# Patient Record
Sex: MALE | Race: WHITE | NOT HISPANIC OR LATINO | Employment: OTHER | URBAN - NONMETROPOLITAN AREA
[De-identification: names, ages, dates, MRNs, and addresses within clinical notes are randomized per-mention and may not be internally consistent; named-entity substitution may affect disease eponyms.]

---

## 2017-04-18 ENCOUNTER — TRANSCRIBE ORDERS (OUTPATIENT)
Dept: ADMINISTRATIVE | Facility: HOSPITAL | Age: 57
End: 2017-04-18

## 2017-04-18 DIAGNOSIS — R01.1 MURMUR, HEART: Primary | ICD-10-CM

## 2017-04-18 DIAGNOSIS — M79.604 LEG PAIN, RIGHT: ICD-10-CM

## 2017-04-18 DIAGNOSIS — M79.605 LEG PAIN, LEFT: ICD-10-CM

## 2017-04-18 DIAGNOSIS — R60.9 EDEMA, UNSPECIFIED TYPE: ICD-10-CM

## 2017-04-19 ENCOUNTER — APPOINTMENT (OUTPATIENT)
Dept: ULTRASOUND IMAGING | Facility: HOSPITAL | Age: 57
End: 2017-04-19

## 2017-04-19 ENCOUNTER — APPOINTMENT (OUTPATIENT)
Dept: CARDIOLOGY | Facility: HOSPITAL | Age: 57
End: 2017-04-19

## 2017-04-24 ENCOUNTER — HOSPITAL ENCOUNTER (OUTPATIENT)
Dept: GENERAL RADIOLOGY | Facility: HOSPITAL | Age: 57
Discharge: HOME | End: 2017-04-24

## 2017-04-24 ENCOUNTER — HOSPITAL ENCOUNTER (OUTPATIENT)
Dept: ULTRASOUND IMAGING | Facility: HOSPITAL | Age: 57
Discharge: HOME | End: 2017-04-24

## 2017-04-24 ENCOUNTER — TRANSCRIBE ORDERS (OUTPATIENT)
Dept: ADMINISTRATIVE | Facility: HOSPITAL | Age: 57
End: 2017-04-24

## 2017-04-24 ENCOUNTER — HOSPITAL ENCOUNTER (OUTPATIENT)
Dept: CARDIOLOGY | Facility: HOSPITAL | Age: 57
Discharge: HOME | End: 2017-04-24
Admitting: NURSE PRACTITIONER

## 2017-04-24 VITALS — HEIGHT: 65 IN | BODY MASS INDEX: 30.82 KG/M2 | WEIGHT: 185 LBS

## 2017-04-24 DIAGNOSIS — R60.9 EDEMA, UNSPECIFIED TYPE: ICD-10-CM

## 2017-04-24 DIAGNOSIS — R01.1 CARDIAC MURMUR: ICD-10-CM

## 2017-04-24 DIAGNOSIS — M79.605 LEG PAIN, LEFT: ICD-10-CM

## 2017-04-24 DIAGNOSIS — R60.9 EDEMA, UNSPECIFIED TYPE: Primary | ICD-10-CM

## 2017-04-24 DIAGNOSIS — R01.1 MURMUR, HEART: ICD-10-CM

## 2017-04-24 DIAGNOSIS — M79.604 LEG PAIN, RIGHT: ICD-10-CM

## 2017-04-24 LAB
BH CV ECHO MEAS - AO MAX PG (FULL): 6.3 MMHG
BH CV ECHO MEAS - AO MAX PG: 11.3 MMHG
BH CV ECHO MEAS - AO MEAN PG (FULL): 5 MMHG
BH CV ECHO MEAS - AO MEAN PG: 8 MMHG
BH CV ECHO MEAS - AO ROOT AREA (BSA CORRECTED): 1.7
BH CV ECHO MEAS - AO ROOT AREA: 8 CM^2
BH CV ECHO MEAS - AO ROOT DIAM: 3.2 CM
BH CV ECHO MEAS - AO V2 MAX: 168 CM/SEC
BH CV ECHO MEAS - AO V2 MEAN: 130 CM/SEC
BH CV ECHO MEAS - AO V2 VTI: 43.3 CM
BH CV ECHO MEAS - AVA(I,A): 2.6 CM^2
BH CV ECHO MEAS - AVA(I,D): 2.6 CM^2
BH CV ECHO MEAS - AVA(V,A): 2.8 CM^2
BH CV ECHO MEAS - AVA(V,D): 2.8 CM^2
BH CV ECHO MEAS - BSA(HAYCOCK): 2 M^2
BH CV ECHO MEAS - BSA: 1.9 M^2
BH CV ECHO MEAS - BZI_BMI: 30.8 KILOGRAMS/M^2
BH CV ECHO MEAS - BZI_METRIC_HEIGHT: 165.1 CM
BH CV ECHO MEAS - BZI_METRIC_WEIGHT: 83.9 KG
BH CV ECHO MEAS - CONTRAST EF 4CH: 54.1 ML/M^2
BH CV ECHO MEAS - EDV(CUBED): 59.3 ML
BH CV ECHO MEAS - EDV(MOD-SP4): 67.9 ML
BH CV ECHO MEAS - EDV(TEICH): 65.9 ML
BH CV ECHO MEAS - EF(CUBED): 59.7 %
BH CV ECHO MEAS - EF(TEICH): 52 %
BH CV ECHO MEAS - ESV(CUBED): 23.9 ML
BH CV ECHO MEAS - ESV(MOD-SP4): 31.2 ML
BH CV ECHO MEAS - ESV(TEICH): 31.7 ML
BH CV ECHO MEAS - FS: 26.2 %
BH CV ECHO MEAS - IVS/LVPW: 1.1
BH CV ECHO MEAS - IVSD: 1.3 CM
BH CV ECHO MEAS - LA DIMENSION: 4.3 CM
BH CV ECHO MEAS - LA/AO: 1.3
BH CV ECHO MEAS - LV DIASTOLIC VOL/BSA (35-75): 35.5 ML/M^2
BH CV ECHO MEAS - LV MASS(C)D: 174.5 GRAMS
BH CV ECHO MEAS - LV MASS(C)DI: 91.2 GRAMS/M^2
BH CV ECHO MEAS - LV MAX PG: 5 MMHG
BH CV ECHO MEAS - LV MEAN PG: 3 MMHG
BH CV ECHO MEAS - LV SYSTOLIC VOL/BSA (12-30): 16.3 ML/M^2
BH CV ECHO MEAS - LV V1 MAX: 112 CM/SEC
BH CV ECHO MEAS - LV V1 MEAN: 79.4 CM/SEC
BH CV ECHO MEAS - LV V1 VTI: 27 CM
BH CV ECHO MEAS - LVIDD: 3.9 CM
BH CV ECHO MEAS - LVIDS: 2.9 CM
BH CV ECHO MEAS - LVLD AP4: 7.8 CM
BH CV ECHO MEAS - LVLS AP4: 5.9 CM
BH CV ECHO MEAS - LVOT AREA (M): 4.2 CM^2
BH CV ECHO MEAS - LVOT AREA: 4.2 CM^2
BH CV ECHO MEAS - LVOT DIAM: 2.3 CM
BH CV ECHO MEAS - LVPWD: 1.2 CM
BH CV ECHO MEAS - MV A MAX VEL: 89.2 CM/SEC
BH CV ECHO MEAS - MV DEC TIME: 0.3 SEC
BH CV ECHO MEAS - MV E MAX VEL: 92.2 CM/SEC
BH CV ECHO MEAS - MV E/A: 1
BH CV ECHO MEAS - RAP SYSTOLE: 10 MMHG
BH CV ECHO MEAS - RVSP: 36 MMHG
BH CV ECHO MEAS - SI(AO): 182 ML/M^2
BH CV ECHO MEAS - SI(CUBED): 18.5 ML/M^2
BH CV ECHO MEAS - SI(LVOT): 58.6 ML/M^2
BH CV ECHO MEAS - SI(MOD-SP4): 19.2 ML/M^2
BH CV ECHO MEAS - SI(TEICH): 17.9 ML/M^2
BH CV ECHO MEAS - SV(AO): 348.2 ML
BH CV ECHO MEAS - SV(CUBED): 35.4 ML
BH CV ECHO MEAS - SV(LVOT): 112.2 ML
BH CV ECHO MEAS - SV(MOD-SP4): 36.7 ML
BH CV ECHO MEAS - SV(TEICH): 34.2 ML
BH CV ECHO MEAS - TR MAX VEL: 255 CM/SEC
E/E' RATIO: 13.9
LEFT ATRIUM VOLUME INDEX: 19.7 ML/M2
LEFT ATRIUM VOLUME: 37.7 CM3

## 2017-04-24 PROCEDURE — 93306 TTE W/DOPPLER COMPLETE: CPT | Performed by: INTERNAL MEDICINE

## 2017-04-24 PROCEDURE — 93306 TTE W/DOPPLER COMPLETE: CPT

## 2017-04-24 PROCEDURE — 71020 HC CHEST PA AND LATERAL: CPT

## 2017-04-24 PROCEDURE — 93970 EXTREMITY STUDY: CPT | Performed by: SURGERY

## 2017-04-24 PROCEDURE — 93970 EXTREMITY STUDY: CPT

## 2017-06-12 ENCOUNTER — TRANSCRIBE ORDERS (OUTPATIENT)
Dept: ADMINISTRATIVE | Facility: HOSPITAL | Age: 57
End: 2017-06-12

## 2017-06-12 ENCOUNTER — HOSPITAL ENCOUNTER (OUTPATIENT)
Dept: GENERAL RADIOLOGY | Facility: HOSPITAL | Age: 57
Discharge: HOME | End: 2017-06-12
Attending: INTERNAL MEDICINE | Admitting: INTERNAL MEDICINE

## 2017-06-12 DIAGNOSIS — M25.511 RIGHT SHOULDER PAIN, UNSPECIFIED CHRONICITY: Primary | ICD-10-CM

## 2017-06-12 PROCEDURE — 73030 X-RAY EXAM OF SHOULDER: CPT

## 2017-06-20 ENCOUNTER — TRANSCRIBE ORDERS (OUTPATIENT)
Dept: ADMINISTRATIVE | Facility: HOSPITAL | Age: 57
End: 2017-06-20
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
## 2017-06-20 DIAGNOSIS — M25.511 RIGHT SHOULDER PAIN, UNSPECIFIED CHRONICITY: ICD-10-CM

## 2017-06-23 ENCOUNTER — HOSPITAL ENCOUNTER (OUTPATIENT)
Dept: MRI IMAGING | Facility: HOSPITAL | Age: 57
Discharge: HOME | End: 2017-06-23
Admitting: NURSE PRACTITIONER

## 2017-06-23 DIAGNOSIS — M25.511 RIGHT SHOULDER PAIN, UNSPECIFIED CHRONICITY: ICD-10-CM
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
## 2017-06-23 PROCEDURE — 73221 MRI JOINT UPR EXTREM W/O DYE: CPT

## 2017-07-24 ENCOUNTER — TRANSCRIBE ORDERS (OUTPATIENT)
Dept: ADMINISTRATIVE | Facility: HOSPITAL | Age: 57
End: 2017-07-24

## 2017-07-24 DIAGNOSIS — R53.83 OTHER FATIGUE: ICD-10-CM

## 2017-07-24 DIAGNOSIS — R11.0 NAUSEA: Primary | ICD-10-CM

## 2017-07-26 ENCOUNTER — TRANSCRIBE ORDERS (OUTPATIENT)
Dept: PHYSICAL THERAPY | Facility: HOSPITAL | Age: 57
End: 2017-07-26

## 2017-07-26 DIAGNOSIS — M25.511 RIGHT SHOULDER PAIN, UNSPECIFIED CHRONICITY: Primary | ICD-10-CM

## 2017-08-01 ENCOUNTER — HOSPITAL ENCOUNTER (OUTPATIENT)
Dept: PHYSICAL THERAPY | Facility: HOSPITAL | Age: 57
Setting detail: THERAPIES SERIES
Discharge: HOME | End: 2017-08-01

## 2017-08-01 DIAGNOSIS — M25.511 RIGHT SHOULDER PAIN, UNSPECIFIED CHRONICITY: ICD-10-CM

## 2017-08-01 DIAGNOSIS — M75.121 COMPLETE TEAR OF RIGHT ROTATOR CUFF: Primary | ICD-10-CM

## 2017-08-01 PROCEDURE — 97110 THERAPEUTIC EXERCISES: CPT

## 2017-08-01 PROCEDURE — G8985 CARRY GOAL STATUS: HCPCS

## 2017-08-01 PROCEDURE — G8984 CARRY CURRENT STATUS: HCPCS

## 2017-08-01 PROCEDURE — 97161 PT EVAL LOW COMPLEX 20 MIN: CPT

## 2017-08-02 NOTE — THERAPY EVALUATION
"    Outpatient Physical Therapy Ortho Initial Evaluation  [facility]     Patient Name: Vitaly Pantoja  : 1960  MRN: [id number]  Today's Date: 2017      Visit Date: 2017    There is no problem list on file for this patient.       Past Medical History:   Diagnosis Date   • Back pain     current back pain per pt   • Chronic rheumatic arthritis    • Diabetes mellitus    • Hip dysplasia, congenital         Past Surgical History:   Procedure Laterality Date   • APPENDECTOMY     • JOINT REPLACEMENT Bilateral     TKA   • TONSILLECTOMY         Visit Dx:     ICD-10-CM ICD-9-CM   1. Complete tear of right rotator cuff M75.121 727.61   2. Right shoulder pain, unspecified chronicity M25.511 719.41             Patient History       17 1322          History    Chief Complaint Pain  -TC      Type of Pain Shoulder pain  -TC      Date Current Problem(s) Began 17  -TC      Brief Description of Current Complaint Pt reported that he fell onto his right shoulder  the . He was walking down the stairs, his right foot slipped and he went down onto his right shoulder which was by his side. Last Tuesday he fell again on his shoulder while inside onto carpet but had no increase in pain. He denies any numbness or tingling but if he moves \"wrong\" there will be shooting pain down the right arm into his thumb.     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 medication  -TC      What clinical tests have you had for this problem? X-ray;MRI  -TC      Results of Clinical Tests Xray R shoulder negative acute fracture; MRI; Full-width full-thickness retracted tears of the supraspinatus and infraspinatous tendons with muscle edema signal, AC joint arthropathy with lateral downsloping acromial process  -TC      Pain     Pain Location Shoulder  -TC      Pain " at Present 3  -TC      Pain at Best 0  -TC      Pain at Worst 10  -TC      Pain Frequency Intermittent  -TC      Pain Description Aching;Discomfort;Sharp;Shooting  -TC      What Performance Factors Make the Current Problem(s) WORSE? anything overhead using his arm, lifting anything heavy especially things like laundry or playing his guitars    -TC      What Performance Factors Make the Current Problem(s) BETTER? resting, medication for pain  -TC      Is your sleep disturbed? --   sometimes if rolls onto right side   -TC      Difficulties with ADL's? cleaning, cooking, laundry, lifting. donning/doffing shirt, lifting, carrying, yard work, difficulty caring for his grandmother  -TC      Fall Risk Assessment    Any falls in the past year: Yes  -TC      Number of falls reported in the last 12 months 2;   -TC      Factors that contributed to the fall: Slippery surface;Lost balance  -TC      Services    Prior Rehab/Home Health Experiences Yes  -TC      When was the prior experience with Rehab/Home Health long time ago as a child for hip dysplagia  -TC      Daily Activities    Primary Language English  -TC      How does patient learn best? Listening;Reading;Demonstration;Pictures/Video  -TC      Teaching needs identified Home Exercise Program;Management of Condition;Falls Prevention  -TC      Patient is concerned about/has problems with Difficulty with self care (i.e. bathing, dressing, toileting:;Performing home management (household chores, shopping, care of dependents);Repetitive movements of the hand, arm, shoulder  -TC      Does patient have problems with the following? Depression;Anxiety;Panic Attack  -TC      Barriers to learning --   pt has some slight comprehensive difficulties   -TC      Action taken for identified issues Heavily educated using multiple learning models such as vision, reading, demo, pictures and description  -TC      Pt Participated in POC and Goals Yes  -TC        User Key  (r) = Recorded By,  (t) = Taken By, (c) = Cosigned By    Initials Name Provider Type    TC Rivka Lewis, PT DPT Physical Therapist                PT Ortho       08/01/17 1300    Posture/Observations    Alignment Options Forward head;Cervical lordosis;Thoracic kyphosis;Rounded shoulders  -TC    Forward Head Moderate  -TC    Cervical Lordosis Moderate  -TC    Thoracic Kyphosis Moderate  -TC    Rounded Shoulders Moderate  -TC    Posture/Observations Comments Examining his shoulder, therapist noted hollowing/diveting at the SS insertion and IS insertion  -TC    Quarter Clearing    Quarter Clearing Upper Quarter Clearing  -TC    DTR- Upper Quarter Clearing    Biceps (C5/6) Bilateral:;2- Normal response  -TC    Brachioradialis (C6) Bilateral:;2- Normal response  -TC    Triceps (C7) Bilateral:;2- Normal response  -TC    Sensory Screen for Light Touch- Upper Quarter Clearing    C4 (posterior shoulder) Bilateral:;Intact  -TC    C5 (lateral upper arm) Bilateral:;Intact  -TC    C6 (tip of thumb) Right:;Diminished;Left:;Intact  -TC    C7 (tip of 3rd finger) Bilateral:;Intact  -TC    C8 (tip of 5th finger) Bilateral:;Intact  -TC    T1 (medial lower arm) Bilateral:;Intact  -TC    Myotomal Screen- Upper Quarter Clearing    Shoulder flexion (C5) Right:;3- (Fair -);3 (Fair);Left:;4+ (Good +)   partially limited by pain on the right   -TC    Elbow flexion/wrist extension (C6) Bilateral:;4+ (Good +)  -TC    Elbow extension/wrist flexion (C7) Bilateral:;4- (Good -)  -TC    Finger flexion/ (C8) Right:;4 (Good);Left:;4+ (Good +)  -TC    Cervical/Shoulder ROM Screen    Cervical flexion Normal  -TC    Cervical extension Normal  -TC    Cervical lateral flexion Normal  -TC    Cervical rotation Normal  -TC    Cervical quadrant (Spurling's) Normal  -TC    Special Tests/Palpation    Special Tests/Palpation Cervical/Thoracic;Shoulder  -TC    Cervical Palpation    Cervical Palpation- Location? AC joint;Scalenes;Levator scapula;Upper traps;Middle  traps;Lower traps  -TC    AC Joint Right:;Tender  -TC    Scalenes Bilateral:;Guarded/taut  -TC    Levator Scapula Right:;Tender;Guarded/taut  -TC    Upper Traps Right:;Tender;Guarded/taut  -TC    Middle Traps Bilateral:;Atrophied  -TC    Lower Traps Bilateral:;Atrophied  -TC    Cervical Accessory Motions    Cervical Accessory Motions Tested? Yes  -TC    Thoracic Accessory Motions    Thoracic Accessory Motions Tested? Yes  -TC    Pa glide- Upper thoracic Hypomobile  -TC    Pa glide- Middle thoracic Hypomobile  -TC    Pa glide- Lower thoracic Hypomobile  -TC    Cervical/Thoracic Special Tests    Spurlings (Foraminal Compression) Negative  -TC    Cervical Compression (Forarminal Compression vs. Facet Pain) Negative  -TC    Cervical Distraction (Foraminal Compression vs. Facet Pain) Negative  -TC    Bakody/Shoulder Abduction Sign (Cervical Radiculopathy) Negative  -TC    Shoulder Girdle Palpation    Subacromial Space Right:;Tender   diveting noted  -TC    Supraspinatus Insertion Right:;Tender   diveting noted  -TC    AC Joint Right:;Tender;Warm  -TC    Deltoid Right:;Guarded/taut;Atrophied  -TC    Infraspinatus Right:;Tender   diveting noted over insertion, retracted IS mm palpated   -TC    Shoulder Girdle Palpation? Yes  -TC    Shoulder Girdle Accessory Motions    Shoulder Girdle Accessory Motions Tested? Yes  -TC    Posterior glide of humerus Right:;Hypomobile  -TC    Inferior glide of humerus Right:;Hypomobile   only slightly hypomobile  -TC    A-P glide of AC joint Right:;Hypomobile   painful   -TC    Cephalocaudal glide of AC joint Right:;Hypomobile   painful   -TC    Shoulder Impingement/Rotator Cuff Special Tests    Drop Arm Test (Full Thickness RC Lesion) Right:;Positive  -TC    Lift-Off Test (Subscapularis Lesion) Right:;Positive  -TC    Shoulder Laxity/Instability Special Tests    Load and Shift Test Right:;Positive   anteriorly   -TC    Sulcus Sign, 0 Degrees Right:;Positive  -TC    Horizontal Adduction Test  (AC Joint Pain) Right:;Positive  -TC    Scapular Special Tests    Scapular Assistance Test (Scapular Dyskinesia) Right:;Positive  -TC    Scapular Retraction Test (Scapular Dyskinesia) Right:;Positive  -TC    ROM (Range of Motion)    General ROM upper extremity range of motion deficits identified  -TC    Right Shoulder    Flexion AROM Deficit 88 degrees w/o significant compensations; otherwise able to achieve 160 deg   w/o significant compensations  -TC    Flexion PROM Deficit 165 deg  -TC    ABduction AROM Deficit 45 degrees w/o significant compensations otherwise able to get to 160  -TC    ABduction PROM Deficit 160 deg   -TC    External Rotation AROM Deficit by side; 23 deg   -TC    External Rotation PROM Deficit by side 33 deg   -TC    Left Scapula    ABduction AROM hypermobility present  -TC    ABduction PROM hypermobility present  -TC    ADduction AROM restricted  -TC    ADduction PROM restricted  -TC    Right Scapula    ABduction AROM hypermobility present  -TC    ABduction PROM hypermobility present  -TC    ADduction AROM restricted  -TC    ADduction PROM restricted  -TC    General UE Assessment    ROM LUE ROM was WNL;shoulder, right: UE ROM deficit;scapula, right: UE ROM deficit;scapula, left: UE ROM deficit  -    MMT (Manual Muscle Testing)    General MMT Assessment upper extremity strength deficits identified  -TC    General MMT Assessment Detail  on R 35lbs,  on L 40lbs   -    Upper Extremity    Upper Ext Manual Muscle Testing right shoulder strength deficit;left scapular strength deficit;right scapular strength deficit  -    Flexibility    Flexibility Tested? Upper Extremity  -TC    Upper Extremity Flexibility    Upper Trapezius Right:;Mildly limited  -TC    Levator Scapula Right:;Mildly limited  -TC    Pect Minor Bilateral:;Moderately limited  -TC    Pect Major Moderately limited;Bilateral:  -TC    Pathomechanics    Upper Extremity Pathomechanics Excessive scapular elevation;Winging of  scapula with elevation;Winging of scapula with return to neutral;Limited thoracic extension  -TC    Pathomechanics Comments When attempting R shoulder abduction he demonstrates scapular elevation, superior GHJ glide and excessive GHJ IR below 90 and uses momentum strategy to raise shoulder the remaining range overhead. When he attempts shoulder flexion he demonstrates initiation of motion by increased scapular protraction and anterior placement ofthe GHJ along with excessive scapular elevation until about 90 degrees. To perform motion overhead he then demonstrates same momentum strategy and leans backwards to raise overhead.   -TC      User Key  (r) = Recorded By, (t) = Taken By, (c) = Cosigned By    Initials Name Provider Type    TC Rivka Lewis, PT DPT Physical Therapist                            Therapy Education       08/01/17 1322          Therapy Education    Given HEP;Symptoms/condition management;Pain management;Posture/body mechanics  -TC      Program New   R shoulder passive ER/IR, flexion and scapular Ws  -TC      How Provided Verbal;Demonstration;Written  -TC      Provided to Patient  -TC      Level of Understanding Teach back education performed;Verbalized;Demonstrated  -TC        User Key  (r) = Recorded By, (t) = Taken By, (c) = Cosigned By    Initials Name Provider Type    TC Rivka Lewis, PT DPT Physical Therapist                PT OP Goals       08/01/17 1322       PT Short Term Goals    STG Date to Achieve 08/29/17  -TC     STG 1 Pt will report 50% decrease in R shoulder pain with ROM activity.   -TC     STG 1 Progress New  -TC     STG 2 Pt will demonstrate 25% improvement in upright posture especially regarding thoracic extension.   -TC     STG 2 Progress New  -     Long Term Goals    LTG Date to Achieve 09/26/17  -TC     LTG 1 Pt will demonstrate improved thoracic extension by 50%.   -TC     LTG 1 Progress New  -TC     LTG 2 Pt will demonstrate full PROM of the right shoulder  painfree before discharge.   -TC     LTG 2 Progress New  -TC     LTG 3 Pt will demonstrate AROM equal to that of the uninvolved side with pain no greater than 2/10.   -TC     LTG 3 Progress New  -TC     LTG 4 Pt will demonstrate improved  strength to at least 55lb on the right before d/c.   -TC     LTG 4 Progress New  -TC     LTG 5 Pt will show improved gross shoulder strength to 4/5 before d/c.   -TC     LTG 5 Progress New  -TC     LTG 6 Pt will be able to lift/carry >40lbs with pain <3/10 in order to demonstrate gains in functional ability.   -TC     LTG 6 Progress New  -TC     LTG 7 Pt will demonstrate improved scapular strength to 4-/5 in order to improve functional ability of the shoulder complex.   -TC     LTG 7 Progress New  -TC     Time Calculation    PT Goal Re-Cert Due Date 09/01/17  -TC       User Key  (r) = Recorded By, (t) = Taken By, (c) = Cosigned By    Initials Name Provider Type    TC Rivka Lewis, PT DPT Physical Therapist                PT Assessment/Plan       08/01/17 1322       PT Assessment    Functional Limitations Limitations in functional capacity and performance;Performance in self-care ADL;Limitations in community activities;Limitation in home management  -TC     Impairments Endurance;Impaired muscle power;Joint mobility;Muscle strength;Pain;Posture  -TC     Assessment Comments Patient presents to us with 2 month history of right shoulder pain s/p a fall from stairs onto his shoulder. He had an Xray and MRI at the time and although an acute fracture was ruled out, they discovered full thickness tears of the infraspinatous and supraspinatous muscles of the rotator cuff. This patient wishes to participate in physical therapy to regain strength and function of the right shoulder so that he may avoid surgery at this time. Upon assessment he presents with slight right GHJ hypomobility, scapular dyskinesia, gross shoulder and scapular weakness and proprioception and also signs of an  AC sprain. These impairments limit his PROM and AROM also leading to poor pathomechanics and compensatory strategies during movement exacerbating his hypomobility and pain. Despite his full thickness tears we will work to normalize adequate force coupling relationships of the shoulder and scapula beginning with ROM and proprioception activities and progressing towards functional strengthening so that we may restore his function avoiding surgical intervention. Thank you for this referral, we look forward to working with him.   -TC     Please refer to paper survey for additional self-reported information Yes  -TC     Rehab Potential Good  -TC     Patient/caregiver participated in establishment of treatment plan and goals Yes  -TC     Patient would benefit from skilled therapy intervention Yes  -TC     PT Plan    PT Frequency 2x/week  -TC     Predicted Duration of Therapy Intervention (days/wks) 8 weeks   -TC     Planned CPT's? PT EVAL LOW COMPLEXITY: 14583;PT THER PROC EA 15 MIN: 79064;PT MANUAL THERAPY EA 15 MIN: 11124;PT NEUROMUSC RE-EDUCATION EA 15 MIN: 03845;PT ELECTRICAL STIM UNATTEND: ;PT ELECTRICAL STIM ATTD EA 15 MIN: 40885  -TC     Physical Therapy Interventions (Optional Details) home exercise program;joint mobilization;manual therapy techniques;modalities;gross motor skills;gait training;patient/family education;postural re-education;ROM (Range of Motion);strengthening;taping  -TC     PT Plan Comments Restoring his shoulder, scapular, and thoracic mobility and ROM will be our focus initially. We will then progress him towards more static and dynamic proprioception training to begin to restore proper force coupling with our ending focus more on functional strengthening in order to progress him towards his overall functional goal of caring for his 97 year old grand mother.  -TC       User Key  (r) = Recorded By, (t) = Taken By, (c) = Cosigned By    Initials Name Provider Type    TC Rivka Lewis, PT  DPT Physical Therapist                  Exercises       08/01/17 1322          Exercise 1    Exercise Name 1 passive R shoulder ER  -TC      Sets 1 2  -TC      Reps 1 15  -TC      Exercise 2    Exercise Name 2 passive R shoulder flexion  -TC      Sets 2 2  -TC      Reps 2 15  -TC      Exercise 3    Exercise Name 3 scapular Ws in sitting  -TC      Sets 3 2  -TC      Reps 3 10  -TC        User Key  (r) = Recorded By, (t) = Taken By, (c) = Cosigned By    Initials Name Provider Type    TC Rivka Lewis, ANANYA CARCAMOT Physical Therapist                              Outcome Measures       08/01/17 1322          Quick DASH    Open a tight or new jar. 2  -TC      Do heavy household chores (e.g., wash walls, wash floors) 4  -TC      Carry a shopping bag or briefcase 4  -TC      Wash your back 5  -TC      Use a knife to cut food 1  -TC      Recreational activities in which you take some force or impact through your arm, should or hand (e.g. golf, hammering, tennis, etc.) 3  -TC      During the past week, to what extent has your arm, shoulder, or hand problem interfered with your normal social activites with family, friends, neighbors or groups? 4  -TC      During the past week, were you limited in your work or other regular daily activities as a result of your arm, shoulder or hand problem? 3  -TC      Arm, Shoulder, or hand pain 4  -TC      Tingling (pins and needles) in your arm, shoulder, or hand 2  -TC      During the past week, how much difficulty have you had sleeping because of the pain in your arm, shoulder or hand? 2  -TC      Number of Questions Answered 11  -TC      Quick DASH Score 52.27  -TC      Functional Assessment    Outcome Measure Options Quick DASH  -TC        User Key  (r) = Recorded By, (t) = Taken By, (c) = Cosigned By    Initials Name Provider Type    TC Rivka Lewis PT DPT Physical Therapist            Time Calculation:   Start Time: 1322  Stop Time: 1422  Time Calculation (min): 60 min      Therapy Charges for Today     Code Description Service Date Service Provider Modifiers Qty    49501940739 HC PT EVAL LOW COMPLEXITY 3 8/1/2017 Rivka Lewis, PT DPT GP 1    15334609190 HC PT THER PROC EA 15 MIN 8/1/2017 Rivka Lewis, PT DPT GP 1    57383541748 HC PT CARRY MOV HAND OBJ CURRENT 8/1/2017 Rivak Lewis, PT DPT GP, CK 1    06124080105 HC PT CARRY MOV HAND OBJ PROJECTED 8/1/2017 Rivka Lewis, PT DPT GP, CI 1          PT G-Codes  Outcome Measure Options: Quick DASH  Score: 52.27  Functional Limitation: Carrying, moving and handling objects  Carrying, Moving and Handling Objects Current Status (): At least 40 percent but less than 60 percent impaired, limited or restricted  Carrying, Moving and Handling Objects Goal Status (): At least 1 percent but less than 20 percent impaired, limited or restricted         Rivka Lewis PT DPT  8/2/2017

## 2017-08-03 ENCOUNTER — HOSPITAL ENCOUNTER (OUTPATIENT)
Dept: PHYSICAL THERAPY | Facility: HOSPITAL | Age: 57
Setting detail: THERAPIES SERIES
Discharge: HOME | End: 2017-08-03

## 2017-08-03 DIAGNOSIS — M75.121 COMPLETE TEAR OF RIGHT ROTATOR CUFF: ICD-10-CM

## 2017-08-03 DIAGNOSIS — M25.511 RIGHT SHOULDER PAIN, UNSPECIFIED CHRONICITY: Primary | ICD-10-CM

## 2017-08-03 PROCEDURE — 97140 MANUAL THERAPY 1/> REGIONS: CPT | Performed by: PHYSICAL THERAPIST

## 2017-08-03 PROCEDURE — 97110 THERAPEUTIC EXERCISES: CPT | Performed by: PHYSICAL THERAPIST

## 2017-08-03 NOTE — THERAPY TREATMENT NOTE
Outpatient Physical Therapy Ortho Treatment Note  [facility]     Patient Name: Vitaly Pantoja  : 1960  MRN: [id number]  Today's Date: 8/3/2017      Visit Date: 2017    Visit Dx:    ICD-10-CM ICD-9-CM   1. Right shoulder pain, unspecified chronicity M25.511 719.41   2. Complete tear of right rotator cuff M75.121 727.61       There is no problem list on file for this patient.       Past Medical History:   Diagnosis Date   • Back pain     current back pain per pt   • Chronic rheumatic arthritis    • Diabetes mellitus    • Hip dysplasia, congenital         Past Surgical History:   Procedure Laterality Date   • APPENDECTOMY     • JOINT REPLACEMENT Bilateral     TKA   • TONSILLECTOMY               PT Ortho       17 1300    Posture/Observations    Alignment Options Forward head;Cervical lordosis;Thoracic kyphosis;Rounded shoulders  -TC    Forward Head Moderate  -TC    Cervical Lordosis Moderate  -TC    Thoracic Kyphosis Moderate  -TC    Rounded Shoulders Moderate  -TC    Posture/Observations Comments Examining his shoulder, therapist noted hollowing/diveting at the SS insertion and IS insertion  -TC    Quarter Clearing    Quarter Clearing Upper Quarter Clearing  -TC    DTR- Upper Quarter Clearing    Biceps (C5/6) Bilateral:;2- Normal response  -TC    Brachioradialis (C6) Bilateral:;2- Normal response  -TC    Triceps (C7) Bilateral:;2- Normal response  -TC    Sensory Screen for Light Touch- Upper Quarter Clearing    C4 (posterior shoulder) Bilateral:;Intact  -TC    C5 (lateral upper arm) Bilateral:;Intact  -TC    C6 (tip of thumb) Right:;Diminished;Left:;Intact  -TC    C7 (tip of 3rd finger) Bilateral:;Intact  -TC    C8 (tip of 5th finger) Bilateral:;Intact  -TC    T1 (medial lower arm) Bilateral:;Intact  -TC    Myotomal Screen- Upper Quarter Clearing    Shoulder flexion (C5) Right:;3- (Fair -);3 (Fair);Left:;4+ (Good +)   partially limited by pain on the right   -TC    Elbow flexion/wrist  extension (C6) Bilateral:;4+ (Good +)  -TC    Elbow extension/wrist flexion (C7) Bilateral:;4- (Good -)  -TC    Finger flexion/ (C8) Right:;4 (Good);Left:;4+ (Good +)  -TC    Cervical/Shoulder ROM Screen    Cervical flexion Normal  -TC    Cervical extension Normal  -TC    Cervical lateral flexion Normal  -TC    Cervical rotation Normal  -TC    Cervical quadrant (Spurling's) Normal  -TC    Special Tests/Palpation    Special Tests/Palpation Cervical/Thoracic;Shoulder  -TC    Cervical Palpation    Cervical Palpation- Location? AC joint;Scalenes;Levator scapula;Upper traps;Middle traps;Lower traps  -TC    AC Joint Right:;Tender  -TC    Scalenes Bilateral:;Guarded/taut  -TC    Levator Scapula Right:;Tender;Guarded/taut  -TC    Upper Traps Right:;Tender;Guarded/taut  -TC    Middle Traps Bilateral:;Atrophied  -TC    Lower Traps Bilateral:;Atrophied  -TC    Cervical Accessory Motions    Cervical Accessory Motions Tested? Yes  -TC    Thoracic Accessory Motions    Thoracic Accessory Motions Tested? Yes  -TC    Pa glide- Upper thoracic Hypomobile  -TC    Pa glide- Middle thoracic Hypomobile  -TC    Pa glide- Lower thoracic Hypomobile  -TC    Cervical/Thoracic Special Tests    Spurlings (Foraminal Compression) Negative  -TC    Cervical Compression (Forarminal Compression vs. Facet Pain) Negative  -TC    Cervical Distraction (Foraminal Compression vs. Facet Pain) Negative  -TC    Bakody/Shoulder Abduction Sign (Cervical Radiculopathy) Negative  -TC    Shoulder Girdle Palpation    Subacromial Space Right:;Tender   diveting noted  -TC    Supraspinatus Insertion Right:;Tender   diveting noted  -TC    AC Joint Right:;Tender;Warm  -TC    Deltoid Right:;Guarded/taut;Atrophied  -TC    Infraspinatus Right:;Tender   diveting noted over insertion, retracted IS mm palpated   -TC    Shoulder Girdle Palpation? Yes  -TC    Shoulder Girdle Accessory Motions    Shoulder Girdle Accessory Motions Tested? Yes  -TC    Posterior glide of humerus  Right:;Hypomobile  -TC    Inferior glide of humerus Right:;Hypomobile   only slightly hypomobile  -TC    A-P glide of AC joint Right:;Hypomobile   painful   -TC    Cephalocaudal glide of AC joint Right:;Hypomobile   painful   -TC    Shoulder Impingement/Rotator Cuff Special Tests    Drop Arm Test (Full Thickness RC Lesion) Right:;Positive  -TC    Lift-Off Test (Subscapularis Lesion) Right:;Positive  -TC    Shoulder Laxity/Instability Special Tests    Load and Shift Test Right:;Positive   anteriorly   -TC    Sulcus Sign, 0 Degrees Right:;Positive  -TC    Horizontal Adduction Test (AC Joint Pain) Right:;Positive  -TC    Scapular Special Tests    Scapular Assistance Test (Scapular Dyskinesia) Right:;Positive  -TC    Scapular Retraction Test (Scapular Dyskinesia) Right:;Positive  -TC    ROM (Range of Motion)    General ROM upper extremity range of motion deficits identified  -TC    Right Shoulder    Flexion AROM Deficit 88 degrees w/o significant compensations; otherwise able to achieve 160 deg   w/o significant compensations  -TC    Flexion PROM Deficit 165 deg  -TC    ABduction AROM Deficit 45 degrees w/o significant compensations otherwise able to get to 160  -TC    ABduction PROM Deficit 160 deg   -TC    External Rotation AROM Deficit by side; 23 deg   -TC    External Rotation PROM Deficit by side 33 deg   -TC    Left Scapula    ABduction AROM hypermobility present  -TC    ABduction PROM hypermobility present  -TC    ADduction AROM restricted  -TC    ADduction PROM restricted  -TC    Right Scapula    ABduction AROM hypermobility present  -TC    ABduction PROM hypermobility present  -TC    ADduction AROM restricted  -TC    ADduction PROM restricted  -TC    General UE Assessment    ROM LUE ROM was WNL;shoulder, right: UE ROM deficit;scapula, right: UE ROM deficit;scapula, left: UE ROM deficit  -TC    MMT (Manual Muscle Testing)    General MMT Assessment upper extremity strength deficits identified  -TC    General MMT  Assessment Detail  on R 35lbs,  on L 40lbs   -TC    Upper Extremity    Upper Ext Manual Muscle Testing right shoulder strength deficit;left scapular strength deficit;right scapular strength deficit  -TC    Flexibility    Flexibility Tested? Upper Extremity  -TC    Upper Extremity Flexibility    Upper Trapezius Right:;Mildly limited  -TC    Levator Scapula Right:;Mildly limited  -TC    Pect Minor Bilateral:;Moderately limited  -TC    Pect Major Moderately limited;Bilateral:  -TC    Pathomechanics    Upper Extremity Pathomechanics Excessive scapular elevation;Winging of scapula with elevation;Winging of scapula with return to neutral;Limited thoracic extension  -TC    Pathomechanics Comments When attempting R shoulder abduction he demonstrates scapular elevation, superior GHJ glide and excessive GHJ IR below 90 and uses momentum strategy to raise shoulder the remaining range overhead. When he attempts shoulder flexion he demonstrates initiation of motion by increased scapular protraction and anterior placement ofthe GHJ along with excessive scapular elevation until about 90 degrees. To perform motion overhead he then demonstrates same momentum strategy and leans backwards to raise overhead.   -TC      User Key  (r) = Recorded By, (t) = Taken By, (c) = Cosigned By    Initials Name Provider Type    TC Rivka Lewis, PT DPT Physical Therapist                            PT Assessment/Plan       08/03/17 0930       PT Assessment    Assessment Comments No goals met, this was Vitaly's first visit since his initial evaluation. He is still limtied with shoulder ROM limtied all ADLs. Today I worked on his GH and thoracic spines to mobilize and improve mobility. He did not have an increase in pain.   -KR     PT Plan    PT Plan Comments Continue to work GH and thoracic mobilty. Progress with scapular strengthening, focusing on posture and improving scapular/GH mobility.   -KR       User Key  (r) = Recorded By, (t) =  Taken By, (c) = Cosigned By    Initials Name Provider Type    KARIME Carranza, PT DPT Physical Therapist                    Exercises       08/03/17 0930          Subjective Comments    Subjective Comments He reports doing ok after first visit. Reports compliance with his HEP.   -KR      Subjective Pain    Able to rate subjective pain? yes  -KR      Pre-Treatment Pain Level 8  -KR      Post-Treatment Pain Level 8  -KR      Subjective Pain Comment R shoulder and into hand.   -KR      Exercise 1    Exercise Name 1 wand shoulder flexion AAROM; pain free  -KR      Cueing 1 Verbal  -KR      Sets 1 2  -KR      Reps 1 10  -KR      Exercise 2    Exercise Name 2 wand shoulder ER AAROM; pain free  -KR      Cueing 2 Verbal;Tactile  -KR      Sets 2 2  -KR      Reps 2 15  -KR      Exercise 3    Exercise Name 3 attempted therapist assist prone scapular retractions, but pt unable   -KR      Cueing 3 Verbal;Tactile;Auditory  -KR      Exercise 4    Exercise Name 4 scapular Ws in sitting  -KR      Cueing 4 Verbal;Tactile   to prevent R shoulder elevation  -KR      Sets 4 2  -KR      Reps 4 15  -KR      Exercise 5    Exercise Name 5 Hooklying thoracic extension stretch over towel roll   -KR      Time (Minutes) 5 5  -KR      Exercise 6    Exercise Name 6 discussed continuation of HEP given on first visit; and POC/progression  -KR        User Key  (r) = Recorded By, (t) = Taken By, (c) = Cosigned By    Initials Name Provider Type    KARIME Carranza, PT DPT Physical Therapist                        Manual Rx (last 36 hours)      Manual Treatments       08/03/17 0930          Manual Rx 1    Manual Rx 1 Location hooklying R shoulder PROM flexion, abduction, ER pain free  -KR      Manual Rx 1 Duration 10  -KR      Manual Rx 2    Manual Rx 2 Location R GH mobilizations  -KR      Manual Rx 2 Type posterior/inferior  -KR      Manual Rx 2 Grade 1/2  -KR      Manual Rx 2 Duration 6  -KR      Manual Rx 3    Manual Rx 3 Location prone  thoracic   -KR      Manual Rx 3 Grade 2/3   no cavitations  -KR      Manual Rx 3 Duration 8  -KR        User Key  (r) = Recorded By, (t) = Taken By, (c) = Cosigned By    Initials Name Provider Type    KARIME Carranza, PT DPT Physical Therapist                PT OP Goals       08/03/17 0930       PT Short Term Goals    STG Date to Achieve 08/29/17  -KR     STG 1 Pt will report 50% decrease in R shoulder pain with ROM activity.   -KR     STG 1 Progress Ongoing  -KR     STG 1 Progress Comments 8/10 today  -KR     STG 2 Pt will demonstrate 25% improvement in upright posture especially regarding thoracic extension.   -KR     STG 2 Progress Ongoing  -KR     Long Term Goals    LTG Date to Achieve 09/26/17  -KR     LTG 1 Pt will demonstrate improved thoracic extension by 50%.   -KR     LTG 1 Progress Ongoing  -KR     LTG 2 Pt will demonstrate full PROM of the right shoulder painfree before discharge.   -KR     LTG 2 Progress Ongoing  -KR     LTG 3 Pt will demonstrate AROM equal to that of the uninvolved side with pain no greater than 2/10.   -KR     LTG 3 Progress Ongoing  -KR     LTG 4 Pt will demonstrate improved  strength to at least 55lb on the right before d/c.   -KR     LTG 4 Progress Ongoing  -KR     LTG 5 Pt will show improved gross shoulder strength to 4/5 before d/c.   -KR     LTG 5 Progress Ongoing  -KR     LTG 6 Pt will be able to lift/carry >40lbs with pain <3/10 in order to demonstrate gains in functional ability.   -KR     LTG 6 Progress Ongoing  -KR     LTG 7 Pt will demonstrate improved scapular strength to 4-/5 in order to improve functional ability of the shoulder complex.   -KR     LTG 7 Progress Ongoing  -KR     Time Calculation    PT Goal Re-Cert Due Date 09/01/17  -KR       User Key  (r) = Recorded By, (t) = Taken By, (c) = Cosigned By    Initials Name Provider Type    KARIME Carranza PT DPT Physical Therapist                Therapy Education       08/03/17 0930          Therapy  Education    Given HEP;Symptoms/condition management;Posture/body mechanics  -KR      Program Reinforced  -KR      How Provided Verbal;Demonstration  -KR      Provided to Patient  -KR      Level of Understanding Verbalized;Demonstrated  -KR        User Key  (r) = Recorded By, (t) = Taken By, (c) = Cosigned By    Initials Name Provider Type    KARIME Carranza, PT DPT Physical Therapist                Outcome Measures       08/01/17 1322          Quick DASH    Open a tight or new jar. 2  -TC      Do heavy household chores (e.g., wash walls, wash floors) 4  -TC      Carry a shopping bag or briefcase 4  -TC      Wash your back 5  -TC      Use a knife to cut food 1  -TC      Recreational activities in which you take some force or impact through your arm, should or hand (e.g. golf, hammering, tennis, etc.) 3  -TC      During the past week, to what extent has your arm, shoulder, or hand problem interfered with your normal social activites with family, friends, neighbors or groups? 4  -TC      During the past week, were you limited in your work or other regular daily activities as a result of your arm, shoulder or hand problem? 3  -TC      Arm, Shoulder, or hand pain 4  -TC      Tingling (pins and needles) in your arm, shoulder, or hand 2  -TC      During the past week, how much difficulty have you had sleeping because of the pain in your arm, shoulder or hand? 2  -TC      Number of Questions Answered 11  -TC      Quick DASH Score 52.27  -TC      Functional Assessment    Outcome Measure Options Quick DASH  -TC        User Key  (r) = Recorded By, (t) = Taken By, (c) = Cosigned By    Initials Name Provider Type    ADRIEN Lewis, PT DPT Physical Therapist            Time Calculation:   Start Time: 0930  Stop Time: 1022  Time Calculation (min): 52 min  Total Timed Code Minutes- PT: 50 minute(s)    Therapy Charges for Today     Code Description Service Date Service Provider Modifiers Qty    22372538416  PT MANUAL  THERAPY EA 15 MIN 8/3/2017 Macy Carranza, PT DPT GP 2    [id number]  PT THER PROC EA 15 MIN 8/3/2017 Macy Carranza, PT DPT GP 1                    Macy Carranza, PT DPT  8/3/2017

## 2017-08-08 ENCOUNTER — HOSPITAL ENCOUNTER (OUTPATIENT)
Dept: PHYSICAL THERAPY | Facility: HOSPITAL | Age: 57
Setting detail: THERAPIES SERIES
Discharge: HOME | End: 2017-08-08

## 2017-08-08 DIAGNOSIS — M25.511 RIGHT SHOULDER PAIN, UNSPECIFIED CHRONICITY: Primary | ICD-10-CM

## 2017-08-08 DIAGNOSIS — M75.121 COMPLETE TEAR OF RIGHT ROTATOR CUFF: ICD-10-CM

## 2017-08-08 PROCEDURE — 97140 MANUAL THERAPY 1/> REGIONS: CPT

## 2017-08-08 PROCEDURE — 97032 APPL MODALITY 1+ESTIM EA 15: CPT

## 2017-08-08 NOTE — THERAPY TREATMENT NOTE
Outpatient Physical Therapy Ortho Treatment Note  [facility]     Patient Name: Vitaly Pantoja  : 1960  MRN: [id number]  Today's Date: 2017      Visit Date: 2017    Visit Dx:    ICD-10-CM ICD-9-CM   1. Right shoulder pain, unspecified chronicity M25.511 719.41   2. Complete tear of right rotator cuff M75.121 727.61       There is no problem list on file for this patient.       Past Medical History:   Diagnosis Date   • Back pain     current back pain per pt   • Chronic rheumatic arthritis    • Diabetes mellitus    • Hip dysplasia, congenital         Past Surgical History:   Procedure Laterality Date   • APPENDECTOMY     • JOINT REPLACEMENT Bilateral     TKA   • TONSILLECTOMY                               PT Assessment/Plan       17 1447       PT Assessment    Assessment Comments He exhibts a pronounced forward head and shoulder posture with significant thoracic kyphosis that is very stiff. He had significant soft tissue restrictions in his posterior shoulder girdle which did reduce with intervention today. I advised him he may likely require surgical intervention but even if that does occur we will approach the POC as a prehab procedure in order to improve the outcome.  -EC     PT Plan    PT Plan Comments Continue to work on flexibility, mobilizations, and progress as his symptoms allow.  -EC       User Key  (r) = Recorded By, (t) = Taken By, (c) = Cosigned By    Initials Name Provider Type    PARKER Andre PTA Physical Therapy Assistant                Modalities       17 1300          ELECTRICAL STIMULATION    Attended/Unattended Attended  -EC      Stimulation Type --   Chronic Inflammation Mode Laser stim  -EC      Location/Electrode Placement/Other R posterior shoulder globally  -EC      Rx Minutes 10 mins  -EC        User Key  (r) = Recorded By, (t) = Taken By, (c) = Cosigned By    Initials Name Provider Type    PARKER Andre PTA Physical Therapy Assistant                 Exercises       08/08/17 1400 08/08/17 1300       Subjective Comments    Subjective Comments  He reports R shoulder pain. Pt did mention a 40 lb. weight loss over the last 4 to 6 months  -EC     Subjective Pain    Able to rate subjective pain?  yes  -EC     Pre-Treatment Pain Level  6  -EC     Post-Treatment Pain Level  6  -EC     Exercise 1    Exercise Name 1 passive R shoulder ER/IR stretches  -EC        User Key  (r) = Recorded By, (t) = Taken By, (c) = Cosigned By    Initials Name Provider Type    EC Steve Andre PTA Physical Therapy Assistant                        Manual Rx (last 36 hours)      Manual Treatments       08/08/17 1300          Manual Rx 1    Manual Rx 1 Location thoracic  -EC      Manual Rx 1 Type prone extension mobilizations  -EC      Manual Rx 1 Grade 2 and 3   one cavitations  -EC      Manual Rx 1 Duration 11  -EC      Manual Rx 2    Manual Rx 2 Location R subscapularis, teres minor and major, posterior deltoid  -EC      Manual Rx 2 Type STM in prone  -EC      Manual Rx 2 Duration 13  -EC      Manual Rx 3    Manual Rx 3 Location R GHJ posterior mobilizations  -EC      Manual Rx 3 Grade 2 and 3  -EC      Manual Rx 3 Duration 5  -EC        User Key  (r) = Recorded By, (t) = Taken By, (c) = Cosigned By    Initials Name Provider Type    EC Steve Andre PTA Physical Therapy Assistant                PT OP Goals       08/08/17 1351       PT Short Term Goals    STG Date to Achieve 08/29/17  -EC     STG 1 Pt will report 50% decrease in R shoulder pain with ROM activity.   -EC     STG 1 Progress Ongoing  -EC     STG 2 Pt will demonstrate 25% improvement in upright posture especially regarding thoracic extension.   -EC     STG 2 Progress Ongoing  -EC     Long Term Goals    LTG Date to Achieve 09/26/17  -EC     LTG 1 Pt will demonstrate improved thoracic extension by 50%.   -EC     LTG 1 Progress Ongoing  -EC     LTG 2 Pt will demonstrate full PROM of the right shoulder painfree  before discharge.   -EC     LTG 2 Progress Ongoing  -EC     LTG 2 Progress Comments IR improved post session  -EC     LTG 3 Pt will demonstrate AROM equal to that of the uninvolved side with pain no greater than 2/10.   -EC     LTG 3 Progress Ongoing  -EC     LTG 4 Pt will demonstrate improved  strength to at least 55lb on the right before d/c.   -EC     LTG 4 Progress Ongoing  -EC     LTG 5 Pt will show improved gross shoulder strength to 4/5 before d/c.   -EC     LTG 5 Progress Ongoing  -EC     LTG 6 Pt will be able to lift/carry >40lbs with pain <3/10 in order to demonstrate gains in functional ability.   -EC     LTG 6 Progress Ongoing  -EC     LTG 7 Pt will demonstrate improved scapular strength to 4-/5 in order to improve functional ability of the shoulder complex.   -EC     LTG 7 Progress Ongoing  -EC     Time Calculation    PT Goal Re-Cert Due Date 09/01/17  -EC       User Key  (r) = Recorded By, (t) = Taken By, (c) = Cosigned By    Initials Name Provider Type    PARKER Andre PTA Physical Therapy Assistant                Therapy Education       08/08/17 1447          Therapy Education    Given Symptoms/condition management  -EC      How Provided Verbal  -EC      Provided to Patient  -EC      Level of Understanding Verbalized  -EC        User Key  (r) = Recorded By, (t) = Taken By, (c) = Cosigned By    Initials Name Provider Type    EC Steve Andre PTA Physical Therapy Assistant                Time Calculation:   Start Time: 1351  Stop Time: 1440  Time Calculation (min): 49 min  Total Timed Code Minutes- PT: 49 minute(s)    Therapy Charges for Today     Code Description Service Date Service Provider Modifiers Qty    06018255030 HC PT ELEC STIM EA-PER 15 MIN 8/8/2017 Steve Andre PTA GP 1    62929478567 HC PT MANUAL THERAPY EA 15 MIN 8/8/2017 Steve Andre PTA GP 2                    Steve Andre PTA  8/8/2017

## 2017-08-10 ENCOUNTER — HOSPITAL ENCOUNTER (OUTPATIENT)
Dept: PHYSICAL THERAPY | Facility: HOSPITAL | Age: 57
Setting detail: THERAPIES SERIES
Discharge: HOME | End: 2017-08-10

## 2017-08-10 DIAGNOSIS — M75.121 COMPLETE TEAR OF RIGHT ROTATOR CUFF: ICD-10-CM

## 2017-08-10 DIAGNOSIS — M25.511 RIGHT SHOULDER PAIN, UNSPECIFIED CHRONICITY: Primary | ICD-10-CM

## 2017-08-10 PROCEDURE — 97110 THERAPEUTIC EXERCISES: CPT

## 2017-08-10 PROCEDURE — 97140 MANUAL THERAPY 1/> REGIONS: CPT

## 2017-08-10 NOTE — THERAPY TREATMENT NOTE
Outpatient Physical Therapy Ortho Treatment Note  [facility]     Patient Name: Vitaly Pantoja  : 1960  MRN: [id number]  Today's Date: 8/10/2017      Visit Date: 08/10/2017    Visit Dx:    ICD-10-CM ICD-9-CM   1. Right shoulder pain, unspecified chronicity M25.511 719.41   2. Complete tear of right rotator cuff M75.121 727.61       There is no problem list on file for this patient.       Past Medical History:   Diagnosis Date   • Back pain     current back pain per pt   • Chronic rheumatic arthritis    • Diabetes mellitus    • Hip dysplasia, congenital         Past Surgical History:   Procedure Laterality Date   • APPENDECTOMY     • JOINT REPLACEMENT Bilateral     TKA   • TONSILLECTOMY                               PT Assessment/Plan       08/10/17 9499       PT Assessment    Assessment Comments His thoracic spine continues to be hyperkyphotic thus limiting his scapular and shoulder range and positioning for proper proprioception and AROM activitiy. His PROM was full and painfree today on the right today and thus I progressed him with proprioception and gravity eliminated shoulder AROM exercises. He was unable to perform proprioception exercises against gravity w/o reporting increased shoulder pain (sharp) and thus we terminated these possible progressions.   -TC     PT Plan    PT Plan Comments We will continue to address postural awareness and stability, thoracic stiffness and scapular stability with progression of R shoulder and scapular proprioception and AROM in different functional positions as tolerated.   -TC       User Key  (r) = Recorded By, (t) = Taken By, (c) = Cosigned By    Initials Name Provider Type    TC Rivka Lewis, PT DPT Physical Therapist                    Exercises       08/10/17 1053          Subjective Comments    Subjective Comments Pt reported that his R arm has been aching all day today and is starting to let up. He has been playing [employer] a lot and thinks that might  be it.   -TC      Subjective Pain    Able to rate subjective pain? yes  -TC      Pre-Treatment Pain Level 7  -TC      Exercise 1    Exercise Name 1 R shoulder place and holds hooklying    perturbations   -TC      Sets 1 3  -TC      Time (Seconds) 1 30s ea  -TC      Exercise 2    Exercise Name 2 R shoulder reverse codmans CW/CCW  -TC      Reps 2 4  -TC      Time (Seconds) 2 30s ea  -TC      Exercise 3    Exercise Name 3 thoracic towel stretch   -TC      Cueing 3 Verbal;Tactile;Demo  -TC      Exercise 4    Exercise Name 4 standing CW/CCW wall ball    unable to tolerate this or AAROM flexion using wall  -TC      Cueing 4 Verbal;Tactile;Demo  -TC      Sets 4 4  -TC      Time (Seconds) 4 30s ea  -TC      Exercise 5    Exercise Name 5 scapular squeezes   -TC      Cueing 5 Verbal  -TC      Sets 5 2  -TC      Reps 5 10  -TC      Exercise 6    Exercise Name 6 hooklying, scapula stabilized, shoulder flexion 100-175 deg painfree  -TC      Cueing 6 Tactile  -TC      Sets 6 2  -TC      Reps 6 15  -TC      Exercise 7    Exercise Name 7 supine arm by side supported by towel roll, ER AROM   -TC      Cueing 7 Verbal  -TC      Sets 7 2  -TC      Reps 7 15  -TC        User Key  (r) = Recorded By, (t) = Taken By, (c) = Cosigned By    Initials Name Provider Type    TC Rivka Lewis, PT DPT Physical Therapist                        Manual Rx (last 36 hours)      Manual Treatments       08/10/17 1503          Manual Rx 1    Manual Rx 1 Location thoracic  -TC      Manual Rx 1 Type prone extension mobilizations  -TC      Manual Rx 1 Grade 2 and 3   one cavitations  -TC      Manual Rx 1 Duration 8  -TC      Manual Rx 2    Manual Rx 2 Location R subscapularis, teres minor and major, posterior deltoid  -TC      Manual Rx 2 Type STM in prone  -TC      Manual Rx 2 Duration 5  -TC      Manual Rx 3    Manual Rx 3 Location supine pect minor   -TC      Manual Rx 3 Type STM   -TC      Manual Rx 3 Grade moderate   -TC      Manual Rx 3 Duration 3   -TC      Manual Rx 4    Manual Rx 4 Location PROM in all planes to R shoulder except extension   -TC      Manual Rx 4 Type 1x10   -TC      Manual Rx 4 Duration 5  -TC        User Key  (r) = Recorded By, (t) = Taken By, (c) = Cosigned By    Initials Name Provider Type    TC Rivka Linmercedes Lewis, PT DPT Physical Therapist                PT OP Goals       08/10/17 1503       PT Short Term Goals    STG Date to Achieve 08/29/17  -TC     STG 1 Pt will report 50% decrease in R shoulder pain with ROM activity.   -TC     STG 1 Progress Ongoing  -TC     STG 2 Pt will demonstrate 25% improvement in upright posture especially regarding thoracic extension.   -TC     STG 2 Progress Ongoing  -TC     Long Term Goals    LTG Date to Achieve 09/26/17  -TC     LTG 1 Pt will demonstrate improved thoracic extension by 50%.   -TC     LTG 1 Progress Ongoing  -TC     LTG 1 Progress Comments we continue to address this, but he still demonstrates thoracic hyperkyphosis   -TC     LTG 2 Pt will demonstrate full PROM of the right shoulder painfree before discharge.   -TC     LTG 2 Progress Ongoing  -TC     LTG 2 Progress Comments patients PROM was full and painfree today upon assessment  -TC     LTG 3 Pt will demonstrate AROM equal to that of the uninvolved side with pain no greater than 2/10.   -TC     LTG 3 Progress Ongoing  -TC     LTG 4 Pt will demonstrate improved  strength to at least 55lb on the right before d/c.   -TC     LTG 4 Progress Ongoing  -TC     LTG 5 Pt will show improved gross shoulder strength to 4/5 before d/c.   -TC     LTG 5 Progress Ongoing  -TC     LTG 6 Pt will be able to lift/carry >40lbs with pain <3/10 in order to demonstrate gains in functional ability.   -TC     LTG 6 Progress Ongoing  -TC     LTG 7 Pt will demonstrate improved scapular strength to 4-/5 in order to improve functional ability of the shoulder complex.   -TC     LTG 7 Progress Ongoing  -TC     Time Calculation    PT Goal Re-Cert Due Date 09/01/17   -TC       User Key  (r) = Recorded By, (t) = Taken By, (c) = Cosigned By    Initials Name Provider Type    TC Rivka Lewis PT DPT Physical Therapist                Therapy Education       08/10/17 1549          Therapy Education    Given HEP;Symptoms/condition management;Posture/body mechanics  -TC      Program Reinforced  -TC      How Provided Verbal;Demonstration  -TC      Provided to Patient  -TC      Level of Understanding Teach back education performed;Verbalized;Demonstrated  -TC        User Key  (r) = Recorded By, (t) = Taken By, (c) = Cosigned By    Initials Name Provider Type    TC Rivka Lewis PT DPT Physical Therapist                Time Calculation:   Start Time: 1503  Stop Time: 1549  Time Calculation (min): 46 min  Total Timed Code Minutes- PT: 46 minute(s)    Therapy Charges for Today     Code Description Service Date Service Provider Modifiers Qty    13768937264 HC PT MANUAL THERAPY EA 15 MIN 8/10/2017 Rivka Lewis PT DPT GP 1    02768558495 HC PT THER PROC EA 15 MIN 8/10/2017 Rivka Lewis PT DPT GP 2                    Rivka Lewis PT DPT  8/10/2017

## 2017-08-15 ENCOUNTER — HOSPITAL ENCOUNTER (OUTPATIENT)
Dept: PHYSICAL THERAPY | Facility: HOSPITAL | Age: 57
Setting detail: THERAPIES SERIES
Discharge: HOME | End: 2017-08-15

## 2017-08-15 DIAGNOSIS — M25.511 RIGHT SHOULDER PAIN, UNSPECIFIED CHRONICITY: Primary | ICD-10-CM

## 2017-08-15 DIAGNOSIS — M75.121 COMPLETE TEAR OF RIGHT ROTATOR CUFF: ICD-10-CM

## 2017-08-15 PROCEDURE — 97110 THERAPEUTIC EXERCISES: CPT

## 2017-08-15 PROCEDURE — 97140 MANUAL THERAPY 1/> REGIONS: CPT

## 2017-08-15 NOTE — THERAPY TREATMENT NOTE
Outpatient Physical Therapy Ortho Treatment Note  [facility]     Patient Name: Vitaly Pantoja  : 1960  MRN: [id number]  Today's Date: 8/15/2017      Visit Date: 08/15/2017    Visit Dx:    ICD-10-CM ICD-9-CM   1. Right shoulder pain, unspecified chronicity M25.511 719.41   2. Complete tear of right rotator cuff M75.121 727.61       There is no problem list on file for this patient.       Past Medical History:   Diagnosis Date   • Back pain     current back pain per pt   • Chronic rheumatic arthritis    • Diabetes mellitus    • Hip dysplasia, congenital         Past Surgical History:   Procedure Laterality Date   • APPENDECTOMY     • JOINT REPLACEMENT Bilateral     TKA   • TONSILLECTOMY                               PT Assessment/Plan       08/15/17 1644       PT Assessment    Assessment Comments We continue to address his hypomobile thoracic spine but are now that his shoulder PROM is restored we are promoting improving pathomechanics and working towards AAROM and AROM as tolerated in different functional positions avoiding certain types of pain.   -TC     PT Plan    PT Plan Comments We will progress his HEP if he is not flared next session. Address thoracic mobility and continue to promote scapular and shoulder stability with progression of AAROm to AROM and proprioception activities.   -TC       User Key  (r) = Recorded By, (t) = Taken By, (c) = Cosigned By    Initials Name Provider Type    TC Rivka Lewis, PT DPT Physical Therapist                    Exercises       08/15/17 1330          Subjective Comments    Subjective Comments Pt stated that he feels pretty good right now just a little sore, ,but he thinks its the way he slept on it. It did get up at a 9 over the weekend because he was moving boxed of records.    -TC      Subjective Pain    Able to rate subjective pain? yes  -TC      Pre-Treatment Pain Level 4  -TC      Exercise 1    Exercise Name 1 hooklying at 45 deg; R shoulder place  and holds hooklying    perturbations   -TC      Sets 1 3  -TC      Time (Seconds) 1 30s ea  -TC      Exercise 2    Exercise Name 2 hooklying 45 deg; R shoulder reverse codmans CW/CCW   1lb weight   -TC      Reps 2 4  -TC      Time (Seconds) 2 1 min ea  -TC      Exercise 3    Exercise Name 3 thoracic towel stretch   -TC      Cueing 3 Verbal;Tactile;Demo  -TC      Exercise 4    Exercise Name 4 --  -TC      Cueing 4 --  -TC      Sets 4 --  -TC      Time (Seconds) 4 --  -TC      Exercise 5    Exercise Name 5 scapular squeezes hooklying at 45 deg; towell roll supporting elbow   -TC      Cueing 5 Verbal  -TC      Sets 5 2  -TC      Reps 5 10  -TC      Exercise 6    Exercise Name 6 --  -TC      Cueing 6 --  -TC      Sets 6 --  -TC      Reps 6 --  -TC      Exercise 7    Exercise Name 7 L sidelying arm by side, towel roll supporting; shoulder ER  -TC      Cueing 7 Verbal  -TC      Sets 7 2  -TC      Reps 7 15  -TC      Exercise 8    Exercise Name 8 standing wall slides  -TC      Cueing 8 Demo  -TC      Sets 8 2  -TC      Reps 8 15  -TC      Exercise 9    Exercise Name 9 sidelying AAROM shoulder flexion w/ scapular assist from therapist  -TC        User Key  (r) = Recorded By, (t) = Taken By, (c) = Cosigned By    Initials Name Provider Type    TC Rivka Lewis, PT DPT Physical Therapist                               PT OP Goals       08/15/17 1330       PT Short Term Goals    STG Date to Achieve 08/29/17  -TC     STG 1 Pt will report 50% decrease in R shoulder pain with ROM activity.   -TC     STG 1 Progress Ongoing  -TC     STG 2 Pt will demonstrate 25% improvement in upright posture especially regarding thoracic extension.   -TC     STG 2 Progress Ongoing  -TC     STG 2 Progress Comments improving but still limited 50-75%   -TC     Long Term Goals    LTG Date to Achieve 09/26/17  -TC     LTG 1 Pt will demonstrate improved thoracic extension by 50%.   -TC     LTG 1 Progress Ongoing  -TC     LTG 2 Pt will demonstrate  full PROM of the right shoulder painfree before discharge.   -TC     LTG 2 Progress Met  -TC     LTG 2 Progress Comments PROM was full and painfree once again today  -TC     LTG 3 Pt will demonstrate AROM equal to that of the uninvolved side with pain no greater than 2/10.   -TC     LTG 3 Progress Ongoing  -TC     LTG 4 Pt will demonstrate improved  strength to at least 55lb on the right before d/c.   -TC     LTG 4 Progress Ongoing  -TC     LTG 5 Pt will show improved gross shoulder strength to 4/5 before d/c.   -TC     LTG 5 Progress Ongoing  -TC     LTG 6 Pt will be able to lift/carry >40lbs with pain <3/10 in order to demonstrate gains in functional ability.   -TC     LTG 6 Progress Ongoing  -TC     LTG 7 Pt will demonstrate improved scapular strength to 4-/5 in order to improve functional ability of the shoulder complex.   -TC     LTG 7 Progress Ongoing  -TC     Time Calculation    PT Goal Re-Cert Due Date 09/01/17  -TC       User Key  (r) = Recorded By, (t) = Taken By, (c) = Cosigned By    Initials Name Provider Type    TC Rivka Lewis, PT DPT Physical Therapist                Therapy Education       08/15/17 1644          Therapy Education    Given HEP  -TC      Program Reinforced  -TC      How Provided Verbal  -TC      Provided to Patient  -TC      Level of Understanding Teach back education performed;Verbalized;Demonstrated  -TC        User Key  (r) = Recorded By, (t) = Taken By, (c) = Cosigned By    Initials Name Provider Type    TC Rivka Lewis PT DPT Physical Therapist                Time Calculation:   Start Time: 1330  Stop Time: 1417  Time Calculation (min): 47 min  Total Timed Code Minutes- PT: 47 minute(s)    Therapy Charges for Today     Code Description Service Date Service Provider Modifiers Qty    58393772547 HC PT MANUAL THERAPY EA 15 MIN 8/15/2017 Rivka Lewis, PT DPT GP 1    31400693392 HC PT THER PROC EA 15 MIN 8/15/2017 Rivka Lewis, PT DPT GP 2                     Rivka Lewis, PT DPT  8/15/2017

## 2017-08-17 ENCOUNTER — HOSPITAL ENCOUNTER (OUTPATIENT)
Dept: PHYSICAL THERAPY | Facility: HOSPITAL | Age: 57
Setting detail: THERAPIES SERIES
Discharge: HOME | End: 2017-08-17

## 2017-08-17 DIAGNOSIS — M25.511 RIGHT SHOULDER PAIN, UNSPECIFIED CHRONICITY: Primary | ICD-10-CM

## 2017-08-17 DIAGNOSIS — M75.121 COMPLETE TEAR OF RIGHT ROTATOR CUFF: ICD-10-CM

## 2017-08-17 PROCEDURE — 97110 THERAPEUTIC EXERCISES: CPT

## 2017-08-17 NOTE — THERAPY TREATMENT NOTE
Outpatient Physical Therapy Ortho Treatment Note  [facility]     Patient Name: Vitaly Pantoja  : 1960  MRN: [id number]  Today's Date: 2017      Visit Date: 2017    Visit Dx:    ICD-10-CM ICD-9-CM   1. Right shoulder pain, unspecified chronicity M25.511 719.41   2. Complete tear of right rotator cuff M75.121 727.61       There is no problem list on file for this patient.       Past Medical History:   Diagnosis Date   • Back pain     current back pain per pt   • Chronic rheumatic arthritis    • Diabetes mellitus    • Hip dysplasia, congenital         Past Surgical History:   Procedure Laterality Date   • APPENDECTOMY     • JOINT REPLACEMENT Bilateral     TKA   • TONSILLECTOMY                               PT Assessment/Plan       17 1221       PT Assessment    Assessment Comments We continue to address his thoracic mobility and progress his R shoulder and scapular AAROM, AROM and stability in order to improve pathomechanics despite his lack of two rotator cuff muscles.   -TC     PT Plan    PT Plan Comments Assess added HEP next session. Continue to address thoracic mobility and shoulder/scapular stability and AROM to restore pathomechancis.   -TC       User Key  (r) = Recorded By, (t) = Taken By, (c) = Cosigned By    Initials Name Provider Type    TC Rivka Lewis, PT DPT Physical Therapist                    Exercises       17 8443          Subjective Comments    Subjective Comments Pt stated that he had not really had pain since our last session except for some soreness this morning when he first woke up that is gone now.   -TC      Subjective Pain    Able to rate subjective pain? yes  -TC      Pre-Treatment Pain Level 0  -TC      Post-Treatment Pain Level 0  -TC      Exercise 1    Exercise Name 1 hooklying at 45 deg; R shoulder place and holds hooklying    perturbations   -TC      Sets 1 3  -TC      Time (Seconds) 1 30s ea  -TC      Exercise 2    Exercise Name 2  hooklying at 45 deg; shoulder flexion  painfree with tbar   2lb   -TC      Cueing 2 Verbal;Tactile;Demo  -TC      Sets 2 3  -TC      Reps 2 15  -TC      Exercise 3    Exercise Name 3 L sidelying ER --added to HEP   -TC      Cueing 3 Verbal;Tactile;Demo  -TC      Sets 3 3  -TC      Reps 3 15  -TC      Exercise 4    Exercise Name 4 SA punches with therapist assist   -TC      Cueing 4 Verbal;Tactile;Demo  -TC      Sets 4 3  -TC      Reps 4 8  -TC      Exercise 5    Exercise Name 5 thoracic and pect stretch at wall   -TC      Cueing 5 Verbal  -TC      Sets 5 3  -TC      Time (Minutes) 5 1  -TC      Exercise 6    Exercise Name 6 place and holds in standing using sm wall ball    elbow bent   -TC      Cueing 6 Tactile  -TC      Sets 6 4  -TC        User Key  (r) = Recorded By, (t) = Taken By, (c) = Cosigned By    Initials Name Provider Type    TC Rivka Lewis, PT DPT Physical Therapist                               PT OP Goals       08/17/17 1125       PT Short Term Goals    STG Date to Achieve 08/29/17  -TC     STG 1 Pt will report 50% decrease in R shoulder pain with ROM activity.   -TC     STG 1 Progress Ongoing  -TC     STG 1 Progress Comments pt was painfree today   -TC     STG 2 Pt will demonstrate 25% improvement in upright posture especially regarding thoracic extension.   -TC     STG 2 Progress Ongoing  -TC     Long Term Goals    LTG Date to Achieve 09/26/17  -TC     LTG 1 Pt will demonstrate improved thoracic extension by 50%.   -TC     LTG 1 Progress Ongoing  -     LTG 1 Progress Comments working to improve this   -TC     LTG 2 Pt will demonstrate full PROM of the right shoulder painfree before discharge.   -TC     LTG 2 Progress Met  -TC     LTG 3 Pt will demonstrate AROM equal to that of the uninvolved side with pain no greater than 2/10.   -TC     LTG 3 Progress Ongoing  -TC     LTG 4 Pt will demonstrate improved  strength to at least 55lb on the right before d/c.   -TC     LTG 4 Progress  Ongoing  -TC     LTG 5 Pt will show improved gross shoulder strength to 4/5 before d/c.   -TC     LTG 5 Progress Ongoing  -TC     LTG 6 Pt will be able to lift/carry >40lbs with pain <3/10 in order to demonstrate gains in functional ability.   -TC     LTG 6 Progress Ongoing  -TC     LTG 7 Pt will demonstrate improved scapular strength to 4-/5 in order to improve functional ability of the shoulder complex.   -TC     LTG 7 Progress Ongoing  -TC     Time Calculation    PT Goal Re-Cert Due Date 09/01/17  -TC       User Key  (r) = Recorded By, (t) = Taken By, (c) = Cosigned By    Initials Name Provider Type    TC Rivka Lewis PT DPT Physical Therapist                Therapy Education       08/17/17 1221          Therapy Education    Given HEP;Symptoms/condition management;Posture/body mechanics  -TC      Program Reinforced  -TC      How Provided Verbal  -TC      Provided to Patient  -TC      Level of Understanding Teach back education performed;Verbalized;Demonstrated  -TC        User Key  (r) = Recorded By, (t) = Taken By, (c) = Cosigned By    Initials Name Provider Type    TC Rivka Lewis, PT DPT Physical Therapist                Time Calculation:   Start Time: 1125  Stop Time: 1208  Time Calculation (min): 43 min  Total Timed Code Minutes- PT: 43 minute(s)    Therapy Charges for Today     Code Description Service Date Service Provider Modifiers Qty    20316711705  PT THER PROC EA 15 MIN 8/17/2017 Rivka Lewis, PT DPT GP 3                    Rivka Lewis PT DPT  8/17/2017

## 2017-08-23 ENCOUNTER — HOSPITAL ENCOUNTER (OUTPATIENT)
Dept: PHYSICAL THERAPY | Facility: HOSPITAL | Age: 57
Setting detail: THERAPIES SERIES
Discharge: HOME | End: 2017-08-23

## 2017-08-23 DIAGNOSIS — M25.511 RIGHT SHOULDER PAIN, UNSPECIFIED CHRONICITY: Primary | ICD-10-CM

## 2017-08-23 DIAGNOSIS — M75.121 COMPLETE TEAR OF RIGHT ROTATOR CUFF: ICD-10-CM

## 2017-08-23 PROCEDURE — 97140 MANUAL THERAPY 1/> REGIONS: CPT

## 2017-08-23 PROCEDURE — 97110 THERAPEUTIC EXERCISES: CPT

## 2017-08-23 PROCEDURE — 97032 APPL MODALITY 1+ESTIM EA 15: CPT

## 2017-08-23 NOTE — THERAPY TREATMENT NOTE
Outpatient Physical Therapy Ortho Treatment Note  [facility]     Patient Name: Vitaly Pantoja  : 1960  MRN: [id number]  Today's Date: 2017      Visit Date: 2017    Visit Dx:    ICD-10-CM ICD-9-CM   1. Right shoulder pain, unspecified chronicity M25.511 719.41   2. Complete tear of right rotator cuff M75.121 727.61       There is no problem list on file for this patient.       Past Medical History:   Diagnosis Date   • Back pain     current back pain per pt   • Chronic rheumatic arthritis    • Diabetes mellitus    • Hip dysplasia, congenital         Past Surgical History:   Procedure Laterality Date   • APPENDECTOMY     • JOINT REPLACEMENT Bilateral     TKA   • TONSILLECTOMY                               PT Assessment/Plan       17 1154       PT Assessment    Assessment Comments I am suspiciousl that his fall he suffered last night may have damaged his R shoulder further. He had difficulty maintaining isometric holds today but no increase in pain.  -EC     PT Plan    PT Plan Comments Progress as symptoms allow.  -EC       User Key  (r) = Recorded By, (t) = Taken By, (c) = Cosigned By    Initials Name Provider Type    EC Steve Andre PTA Physical Therapy Assistant                Modalities       17 1100          ELECTRICAL STIMULATION    Attended/Unattended Attended  -EC      Stimulation Type --   Chronic Inflammation Mode Laser stim  -EC      Location/Electrode Placement/Other R posterior shoulder globally  -EC      Rx Minutes 10 mins  -EC        User Key  (r) = Recorded By, (t) = Taken By, (c) = Cosigned By    Initials Name Provider Type    PARKER Andre PTA Physical Therapy Assistant                Exercises       17 1100          Subjective Comments    Subjective Comments He reports he fell and landed on his shoulder   -EC      Subjective Pain    Able to rate subjective pain? yes  -EC      Pre-Treatment Pain Level 5  -EC      Post-Treatment Pain Level 5   -EC      Exercise 1    Exercise Name 1 supine CW/CCW  -EC      Time (Minutes) 1 1  -EC      Additional Comments #1  -EC      Exercise 2    Exercise Name 2 isometric ER/IR with manual perturbations with UE 90/90  -EC      Sets 2 4  -EC      Time (Seconds) 2 30  -EC      Exercise 3    Exercise Name 3 isometric flexion holds at 45, 90, and 120 degrees  -EC      Sets 3 3  -EC      Time (Seconds) 3 30  -EC        User Key  (r) = Recorded By, (t) = Taken By, (c) = Cosigned By    Initials Name Provider Type    EC Steve Andre PTA Physical Therapy Assistant                        Manual Rx (last 36 hours)      Manual Treatments       08/23/17 1100          Manual Rx 1    Manual Rx 1 Location thoracic  -EC      Manual Rx 1 Type prone extension mobilizations  -EC      Manual Rx 1 Grade 2 and 3  -EC      Manual Rx 1 Duration 10  -EC        User Key  (r) = Recorded By, (t) = Taken By, (c) = Cosigned By    Initials Name Provider Type    EC Steve Andre PTA Physical Therapy Assistant                PT OP Goals       08/23/17 1105       PT Short Term Goals    STG Date to Achieve 08/29/17  -EC     STG 1 Pt will report 50% decrease in R shoulder pain with ROM activity.   -EC     STG 1 Progress Ongoing  -EC     STG 1 Progress Comments 4/10 today attributed to recent  -EC     STG 2 Pt will demonstrate 25% improvement in upright posture especially regarding thoracic extension.   -EC     STG 2 Progress Ongoing  -EC     Long Term Goals    LTG Date to Achieve 09/26/17  -EC     LTG 1 Pt will demonstrate improved thoracic extension by 50%.   -EC     LTG 1 Progress Ongoing  -EC     LTG 2 Pt will demonstrate full PROM of the right shoulder painfree before discharge.   -EC     LTG 2 Progress Met  -EC     LTG 3 Pt will demonstrate AROM equal to that of the uninvolved side with pain no greater than 2/10.   -EC     LTG 3 Progress Ongoing  -EC     LTG 4 Pt will demonstrate improved  strength to at least 55lb on the right before d/c.    -EC     LTG 4 Progress Ongoing  -EC     LTG 5 Pt will show improved gross shoulder strength to 4/5 before d/c.   -EC     LTG 5 Progress Ongoing  -EC     LTG 6 Pt will be able to lift/carry >40lbs with pain <3/10 in order to demonstrate gains in functional ability.   -EC     LTG 6 Progress Ongoing  -EC     LTG 7 Pt will demonstrate improved scapular strength to 4-/5 in order to improve functional ability of the shoulder complex.   -EC     LTG 7 Progress Ongoing  -EC     Time Calculation    PT Goal Re-Cert Due Date 09/01/17  -EC       User Key  (r) = Recorded By, (t) = Taken By, (c) = Cosigned By    Initials Name Provider Type    PARKER Andre PTA Physical Therapy Assistant                Therapy Education       08/23/17 1154          Therapy Education    Given Symptoms/condition management  -EC      How Provided Verbal  -EC      Provided to Patient  -EC      Level of Understanding Verbalized  -EC        User Key  (r) = Recorded By, (t) = Taken By, (c) = Cosigned By    Initials Name Provider Type    PARKER Andre PTA Physical Therapy Assistant                Time Calculation:   Start Time: 1105  Stop Time: 1145  Time Calculation (min): 40 min  Total Timed Code Minutes- PT: 40 minute(s)    Therapy Charges for Today     Code Description Service Date Service Provider Modifiers Qty    11393725721 HC PT MANUAL THERAPY EA 15 MIN 8/23/2017 Steve Andre PTA GP 1    50210232264 HC PT ELEC STIM EA-PER 15 MIN 8/23/2017 Steve Andre, MICHELLE GP 1    47646760762 HC PT THER PROC EA 15 MIN 8/23/2017 Steve Andre PTA GP 1                    Steve Andre PTA  8/23/2017

## 2017-08-24 ENCOUNTER — HOSPITAL ENCOUNTER (OUTPATIENT)
Dept: PHYSICAL THERAPY | Facility: HOSPITAL | Age: 57
Setting detail: THERAPIES SERIES
Discharge: HOME | End: 2017-08-24

## 2017-08-24 DIAGNOSIS — M25.511 RIGHT SHOULDER PAIN, UNSPECIFIED CHRONICITY: Primary | ICD-10-CM

## 2017-08-24 DIAGNOSIS — M75.121 COMPLETE TEAR OF RIGHT ROTATOR CUFF: ICD-10-CM

## 2017-08-24 PROCEDURE — 97110 THERAPEUTIC EXERCISES: CPT

## 2017-08-24 PROCEDURE — 97032 APPL MODALITY 1+ESTIM EA 15: CPT

## 2017-08-24 PROCEDURE — 97140 MANUAL THERAPY 1/> REGIONS: CPT

## 2017-08-24 NOTE — THERAPY TREATMENT NOTE
Outpatient Physical Therapy Ortho Treatment Note  [facility]     Patient Name: Vitaly Pantoja  : 1960  MRN: [id number]  Today's Date: 2017      Visit Date: 2017    Visit Dx:    ICD-10-CM ICD-9-CM   1. Right shoulder pain, unspecified chronicity M25.511 719.41   2. Complete tear of right rotator cuff M75.121 727.61       There is no problem list on file for this patient.       Past Medical History:   Diagnosis Date   • Back pain     current back pain per pt   • Chronic rheumatic arthritis    • Diabetes mellitus    • Hip dysplasia, congenital         Past Surgical History:   Procedure Laterality Date   • APPENDECTOMY     • JOINT REPLACEMENT Bilateral     TKA   • TONSILLECTOMY                               PT Assessment/Plan       17 1401       PT Assessment    Assessment Comments His pain and strength were slightly improved today as compared to yesterday. He continues to present with a significant thoracic kyphosis which is very stiff and we will ccontinue to work to improve this and he has significant weakness in his B lower and middle traps.  -EC     PT Plan    PT Plan Comments Progress as symptoms allow.  -EC       User Key  (r) = Recorded By, (t) = Taken By, (c) = Cosigned By    Initials Name Provider Type    EC Steve Andre PTA Physical Therapy Assistant                Modalities       17 1300          ELECTRICAL STIMULATION    Attended/Unattended Attended  -EC      Stimulation Type --   Chronic Inflammation Mode Laser stim  -EC      Location/Electrode Placement/Other R posterior shoulder globally  -EC      Rx Minutes 10 mins  -EC        User Key  (r) = Recorded By, (t) = Taken By, (c) = Cosigned By    Initials Name Provider Type    EC Steve Andre PTA Physical Therapy Assistant                Exercises       17 1300          Subjective Comments    Subjective Comments He reports R shoulder is a little better  -EC      Subjective Pain    Able to rate  "subjective pain? yes  -EC      Pre-Treatment Pain Level 4  -EC      Post-Treatment Pain Level 3  -EC      Exercise 1    Exercise Name 1 prone \"I's\"  -EC      Exercise 2    Exercise Name 2 attempted prone W' and Y''s  -EC      Exercise 3    Exercise Name 3 isometric flexion holds at 45, 90, and 120 degrees  -EC      Sets 3 3  -EC      Time (Seconds) 3 30  -EC        User Key  (r) = Recorded By, (t) = Taken By, (c) = Cosigned By    Initials Name Provider Type    EC Steve Andre PTA Physical Therapy Assistant                        Manual Rx (last 36 hours)      Manual Treatments       08/24/17 1300 08/23/17 1100       Manual Rx 1    Manual Rx 1 Location thoracic  -EC thoracic  -EC     Manual Rx 1 Type prone extension mobilizations  -EC prone extension mobilizations  -EC     Manual Rx 1 Grade 2 and 3  -EC 2 and 3  -EC     Manual Rx 1 Duration 10  -EC 10  -EC     Manual Rx 2    Manual Rx 2 Location R bicep   -EC      Manual Rx 2 Type IASTM with EDGE tool  -EC      Manual Rx 2 Duration 5  -EC        User Key  (r) = Recorded By, (t) = Taken By, (c) = Cosigned By    Initials Name Provider Type    EC Steve Andre PTA Physical Therapy Assistant                PT OP Goals       08/24/17 1302       PT Short Term Goals    STG Date to Achieve 08/29/17  -EC     STG 1 Pt will report 50% decrease in R shoulder pain with ROM activity.   -EC     STG 1 Progress Ongoing  -EC     STG 2 Pt will demonstrate 25% improvement in upright posture especially regarding thoracic extension.   -EC     STG 2 Progress Ongoing  -EC     Long Term Goals    LTG Date to Achieve 09/26/17  -EC     LTG 1 Pt will demonstrate improved thoracic extension by 50%.   -EC     LTG 1 Progress Ongoing  -EC     LTG 1 Progress Comments his thoracic region reainss tight  -EC     LTG 2 Pt will demonstrate full PROM of the right shoulder painfree before discharge.   -EC     LTG 2 Progress Met  -EC     LTG 3 Pt will demonstrate AROM equal to that of the uninvolved " side with pain no greater than 2/10.   -EC     LTG 3 Progress Ongoing  -EC     LTG 4 Pt will demonstrate improved  strength to at least 55lb on the right before d/c.   -EC     LTG 4 Progress Ongoing  -EC     LTG 5 Pt will show improved gross shoulder strength to 4/5 before d/c.   -EC     LTG 5 Progress Ongoing  -EC     LTG 6 Pt will be able to lift/carry >40lbs with pain <3/10 in order to demonstrate gains in functional ability.   -EC     LTG 6 Progress Ongoing  -EC     LTG 7 Pt will demonstrate improved scapular strength to 4-/5 in order to improve functional ability of the shoulder complex.   -EC     LTG 7 Progress Ongoing  -EC     Time Calculation    PT Goal Re-Cert Due Date 09/01/17  -EC       User Key  (r) = Recorded By, (t) = Taken By, (c) = Cosigned By    Initials Name Provider Type    PARKER Andre PTA Physical Therapy Assistant                Therapy Education       08/24/17 1401          Therapy Education    Given Symptoms/condition management  -EC      How Provided Verbal  -EC      Provided to Patient  -EC      Level of Understanding Verbalized  -EC        User Key  (r) = Recorded By, (t) = Taken By, (c) = Cosigned By    Initials Name Provider Type    PARKER Andre PTA Physical Therapy Assistant                Time Calculation:   Start Time: 1302    Therapy Charges for Today     Code Description Service Date Service Provider Modifiers Qty    15841818615 HC PT ELEC STIM EA-PER 15 MIN 8/24/2017 Steve Andre PTA GP 1    50835677473 HC PT MANUAL THERAPY EA 15 MIN 8/24/2017 Steve Andre PTA GP 1    23720895932 HC PT THER PROC EA 15 MIN 8/24/2017 Steve Andre PTA GP 1                    Steve Andre PTA  8/24/2017

## 2017-08-29 ENCOUNTER — APPOINTMENT (OUTPATIENT)
Dept: PHYSICAL THERAPY | Facility: HOSPITAL | Age: 57
End: 2017-08-29

## 2017-08-31 ENCOUNTER — APPOINTMENT (OUTPATIENT)
Dept: PHYSICAL THERAPY | Facility: HOSPITAL | Age: 57
End: 2017-08-31

## 2017-09-05 ENCOUNTER — APPOINTMENT (OUTPATIENT)
Dept: PHYSICAL THERAPY | Facility: HOSPITAL | Age: 57
End: 2017-09-05

## 2017-09-07 ENCOUNTER — APPOINTMENT (OUTPATIENT)
Dept: PHYSICAL THERAPY | Facility: HOSPITAL | Age: 57
End: 2017-09-07

## 2017-09-12 ENCOUNTER — APPOINTMENT (OUTPATIENT)
Dept: PHYSICAL THERAPY | Facility: HOSPITAL | Age: 57
End: 2017-09-12

## 2017-09-14 ENCOUNTER — APPOINTMENT (OUTPATIENT)
Dept: PHYSICAL THERAPY | Facility: HOSPITAL | Age: 57
End: 2017-09-14

## 2017-09-19 ENCOUNTER — APPOINTMENT (OUTPATIENT)
Dept: PHYSICAL THERAPY | Facility: HOSPITAL | Age: 57
End: 2017-09-19

## 2017-09-21 ENCOUNTER — APPOINTMENT (OUTPATIENT)
Dept: PHYSICAL THERAPY | Facility: HOSPITAL | Age: 57
End: 2017-09-21

## 2017-09-26 ENCOUNTER — APPOINTMENT (OUTPATIENT)
Dept: PHYSICAL THERAPY | Facility: HOSPITAL | Age: 57
End: 2017-09-26

## 2017-09-28 ENCOUNTER — APPOINTMENT (OUTPATIENT)
Dept: PHYSICAL THERAPY | Facility: HOSPITAL | Age: 57
End: 2017-09-28

## 2017-10-10 ENCOUNTER — DOCUMENTATION (OUTPATIENT)
Dept: PHYSICAL THERAPY | Facility: HOSPITAL | Age: 57
End: 2017-10-10

## 2017-10-10 DIAGNOSIS — M75.121 COMPLETE TEAR OF RIGHT ROTATOR CUFF: ICD-10-CM

## 2017-10-10 DIAGNOSIS — M25.511 RIGHT SHOULDER PAIN, UNSPECIFIED CHRONICITY: Primary | ICD-10-CM

## 2017-10-10 NOTE — THERAPY DISCHARGE NOTE
Outpatient Physical Therapy Discharge Summary         Patient Name: Vitaly Pantoja  : 1960  MRN: [id number]    Today's Date: 10/10/2017    Visit Dx:    ICD-10-CM ICD-9-CM   1. Right shoulder pain, unspecified chronicity M25.511 719.41   2. Complete tear of right rotator cuff M75.121 727.61             PT OP Goals       10/10/17 1519       PT Short Term Goals    STG Date to Achieve 17  -ALEXANDRA     STG 1 Pt will report 50% decrease in R shoulder pain with ROM activity.   -ALEXANDRA     STG 1 Progress Not Met  -ALEXANDRA     STG 1 Progress Comments Patient has not been seen since 2017, therefore was not able to formally assess today.  -ALEXANDRA     STG 2 Pt will demonstrate 25% improvement in upright posture especially regarding thoracic extension.   -ALEXANDRA     STG 2 Progress Not Met  -ALEXANDRA     STG 2 Progress Comments It was recorded on 8/15/2017 that his posture was improving, but continued to be limited by 50-75%.  -ALEXANDRA     Long Term Goals    LTG Date to Achieve 17  -ALEXANDRA     LTG 1 Pt will demonstrate improved thoracic extension by 50%.   -ALEXANDRA     LTG 1 Progress Not Met  -ALEXANDRA     LTG 1 Progress Comments It was recorded on his last visit that his thoracic region continued to be tight.  -ALEXANDRA     LTG 2 Pt will demonstrate full PROM of the right shoulder painfree before discharge.   -ALEXANDRA     LTG 2 Progress Met  -ALEXANDRA     LTG 3 Pt will demonstrate AROM equal to that of the uninvolved side with pain no greater than 2/10.   -ALEXANDRA     LTG 3 Progress Not Met  -ALEXANDRA     LTG 3 Progress Comments Patient has not been seen since 2017, therefore was not able to formally assess today.  -ALEXANDRA     LTG 4 Pt will demonstrate improved  strength to at least 55lb on the right before d/c.   -ALEXANDRA     LTG 4 Progress Not Met  -ALEXANDRA     LTG 4 Progress Comments Patient has not been seen since 2017, therefore was not able to formally assess today.  -ALEXANDRA     LTG 5 Pt will show improved gross shoulder strength to 4/5 before d/c.   -ALEXANDRA     LTG 5  Progress Not Met  -ALEXANDRA     LTG 5 Progress Comments Patient has not been seen since 8/24/2017, therefore was not able to formally assess today.  -ALEXANDRA     LTG 6 Pt will be able to lift/carry >40lbs with pain <3/10 in order to demonstrate gains in functional ability.   -ALEXANDRA     LTG 6 Progress Not Met  -ALEXANDRA     LTG 6 Progress Comments Patient has not been seen since 8/24/2017, therefore was not able to formally assess today.  -ALEXANDRA     LTG 7 Pt will demonstrate improved scapular strength to 4-/5 in order to improve functional ability of the shoulder complex.   -ALEXANDRA     LTG 7 Progress Not Met  -ALEXANDRA     LTG 7 Progress Comments Patient has not been seen since 8/24/2017, therefore was not able to formally assess today.  -ALEXANDRA       User Key  (r) = Recorded By, (t) = Taken By, (c) = Cosigned By    Initials Name Provider Type    ALEXANDRA Barlow, PTA Physical Therapy Assistant          OP PT Discharge Summary  Date of Discharge: 10/10/17  Reason for Discharge: Unable to participate (He did not have transportation.)  Outcomes Achieved: Refer to plan of care for updates on goals achieved  Discharge Destination: Home without follow-up, Home with home program  Discharge Instructions: Patient had completed 7 appointments since his intial evaluation, however he has not been seen since 8/24/2017 due to not being able to participate.  Therefore, we are discharging at this time.  We were working on building a HEP for patient prior to discharge.      Time Calculation:                    Maxwell Barlow PTA  10/10/2017

## 2018-02-02 ENCOUNTER — TRANSCRIBE ORDERS (OUTPATIENT)
Dept: ADMINISTRATIVE | Facility: HOSPITAL | Age: 58
End: 2018-02-02

## 2018-02-02 ENCOUNTER — LAB (OUTPATIENT)
Dept: LAB | Facility: HOSPITAL | Age: 58
End: 2018-02-02

## 2018-02-02 DIAGNOSIS — Z00.00 ROUTINE GENERAL MEDICAL EXAMINATION AT A HEALTH CARE FACILITY: Primary | ICD-10-CM

## 2018-02-02 DIAGNOSIS — Z00.00 ADULT GENERAL MEDICAL EXAM: Primary | ICD-10-CM

## 2018-02-02 LAB
ALBUMIN SERPL-MCNC: 4.3 G/DL (ref 3.5–5)
ALBUMIN/GLOB SERPL: 1.7 G/DL (ref 1.1–2.5)
ALP SERPL-CCNC: 125 U/L (ref 24–120)
ALT SERPL W P-5'-P-CCNC: 29 U/L (ref 0–54)
ANION GAP SERPL CALCULATED.3IONS-SCNC: 13 MMOL/L (ref 4–13)
AST SERPL-CCNC: 20 U/L (ref 7–45)
BACTERIA UR QL AUTO: ABNORMAL /HPF
BASOPHILS # BLD AUTO: 0.07 10*3/MM3 (ref 0–0.2)
BASOPHILS NFR BLD AUTO: 1 % (ref 0–2)
BILIRUB SERPL-MCNC: 0.5 MG/DL (ref 0.1–1)
BILIRUB UR QL STRIP: NEGATIVE
BUN BLD-MCNC: 6 MG/DL (ref 5–21)
BUN/CREAT SERPL: 9.7 (ref 7–25)
CALCIUM SPEC-SCNC: 9.1 MG/DL (ref 8.4–10.4)
CHLORIDE SERPL-SCNC: 92 MMOL/L (ref 98–110)
CLARITY UR: CLEAR
CO2 SERPL-SCNC: 28 MMOL/L (ref 24–31)
COLOR UR: YELLOW
CREAT BLD-MCNC: 0.62 MG/DL (ref 0.5–1.4)
DEPRECATED RDW RBC AUTO: 36.1 FL (ref 40–54)
EOSINOPHIL # BLD AUTO: 0.12 10*3/MM3 (ref 0–0.7)
EOSINOPHIL NFR BLD AUTO: 1.8 % (ref 0–4)
ERYTHROCYTE [DISTWIDTH] IN BLOOD BY AUTOMATED COUNT: 13.6 % (ref 12–15)
GFR SERPL CREATININE-BSD FRML MDRD: 134 ML/MIN/1.73
GLOBULIN UR ELPH-MCNC: 2.6 GM/DL
GLUCOSE BLD-MCNC: 336 MG/DL (ref 70–100)
GLUCOSE UR STRIP-MCNC: ABNORMAL MG/DL
HBA1C MFR BLD: 10 %
HCT VFR BLD AUTO: 40.4 % (ref 40–52)
HGB BLD-MCNC: 14.2 G/DL (ref 14–18)
HGB UR QL STRIP.AUTO: NEGATIVE
HYALINE CASTS UR QL AUTO: ABNORMAL /LPF
IMM GRANULOCYTES # BLD: 0.07 10*3/MM3 (ref 0–0.03)
IMM GRANULOCYTES NFR BLD: 1 % (ref 0–5)
KETONES UR QL STRIP: NEGATIVE
LEUKOCYTE ESTERASE UR QL STRIP.AUTO: NEGATIVE
LYMPHOCYTES # BLD AUTO: 1.76 10*3/MM3 (ref 0.72–4.86)
LYMPHOCYTES NFR BLD AUTO: 26.3 % (ref 15–45)
MCH RBC QN AUTO: 26.5 PG (ref 28–32)
MCHC RBC AUTO-ENTMCNC: 35.1 G/DL (ref 33–36)
MCV RBC AUTO: 75.5 FL (ref 82–95)
MONOCYTES # BLD AUTO: 0.45 10*3/MM3 (ref 0.19–1.3)
MONOCYTES NFR BLD AUTO: 6.7 % (ref 4–12)
NEUTROPHILS # BLD AUTO: 4.23 10*3/MM3 (ref 1.87–8.4)
NEUTROPHILS NFR BLD AUTO: 63.2 % (ref 39–78)
NITRITE UR QL STRIP: NEGATIVE
NRBC BLD MANUAL-RTO: 0 /100 WBC (ref 0–0)
PH UR STRIP.AUTO: 6.5 [PH] (ref 5–8)
PLATELET # BLD AUTO: 188 10*3/MM3 (ref 130–400)
PMV BLD AUTO: 10.9 FL (ref 6–12)
POTASSIUM BLD-SCNC: 3.6 MMOL/L (ref 3.5–5.3)
PROT SERPL-MCNC: 6.9 G/DL (ref 6.3–8.7)
PROT UR QL STRIP: ABNORMAL
PSA SERPL-MCNC: 0.75 NG/ML (ref 0–4)
RBC # BLD AUTO: 5.35 10*6/MM3 (ref 4.8–5.9)
RBC # UR: ABNORMAL /HPF
REF LAB TEST METHOD: ABNORMAL
SODIUM BLD-SCNC: 133 MMOL/L (ref 135–145)
SP GR UR STRIP: 1.02 (ref 1–1.03)
SQUAMOUS #/AREA URNS HPF: ABNORMAL /HPF
UROBILINOGEN UR QL STRIP: ABNORMAL
WBC NRBC COR # BLD: 6.7 10*3/MM3 (ref 4.8–10.8)
WBC UR QL AUTO: ABNORMAL /HPF

## 2018-02-02 PROCEDURE — 80053 COMPREHEN METABOLIC PANEL: CPT | Performed by: PHYSICIAN ASSISTANT

## 2018-02-02 PROCEDURE — G0103 PSA SCREENING: HCPCS | Performed by: PHYSICIAN ASSISTANT

## 2018-02-02 PROCEDURE — 81001 URINALYSIS AUTO W/SCOPE: CPT | Performed by: PHYSICIAN ASSISTANT

## 2018-02-02 PROCEDURE — 83036 HEMOGLOBIN GLYCOSYLATED A1C: CPT | Performed by: PHYSICIAN ASSISTANT

## 2018-02-02 PROCEDURE — 36415 COLL VENOUS BLD VENIPUNCTURE: CPT

## 2018-02-02 PROCEDURE — 85025 COMPLETE CBC W/AUTO DIFF WBC: CPT | Performed by: PHYSICIAN ASSISTANT

## 2018-02-02 PROCEDURE — 82043 UR ALBUMIN QUANTITATIVE: CPT | Performed by: PHYSICIAN ASSISTANT

## 2018-02-02 PROCEDURE — 84153 ASSAY OF PSA TOTAL: CPT | Performed by: PHYSICIAN ASSISTANT

## 2018-02-04 LAB — MICROALBUMIN UR-MCNC: 320.1 UG/ML

## 2018-10-10 ENCOUNTER — HOSPITAL ENCOUNTER (EMERGENCY)
Facility: HOSPITAL | Age: 58
Discharge: HOME | End: 2018-10-10
Admitting: NURSE PRACTITIONER

## 2018-10-10 ENCOUNTER — APPOINTMENT (OUTPATIENT)
Dept: GENERAL RADIOLOGY | Facility: HOSPITAL | Age: 58
End: 2018-10-10

## 2018-10-10 ENCOUNTER — APPOINTMENT (OUTPATIENT)
Dept: CT IMAGING | Facility: HOSPITAL | Age: 58
End: 2018-10-10

## 2018-10-10 VITALS
RESPIRATION RATE: 22 BRPM | SYSTOLIC BLOOD PRESSURE: 138 MMHG | DIASTOLIC BLOOD PRESSURE: 89 MMHG | WEIGHT: 173.9 LBS | HEIGHT: 65 IN | OXYGEN SATURATION: 98 % | HEART RATE: 92 BPM | TEMPERATURE: 98 F | BODY MASS INDEX: 28.97 KG/M2

## 2018-10-10 DIAGNOSIS — S39.012A STRAIN OF LUMBAR REGION, INITIAL ENCOUNTER: ICD-10-CM
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
## 2018-10-10 LAB
ALBUMIN SERPL-MCNC: 3.8 G/DL (ref 3.5–5)
ALBUMIN/GLOB SERPL: 1.7 G/DL (ref 1.1–2.5)
ALP SERPL-CCNC: 103 U/L (ref 24–120)
ALT SERPL W P-5'-P-CCNC: 21 U/L (ref 0–54)
ANION GAP SERPL CALCULATED.3IONS-SCNC: 12 MMOL/L (ref 4–13)
AST SERPL-CCNC: 18 U/L (ref 7–45)
BASOPHILS # BLD AUTO: 0.1 10*3/MM3 (ref 0–0.2)
BASOPHILS NFR BLD AUTO: 1.2 % (ref 0–2)
BILIRUB SERPL-MCNC: 0.5 MG/DL (ref 0.1–1)
BUN BLD-MCNC: 14 MG/DL (ref 5–21)
BUN/CREAT SERPL: 19.2 (ref 7–25)
CALCIUM SPEC-SCNC: 9.1 MG/DL (ref 8.4–10.4)
CHLORIDE SERPL-SCNC: 89 MMOL/L (ref 98–110)
CO2 SERPL-SCNC: 31 MMOL/L (ref 24–31)
CREAT BLD-MCNC: 0.73 MG/DL (ref 0.5–1.4)
DEPRECATED RDW RBC AUTO: 38.9 FL (ref 40–54)
EOSINOPHIL # BLD AUTO: 0.11 10*3/MM3 (ref 0–0.7)
EOSINOPHIL NFR BLD AUTO: 1.4 % (ref 0–4)
ERYTHROCYTE [DISTWIDTH] IN BLOOD BY AUTOMATED COUNT: 14 % (ref 12–15)
GFR SERPL CREATININE-BSD FRML MDRD: 110 ML/MIN/1.73
GLOBULIN UR ELPH-MCNC: 2.3 GM/DL
GLUCOSE BLD-MCNC: 341 MG/DL (ref 70–100)
HCT VFR BLD AUTO: 38 % (ref 40–52)
HGB BLD-MCNC: 13.5 G/DL (ref 14–18)
IMM GRANULOCYTES # BLD: 0.16 10*3/MM3 (ref 0–0.03)
IMM GRANULOCYTES NFR BLD: 2 % (ref 0–5)
LYMPHOCYTES # BLD AUTO: 1.82 10*3/MM3 (ref 0.72–4.86)
LYMPHOCYTES NFR BLD AUTO: 22.4 % (ref 15–45)
MCH RBC QN AUTO: 27.8 PG (ref 28–32)
MCHC RBC AUTO-ENTMCNC: 35.5 G/DL (ref 33–36)
MCV RBC AUTO: 78.4 FL (ref 82–95)
MONOCYTES # BLD AUTO: 0.56 10*3/MM3 (ref 0.19–1.3)
MONOCYTES NFR BLD AUTO: 6.9 % (ref 4–12)
NEUTROPHILS # BLD AUTO: 5.37 10*3/MM3 (ref 1.87–8.4)
NEUTROPHILS NFR BLD AUTO: 66.1 % (ref 39–78)
NRBC BLD MANUAL-RTO: 0 /100 WBC (ref 0–0)
PLATELET # BLD AUTO: 190 10*3/MM3 (ref 130–400)
PMV BLD AUTO: 10.3 FL (ref 6–12)
POTASSIUM BLD-SCNC: 4.3 MMOL/L (ref 3.5–5.3)
PROT SERPL-MCNC: 6.1 G/DL (ref 6.3–8.7)
RBC # BLD AUTO: 4.85 10*6/MM3 (ref 4.8–5.9)
SODIUM BLD-SCNC: 132 MMOL/L (ref 135–145)
TROPONIN I SERPL-MCNC: <0.012 NG/ML (ref 0–0.03)
WBC NRBC COR # BLD: 8.12 10*3/MM3 (ref 4.8–10.8)

## 2018-10-10 PROCEDURE — 84484 ASSAY OF TROPONIN QUANT: CPT | Performed by: NURSE PRACTITIONER

## 2018-10-10 PROCEDURE — 80053 COMPREHEN METABOLIC PANEL: CPT | Performed by: NURSE PRACTITIONER

## 2018-10-10 PROCEDURE — 73502 X-RAY EXAM HIP UNI 2-3 VIEWS: CPT

## 2018-10-10 PROCEDURE — 71045 X-RAY EXAM CHEST 1 VIEW: CPT

## 2018-10-10 PROCEDURE — 85025 COMPLETE CBC W/AUTO DIFF WBC: CPT | Performed by: NURSE PRACTITIONER

## 2018-10-10 PROCEDURE — 93005 ELECTROCARDIOGRAM TRACING: CPT | Performed by: NURSE PRACTITIONER

## 2018-10-10 PROCEDURE — 72170 X-RAY EXAM OF PELVIS: CPT

## 2018-10-10 PROCEDURE — 99284 EMERGENCY DEPT VISIT MOD MDM: CPT

## 2018-10-10 PROCEDURE — 72131 CT LUMBAR SPINE W/O DYE: CPT

## 2018-10-10 PROCEDURE — 93010 ELECTROCARDIOGRAM REPORT: CPT | Performed by: INTERNAL MEDICINE

## 2018-10-10 PROCEDURE — 36415 COLL VENOUS BLD VENIPUNCTURE: CPT

## 2018-10-10 RX ORDER — LISINOPRIL 40 MG/1
40 TABLET ORAL DAILY
COMMUNITY

## 2018-10-10 RX ORDER — METHYLPREDNISOLONE 4 MG/1
TABLET ORAL
Qty: 21 TABLET | Refills: 0 | Status: ON HOLD | OUTPATIENT
Start: 2018-10-10 | End: 2019-01-01

## 2018-10-10 RX ORDER — CYCLOBENZAPRINE HCL 10 MG
10 TABLET ORAL 3 TIMES DAILY PRN
Qty: 20 TABLET | Refills: 0 | Status: ON HOLD | OUTPATIENT
Start: 2018-10-10 | End: 2019-01-01

## 2018-10-10 RX ORDER — DIAZEPAM 5 MG/1
5 TABLET ORAL 2 TIMES DAILY
Status: ON HOLD | COMMUNITY
End: 2019-01-01 | Stop reason: SDUPTHER

## 2018-10-10 RX ORDER — ACETAMINOPHEN AND CODEINE PHOSPHATE 300; 60 MG/1; MG/1
1 TABLET ORAL EVERY 8 HOURS PRN
Status: ON HOLD | COMMUNITY
End: 2019-01-01 | Stop reason: SDUPTHER

## 2018-10-10 RX ORDER — SODIUM CHLORIDE 0.9 % (FLUSH) 0.9 %
10 SYRINGE (ML) INJECTION AS NEEDED
Status: DISCONTINUED | OUTPATIENT
Start: 2018-10-10 | End: 2018-10-10 | Stop reason: HOSPADM

## 2018-10-10 NOTE — ED PROVIDER NOTES
"Subjective   Patient is a 58-year-old white male presents from home per EMS after having a fall yesterday.  He states \"my legs just went out from under me and I fell and hit my back on the ground.  He denies any head injury or loss consciousness.  He denies any chest pain or shortness of breath.  Patient states he is having pain to his lower back.  He denies any incontinence of urine or stool.  He denies any numbness or focal weakness.        History provided by:  Patient   used: No        Review of Systems   Constitutional: Negative.    HENT: Negative.    Eyes: Negative.    Respiratory: Negative.    Cardiovascular: Negative.    Gastrointestinal: Negative.    Endocrine: Negative.    Genitourinary: Negative.    Musculoskeletal:        Patient is a 58-year-old white male presents from home per EMS after having a fall yesterday.  He states \"my legs just went out from under me and I fell and hit my back on the ground.  He denies any head injury or loss consciousness.  He denies any chest pain or shortness of breath.  Patient states he is having pain to his lower back.  He denies any incontinence of urine or stool.  He denies any numbness or focal weakness.     Skin: Negative.    Allergic/Immunologic: Negative.    Neurological: Negative.    Hematological: Negative.    Psychiatric/Behavioral: Negative.    All other systems reviewed and are negative.      Past Medical History:   Diagnosis Date   • Back pain     current back pain per pt   • Chronic rheumatic arthritis (CMS/HCC)    • Diabetes mellitus (CMS/HCC)    • Hip dysplasia, congenital        No Known Allergies    Past Surgical History:   Procedure Laterality Date   • APPENDECTOMY     • JOINT REPLACEMENT Bilateral 2015    TKA   • TONSILLECTOMY         History reviewed. No pertinent family history.    Social History     Social History   • Marital status: Single     Social History Main Topics   • Smoking status: Unknown If Ever Smoked   • Drug use: " "Unknown     Other Topics Concern   • Not on file       Prior to Admission medications    Medication Sig Start Date End Date Taking? Authorizing Provider   acetaminophen-codeine (TYLENOL with CODEINE #4) 300-60 MG per tablet Take 1 tablet by mouth Every 4 (Four) Hours As Needed for Moderate Pain .   Yes Maxwell Butcher MD   diazePAM (VALIUM) 5 MG tablet Take 5 mg by mouth Every 12 (Twelve) Hours As Needed for Anxiety.   Yes Maxwell Butcher MD   lisinopril (PRINIVIL,ZESTRIL) 40 MG tablet Take 40 mg by mouth Daily.   Yes Maxwell Butcher MD       /89   Pulse 92   Temp 98 °F (36.7 °C)   Resp 22   Ht 165.1 cm (65\")   Wt 78.9 kg (173 lb 14.4 oz)   SpO2 98%   BMI 28.94 kg/m²     Objective   Physical Exam   Constitutional: He is oriented to person, place, and time. He appears well-developed and well-nourished.   HENT:   Head: Normocephalic and atraumatic.   Eyes: Pupils are equal, round, and reactive to light. Conjunctivae and EOM are normal.   Neck: Normal range of motion. Neck supple.   Cardiovascular: Normal rate, regular rhythm, normal heart sounds and intact distal pulses.    Pulmonary/Chest: Effort normal and breath sounds normal.   Abdominal: Soft. Bowel sounds are normal.   Musculoskeletal: Normal range of motion.   Tenderness on palpation of the paraspinal muscles of the lower lumbar spine.  There is no step-off or laxity noted.  SLR negative bilat. Foot push pull strong, equal. dtrs intact.    Neurological: He is alert and oriented to person, place, and time. He has normal reflexes.   Skin: Skin is warm and dry.   Psychiatric: He has a normal mood and affect. His behavior is normal. Judgment and thought content normal.   Nursing note and vitals reviewed.      Procedures         Lab Results (last 24 hours)     Procedure Component Value Units Date/Time    Comprehensive Metabolic Panel [[id number]]  (Abnormal) Collected:  10/10/18 1647    Specimen:  Blood Updated:  10/10/18 1706     " Glucose 341 (H) mg/dL      BUN 14 mg/dL      Creatinine 0.73 mg/dL      Sodium 132 (L) mmol/L      Potassium 4.3 mmol/L      Chloride 89 (L) mmol/L      CO2 31.0 mmol/L      Calcium 9.1 mg/dL      Total Protein 6.1 (L) g/dL      Albumin 3.80 g/dL      ALT (SGPT) 21 U/L      AST (SGOT) 18 U/L      Alkaline Phosphatase 103 U/L      Total Bilirubin 0.5 mg/dL      eGFR Non African Amer 110 mL/min/1.73      Globulin 2.3 gm/dL      A/G Ratio 1.7 g/dL      BUN/Creatinine Ratio 19.2     Anion Gap 12.0 mmol/L     CBC & Differential [[id number]] Collected:  10/10/18 1647    Specimen:  Blood Updated:  10/10/18 1655    Narrative:       The following orders were created for panel order CBC & Differential.  Procedure                               Abnormality         Status                     ---------                               -----------         ------                     CBC Auto Differential[[id number]]        Abnormal            Final result                 Please view results for these tests on the individual orders.    Troponin [[id number]]  (Normal) Collected:  10/10/18 1647    Specimen:  Blood Updated:  10/10/18 1717     Troponin I <0.012 ng/mL     CBC Auto Differential [[id number]]  (Abnormal) Collected:  10/10/18 1647    Specimen:  Blood Updated:  10/10/18 1655     WBC 8.12 10*3/mm3      RBC 4.85 10*6/mm3      Hemoglobin 13.5 (L) g/dL      Hematocrit 38.0 (L) %      MCV 78.4 (L) fL      MCH 27.8 (L) pg      MCHC 35.5 g/dL      RDW 14.0 %      RDW-SD 38.9 (L) fl      MPV 10.3 fL      Platelets 190 10*3/mm3      Neutrophil % 66.1 %      Lymphocyte % 22.4 %      Monocyte % 6.9 %      Eosinophil % 1.4 %      Basophil % 1.2 %      Immature Grans % 2.0 %      Neutrophils, Absolute 5.37 10*3/mm3      Lymphocytes, Absolute 1.82 10*3/mm3      Monocytes, Absolute 0.56 10*3/mm3      Eosinophils, Absolute 0.11 10*3/mm3      Basophils, Absolute 0.10 10*3/mm3      Immature Grans, Absolute 0.16 (H) 10*3/mm3      nRBC 0.0 /100 WBC            XR Hip With or Without Pelvis 2 - 3 View Left   Final Result      CT Lumbar Spine Without Contrast   ED Interpretation   1.  No acute osseous posttraumatic changes.           This report was finalized on 10/10/2018 18:57 by Dr. Yanelis Cruz MD.      Final Result   1.  No acute osseous posttraumatic changes.           This report was finalized on 10/10/2018 18:57 by Dr. Yanelis Cruz MD.      XR Chest 1 View   ED Interpretation   1. No radiographic evidence of acute cardiopulmonary process.           This report was finalized on 10/10/2018 17:37 by Dr. Jori Landa MD.      Final Result   1. No radiographic evidence of acute cardiopulmonary process.           This report was finalized on 10/10/2018 17:37 by Dr. Jori Landa MD.      XR Pelvis 1 or 2 View   Final Result          ED Course  ED Course as of Oct 11 0158   Wed Oct 10, 2018   1855 Pending ct scan result at this time. Pt updated on status  [CW]   1949 Reviewed results with pt. Advised pt of normal labs, xrays, and ct scan. Advised that he probably pulled the muscles in his back. Will prescribe medrol dose pack and pain medication. Advised pt to apply ice to area for 24 hours, then moist heat compresses three times a day for 15-20 min intervals  [CW]      ED Course User Index  [CW] Victorina Hanson, EDUARDA          MDM  Number of Diagnoses or Management Options  Fall, initial encounter: minor  Strain of lumbar region, initial encounter: minor     Amount and/or Complexity of Data Reviewed  Clinical lab tests: ordered and reviewed  Tests in the radiology section of CPT®: ordered and reviewed  Independent visualization of images, tracings, or specimens: yes    Patient Progress  Patient progress: stable      Final diagnoses:   Fall, initial encounter   Strain of lumbar region, initial encounter          Victorina Hanson, EDUARDA  10/11/18 0158

## 2018-10-11 NOTE — ED NOTES
Pt requesting something eat.  Explained pt can not have anything to eat or drink at this time.  No other concerns at this time.     Flaquito Briggs RN  10/10/18 2426

## 2018-10-11 NOTE — DISCHARGE INSTRUCTIONS
Return to ER if symptoms worsen   Ice to area for 24 hours, then moist heat compresses three times a day for 15-20 min intervals     Cryotherapy  WHAT IS CRYOTHERAPY?  Cryotherapy, or cold therapy, is a treatment that uses cold temperatures to treat an injury or medical condition. It includes using cold packs or ice packs to reduce pain and swelling. Only use cryotherapy if your doctor says it is okay.  HOW DO I USE CRYOTHERAPY?  · Place a towel between the cold source and your skin.  · Apply the cold source for no more than 20 minutes at a time.  · Check your skin after 5 minutes to make sure there are no signs of a poor response to cold or skin damage. Check for:  ? White spots on your skin. Your skin may look blotchy or mottled.  ? Skin that looks blue or pale.  ? Skin that feels waxy or hard.  · Repeat these steps as many times each day as told by your doctor.    HOW CAN I MAKE A COLD PACK?  When using a cold pack at home to reduce pain and swelling, you can use:  · A silica gel cold pack that has been left in the freezer. You can buy this online or in stores.  · A plastic bag of frozen vegetables.  · A sealable plastic bag that has been filled with crushed ice.    Always wrap the pack in a dry or damp towel to avoid direct contact with your skin.  WHEN SHOULD I CALL MY DOCTOR?  Call your doctor if:  · You start to have white spots on your skin. This may give your skin a blotchy or mottled look.  · Your skin turns blue or pale.  · Your skin becomes waxy or hard.  · Your swelling gets worse.    This information is not intended to replace advice given to you by your health care provider. Make sure you discuss any questions you have with your health care provider.  Document Released: 06/05/2009 Document Revised: 05/25/2017 Document Reviewed: 08/31/2016  [employer] Interactive Patient Education © 2017 [employer]      Cryotherapy  WHAT IS CRYOTHERAPY?  Cryotherapy, or cold therapy, is a treatment that uses cold  temperatures to treat an injury or medical condition. It includes using cold packs or ice packs to reduce pain and swelling.  WHO SHOULD NOT USE CRYOTHERAPY?  Cryotherapy is not safe for people who cannot tell you if they are in pain, such as small children and people who have dementia. Cryotherapy is also not safe for people with certain conditions, such as:  · Raynaud phenomenon.  · Cold hypersensitivity.  · Numbness or loss of feeling in the area being iced.    Cryotherapy may or may not be safe for people with certain other conditions. Do not use cryotherapy without your health care provider's approval if you have:  · A heart condition.  · High blood pressure.  · Open or healing wounds.  · An infection.  · Rheumatoid arthritis.  · Poor circulation.  · Diabetes.  · Certain skin conditions.    HOW DO I USE CRYOTHERAPY?  To use cryotherapy at home to reduce pain and swelling:  · Place a towel between the cold source and your skin.  · Apply the cold source for no more than 20 minutes at a time.  · Check your skin after 5 minutes to make sure there are no signs of a poor response to cold or skin damage. Check for:  ? White spots on your skin. Your skin may look blotchy or mottled.  ? Skin that looks blue or pale.  ? Skin that feels waxy or hard.  · Repeat these steps as many times each day as told by your health care provider.    HOW CAN I MAKE A COLD PACK?  When using a cold pack at home to reduce pain and swelling, you can use:  · A silica gel cold pack that has been left in the freezer. You can buy this online or in stores.  · A plastic bag of frozen vegetables.  · A sealable plastic bag that has been filled with crushed ice.    Always wrap the pack in a dry or damp towel to avoid direct contact with your skin.  WHEN SHOULD I CALL MY HEALTH CARE PROVIDER?  Call your health care provider if:  · You develop white spots on your skin. This may give your skin a blotchy or mottled look.  · Your skin turns blue or  pale.  · Your skin becomes waxy or hard.  · Your swelling gets worse.    This information is not intended to replace advice given to you by your health care provider. Make sure you discuss any questions you have with your health care provider.  Document Released: 08/13/2012 Document Revised: 05/25/2017 Document Reviewed: 08/31/2016  [employer] Interactive Patient Education © 2017 [employer]      Lumbosacral Strain  Lumbosacral strain is an injury that causes pain in the lower back (lumbosacral spine). This injury usually occurs from overstretching the muscles or ligaments along your spine. A strain can affect one or more muscles or cord-like tissues that connect bones to other bones (ligaments).  What are the causes?  This condition may be caused by:  · A hard, direct hit (blow) to the back.  · Excessive stretching of the lower back muscles. This may result from:  ? A fall.  ? Lifting something heavy.  ? Repetitive movements such as bending or crouching.    What increases the risk?  The following factors may increase your risk of getting this condition:  · Participating in sports or activities that involve:  ? A sudden twist of the back.  ? Pushing or pulling motions.  · Being overweight or obese.  · Having poor strength and flexibility, especially tight hamstrings or weak muscles in the back or abdomen.  · Having too much of a curve in the lower back.  · Having a pelvis that is tilted forward.    What are the signs or symptoms?  The main symptom of this condition is pain in the lower back, at the site of the strain. Pain may extend (radiate) down one or both legs.  How is this diagnosed?  This condition is diagnosed based on:  · Your symptoms.  · Your medical history.  · A physical exam.  ? Your health care provider may push on certain areas of your back to determine the source of your pain.  ? You may be asked to bend forward, backward, and side to side to assess the severity of your pain and your range of  motion.  · Imaging tests, such as:  ? X-rays.  ? MRI.    How is this treated?  Treatment for this condition may include:  · Putting heat and cold on the affected area.  · Medicines to help relieve pain and relax your muscles (muscle relaxants).  · NSAIDs to help reduce swelling and discomfort.    When your symptoms improve, it is important to gradually return to your normal routine as soon as possible to reduce pain, avoid stiffness, and avoid loss of muscle strength. Generally, symptoms should improve within 6 weeks of treatment. However, recovery time varies.  Follow these instructions at home:  Managing pain, stiffness, and swelling    · If directed, put ice on the injured area during the first 24 hours after your strain.  ? Put ice in a plastic bag.  ? Place a towel between your skin and the bag.  ? Leave the ice on for 20 minutes, 2-3 times a day.  · If directed, put heat on the affected area as often as told by your health care provider. Use the heat source that your health care provider recommends, such as a moist heat pack or a heating pad.  ? Place a towel between your skin and the heat source.  ? Leave the heat on for 20-30 minutes.  ? Remove the heat if your skin turns bright red. This is especially important if you are unable to feel pain, heat, or cold. You may have a greater risk of getting burned.  Activity  · Rest and return to your normal activities as told by your health care provider. Ask your health care provider what activities are safe for you.  · Avoid activities that take a lot of energy for as long as told by your health care provider.  General instructions  · Take over-the-counter and prescription medicines only as told by your health care provider.  · Do not drive or use heavy machinery while taking prescription pain medicine.  · Do not use any products that contain nicotine or tobacco, such as cigarettes and e-cigarettes. If you need help quitting, ask your health care provider.  · Keep  all follow-up visits as told by your health care provider. This is important.  How is this prevented?  · Use correct form when playing sports and lifting heavy objects.  · Use good posture when sitting and standing.  · Maintain a healthy weight.  · Sleep on a mattress with medium firmness to support your back.  · Be safe and responsible while being active to avoid falls.  · Do at least 150 minutes of moderate-intensity exercise each week, such as brisk walking or water aerobics. Try a form of exercise that takes stress off your back, such as swimming or stationary cycling.  · Maintain physical fitness, including:  ? Strength.  ? Flexibility.  ? Cardiovascular fitness.  ? Endurance.  Contact a health care provider if:  · Your back pain does not improve after 6 weeks of treatment.  · Your symptoms get worse.  Get help right away if:  · Your back pain is severe.  · You cannot stand or walk.  · You have difficulty controlling when you urinate or when you have a bowel movement.  · You feel nauseous or you vomit.  · Your feet get very cold.  · You have numbness, tingling, weakness, or problems using your arms or legs.  · You develop any of the following:  ? Shortness of breath.  ? Dizziness.  ? Pain in your legs.  ? Weakness in your buttocks or legs.  ? Discoloration of the skin on your toes or legs.  This information is not intended to replace advice given to you by your health care provider. Make sure you discuss any questions you have with your health care provider.  Document Released: 09/27/2006 Document Revised: 07/07/2017 Document Reviewed: 05/21/2017  [employer] Interactive Patient Education © 2018 [employer]

## 2018-10-11 NOTE — ED NOTES
Discharge Planning Assessment  [facility]     Patient Name: Vitaly Pantoja  MRN: [id number]  Today's Date: 10/10/2018    Admit Date: 10/10/2018          Discharge Needs Assessment    No documentation.             Discharge Plan     Row Name 10/10/18 2133       Plan    Plan Provided cab voucher for pt.  No other way home.  Told him one time only.  Entered his name on excel sheet. Rachel Fair Rn [facility]    Patient/Family in Agreement with Plan yes        Destination     No service coordination in this encounter.      Durable Medical Equipment     No service coordination in this encounter.      Dialysis/Infusion     No service coordination in this encounter.      Home Medical Care     No service coordination in this encounter.      Social Care     No service coordination in this encounter.                Demographic Summary    No documentation.           Functional Status    No documentation.           Psychosocial    No documentation.           Abuse/Neglect    No documentation.           Legal    No documentation.           Substance Abuse    No documentation.           Patient Forms    No documentation.         Rachel Fair, MELINA

## 2019-01-01 ENCOUNTER — APPOINTMENT (OUTPATIENT)
Dept: GENERAL RADIOLOGY | Facility: HOSPITAL | Age: 59
End: 2019-01-01

## 2019-01-01 ENCOUNTER — APPOINTMENT (OUTPATIENT)
Dept: ULTRASOUND IMAGING | Facility: HOSPITAL | Age: 59
End: 2019-01-01

## 2019-01-01 ENCOUNTER — HOSPITAL ENCOUNTER (INPATIENT)
Facility: HOSPITAL | Age: 59
LOS: 9 days | Discharge: NURSING, REHAB OR LONG-TERM CARE | End: 2019-04-10
Attending: INTERNAL MEDICINE | Admitting: INTERNAL MEDICINE

## 2019-01-01 ENCOUNTER — OUTSIDE FACILITY SERVICE (OUTPATIENT)
Dept: CARDIOLOGY | Facility: CLINIC | Age: 59
End: 2019-01-01

## 2019-01-01 ENCOUNTER — APPOINTMENT (OUTPATIENT)
Dept: CARDIOLOGY | Facility: HOSPITAL | Age: 59
End: 2019-01-01

## 2019-01-01 VITALS
HEIGHT: 65 IN | BODY MASS INDEX: 29.2 KG/M2 | DIASTOLIC BLOOD PRESSURE: 65 MMHG | RESPIRATION RATE: 16 BRPM | HEART RATE: 73 BPM | WEIGHT: 175.27 LBS | OXYGEN SATURATION: 100 % | SYSTOLIC BLOOD PRESSURE: 148 MMHG | TEMPERATURE: 99.5 F

## 2019-01-01 DIAGNOSIS — Z74.09 IMPAIRED MOBILITY: ICD-10-CM

## 2019-01-01 DIAGNOSIS — I46.9 CARDIAC ARREST (HCC): ICD-10-CM

## 2019-01-01 DIAGNOSIS — R13.12 OROPHARYNGEAL DYSPHAGIA: ICD-10-CM

## 2019-01-01 DIAGNOSIS — Z78.9 DECREASED ACTIVITIES OF DAILY LIVING (ADL): ICD-10-CM

## 2019-01-01 DIAGNOSIS — A41.9 SHOCK, SEPTIC (HCC): Primary | ICD-10-CM

## 2019-01-01 DIAGNOSIS — R65.21 SHOCK, SEPTIC (HCC): Primary | ICD-10-CM

## 2019-01-01 LAB
ABO GROUP BLD: NORMAL
ALBUMIN SERPL-MCNC: 2.4 G/DL (ref 3.5–5)
ALBUMIN SERPL-MCNC: 2.5 G/DL (ref 3.5–5)
ALBUMIN SERPL-MCNC: 2.6 G/DL (ref 3.5–5)
ALBUMIN SERPL-MCNC: 2.8 G/DL (ref 3.5–5)
ALBUMIN SERPL-MCNC: 2.9 G/DL (ref 3.5–5)
ALBUMIN SERPL-MCNC: 3 G/DL (ref 3.5–5)
ALBUMIN/GLOB SERPL: 0.8 G/DL (ref 1.1–2.5)
ALBUMIN/GLOB SERPL: 0.8 G/DL (ref 1.1–2.5)
ALBUMIN/GLOB SERPL: 0.9 G/DL (ref 1.1–2.5)
ALBUMIN/GLOB SERPL: 1 G/DL (ref 1.1–2.5)
ALBUMIN/GLOB SERPL: 1.1 G/DL (ref 1.1–2.5)
ALBUMIN/GLOB SERPL: 1.1 G/DL (ref 1.1–2.5)
ALP SERPL-CCNC: 121 U/L (ref 24–120)
ALP SERPL-CCNC: 149 U/L (ref 24–120)
ALP SERPL-CCNC: 168 U/L (ref 24–120)
ALP SERPL-CCNC: 223 U/L (ref 24–120)
ALP SERPL-CCNC: 83 U/L (ref 24–120)
ALP SERPL-CCNC: 91 U/L (ref 24–120)
ALT SERPL W P-5'-P-CCNC: 26 U/L (ref 0–54)
ALT SERPL W P-5'-P-CCNC: 39 U/L (ref 0–54)
ALT SERPL W P-5'-P-CCNC: 56 U/L (ref 0–54)
ALT SERPL W P-5'-P-CCNC: 67 U/L (ref 0–54)
ALT SERPL W P-5'-P-CCNC: <15 U/L (ref 0–54)
ALT SERPL W P-5'-P-CCNC: <15 U/L (ref 0–54)
AMORPH URATE CRY URNS QL MICRO: ABNORMAL /HPF
AMPHET+METHAMPHET UR QL: NEGATIVE
ANION GAP SERPL CALCULATED.3IONS-SCNC: 11 MMOL/L (ref 4–13)
ANION GAP SERPL CALCULATED.3IONS-SCNC: 12 MMOL/L (ref 4–13)
ANION GAP SERPL CALCULATED.3IONS-SCNC: 12 MMOL/L (ref 4–13)
ANION GAP SERPL CALCULATED.3IONS-SCNC: 19 MMOL/L (ref 4–13)
ANION GAP SERPL CALCULATED.3IONS-SCNC: 6 MMOL/L (ref 4–13)
ANION GAP SERPL CALCULATED.3IONS-SCNC: 9 MMOL/L (ref 4–13)
ANION GAP SERPL CALCULATED.3IONS-SCNC: 9 MMOL/L (ref 4–13)
ANISOCYTOSIS BLD QL: ABNORMAL
ANISOCYTOSIS BLD QL: ABNORMAL
ARTERIAL PATENCY WRIST A: ABNORMAL
AST SERPL-CCNC: 158 U/L (ref 7–45)
AST SERPL-CCNC: 19 U/L (ref 7–45)
AST SERPL-CCNC: 20 U/L (ref 7–45)
AST SERPL-CCNC: 27 U/L (ref 7–45)
AST SERPL-CCNC: 42 U/L (ref 7–45)
AST SERPL-CCNC: 43 U/L (ref 7–45)
ATMOSPHERIC PRESS: 754 MMHG
ATMOSPHERIC PRESS: 755 MMHG
ATMOSPHERIC PRESS: 755 MMHG
ATMOSPHERIC PRESS: 756 MMHG
BACTERIA SPEC AEROBE CULT: NORMAL
BACTERIA SPEC RESP CULT: ABNORMAL
BACTERIA SPEC RESP CULT: ABNORMAL
BACTERIA UR QL AUTO: ABNORMAL /HPF
BACTERIA UR QL AUTO: ABNORMAL /HPF
BARBITURATES UR QL SCN: NEGATIVE
BASE EXCESS BLDA CALC-SCNC: -11.3 MMOL/L (ref 0–2)
BASE EXCESS BLDA CALC-SCNC: -6.2 MMOL/L (ref 0–2)
BASE EXCESS BLDA CALC-SCNC: 4.5 MMOL/L (ref 0–2)
BASE EXCESS BLDA CALC-SCNC: 8.2 MMOL/L (ref 0–2)
BASOPHILS # BLD AUTO: 0.08 10*3/MM3 (ref 0–0.2)
BASOPHILS NFR BLD AUTO: 0.6 % (ref 0–2)
BDY SITE: ABNORMAL
BENZODIAZ UR QL SCN: POSITIVE
BH CV ECHO MEAS - AO MAX PG (FULL): 5.3 MMHG
BH CV ECHO MEAS - AO MAX PG: 8.8 MMHG
BH CV ECHO MEAS - AO MEAN PG (FULL): 2 MMHG
BH CV ECHO MEAS - AO MEAN PG: 3 MMHG
BH CV ECHO MEAS - AO ROOT AREA (BSA CORRECTED): 1.6
BH CV ECHO MEAS - AO ROOT AREA: 7.1 CM^2
BH CV ECHO MEAS - AO ROOT DIAM: 3 CM
BH CV ECHO MEAS - AO V2 MAX: 148 CM/SEC
BH CV ECHO MEAS - AO V2 MEAN: 79.4 CM/SEC
BH CV ECHO MEAS - AO V2 VTI: 18 CM
BH CV ECHO MEAS - AVA(I,A): 2 CM^2
BH CV ECHO MEAS - AVA(I,D): 2 CM^2
BH CV ECHO MEAS - AVA(V,A): 2 CM^2
BH CV ECHO MEAS - AVA(V,D): 2 CM^2
BH CV ECHO MEAS - BSA(HAYCOCK): 1.9 M^2
BH CV ECHO MEAS - BSA: 1.9 M^2
BH CV ECHO MEAS - BZI_BMI: 29.1 KILOGRAMS/M^2
BH CV ECHO MEAS - BZI_METRIC_HEIGHT: 165.1 CM
BH CV ECHO MEAS - BZI_METRIC_WEIGHT: 79.4 KG
BH CV ECHO MEAS - EDV(CUBED): 79 ML
BH CV ECHO MEAS - EDV(MOD-SP4): 93.3 ML
BH CV ECHO MEAS - EDV(TEICH): 82.6 ML
BH CV ECHO MEAS - EF(CUBED): 51.5 %
BH CV ECHO MEAS - EF(MOD-SP4): 45 %
BH CV ECHO MEAS - EF(TEICH): 43.8 %
BH CV ECHO MEAS - ESV(CUBED): 38.3 ML
BH CV ECHO MEAS - ESV(MOD-SP4): 51.3 ML
BH CV ECHO MEAS - ESV(TEICH): 46.4 ML
BH CV ECHO MEAS - FS: 21.4 %
BH CV ECHO MEAS - IVS/LVPW: 0.85
BH CV ECHO MEAS - IVSD: 0.61 CM
BH CV ECHO MEAS - LA DIMENSION: 3.9 CM
BH CV ECHO MEAS - LA/AO: 1.3
BH CV ECHO MEAS - LAT PEAK E' VEL: 12.5 CM/SEC
BH CV ECHO MEAS - LV DIASTOLIC VOL/BSA (35-75): 49.9 ML/M^2
BH CV ECHO MEAS - LV MASS(C)D: 81.7 GRAMS
BH CV ECHO MEAS - LV MASS(C)DI: 43.7 GRAMS/M^2
BH CV ECHO MEAS - LV MAX PG: 3.5 MMHG
BH CV ECHO MEAS - LV MEAN PG: 1 MMHG
BH CV ECHO MEAS - LV SYSTOLIC VOL/BSA (12-30): 27.4 ML/M^2
BH CV ECHO MEAS - LV V1 MAX: 93.1 CM/SEC
BH CV ECHO MEAS - LV V1 MEAN: 52.7 CM/SEC
BH CV ECHO MEAS - LV V1 VTI: 11.6 CM
BH CV ECHO MEAS - LVIDD: 4.3 CM
BH CV ECHO MEAS - LVIDS: 3.4 CM
BH CV ECHO MEAS - LVLD AP4: 8.4 CM
BH CV ECHO MEAS - LVLS AP4: 6.6 CM
BH CV ECHO MEAS - LVOT AREA (M): 3.1 CM^2
BH CV ECHO MEAS - LVOT AREA: 3.1 CM^2
BH CV ECHO MEAS - LVOT DIAM: 2 CM
BH CV ECHO MEAS - LVPWD: 0.71 CM
BH CV ECHO MEAS - MED PEAK E' VEL: 8.38 CM/SEC
BH CV ECHO MEAS - MV DEC TIME: 0.21 SEC
BH CV ECHO MEAS - MV E MAX VEL: 128 CM/SEC
BH CV ECHO MEAS - RAP SYSTOLE: 5 MMHG
BH CV ECHO MEAS - RVSP: 39.3 MMHG
BH CV ECHO MEAS - SI(AO): 68.1 ML/M^2
BH CV ECHO MEAS - SI(CUBED): 21.8 ML/M^2
BH CV ECHO MEAS - SI(LVOT): 19.5 ML/M^2
BH CV ECHO MEAS - SI(MOD-SP4): 22.5 ML/M^2
BH CV ECHO MEAS - SI(TEICH): 19.4 ML/M^2
BH CV ECHO MEAS - SV(AO): 127.2 ML
BH CV ECHO MEAS - SV(CUBED): 40.7 ML
BH CV ECHO MEAS - SV(LVOT): 36.4 ML
BH CV ECHO MEAS - SV(MOD-SP4): 42 ML
BH CV ECHO MEAS - SV(TEICH): 36.2 ML
BH CV ECHO MEAS - TR MAX VEL: 293 CM/SEC
BH CV ECHO MEASUREMENTS AVERAGE E/E' RATIO: 12.26
BILIRUB SERPL-MCNC: 0.5 MG/DL (ref 0.1–1)
BILIRUB SERPL-MCNC: 0.6 MG/DL (ref 0.1–1)
BILIRUB SERPL-MCNC: 0.6 MG/DL (ref 0.1–1)
BILIRUB SERPL-MCNC: 0.8 MG/DL (ref 0.1–1)
BILIRUB SERPL-MCNC: 1.1 MG/DL (ref 0.1–1)
BILIRUB SERPL-MCNC: 1.2 MG/DL (ref 0.1–1)
BILIRUB UR QL STRIP: ABNORMAL
BILIRUB UR QL STRIP: NEGATIVE
BLD GP AB SCN SERPL QL: NEGATIVE
BODY TEMPERATURE: 37 C
BUN BLD-MCNC: 11 MG/DL (ref 5–21)
BUN BLD-MCNC: 13 MG/DL (ref 5–21)
BUN BLD-MCNC: 17 MG/DL (ref 5–21)
BUN BLD-MCNC: 19 MG/DL (ref 5–21)
BUN BLD-MCNC: 49 MG/DL (ref 5–21)
BUN BLD-MCNC: 59 MG/DL (ref 5–21)
BUN BLD-MCNC: 60 MG/DL (ref 5–21)
BUN BLD-MCNC: 7 MG/DL (ref 5–21)
BUN BLD-MCNC: 8 MG/DL (ref 5–21)
BUN/CREAT SERPL: 10.9 (ref 7–25)
BUN/CREAT SERPL: 12.1 (ref 7–25)
BUN/CREAT SERPL: 16.9 (ref 7–25)
BUN/CREAT SERPL: 23.2 (ref 7–25)
BUN/CREAT SERPL: 24.5 (ref 7–25)
BUN/CREAT SERPL: 25.4 (ref 7–25)
BUN/CREAT SERPL: 25.4 (ref 7–25)
BUN/CREAT SERPL: 33.9 (ref 7–25)
BUN/CREAT SERPL: 37.7 (ref 7–25)
BURR CELLS BLD QL SMEAR: ABNORMAL
C DIFF TOX GENS STL QL NAA+PROBE: NEGATIVE
CALCIUM SPEC-SCNC: 7 MG/DL (ref 8.4–10.4)
CALCIUM SPEC-SCNC: 7 MG/DL (ref 8.4–10.4)
CALCIUM SPEC-SCNC: 7.2 MG/DL (ref 8.4–10.4)
CALCIUM SPEC-SCNC: 7.4 MG/DL (ref 8.4–10.4)
CALCIUM SPEC-SCNC: 7.4 MG/DL (ref 8.4–10.4)
CALCIUM SPEC-SCNC: 7.7 MG/DL (ref 8.4–10.4)
CALCIUM SPEC-SCNC: 7.8 MG/DL (ref 8.4–10.4)
CANNABINOIDS SERPL QL: NEGATIVE
CHLORIDE SERPL-SCNC: 101 MMOL/L (ref 98–110)
CHLORIDE SERPL-SCNC: 95 MMOL/L (ref 98–110)
CHLORIDE SERPL-SCNC: 96 MMOL/L (ref 98–110)
CHLORIDE SERPL-SCNC: 96 MMOL/L (ref 98–110)
CHLORIDE SERPL-SCNC: 97 MMOL/L (ref 98–110)
CHLORIDE SERPL-SCNC: 97 MMOL/L (ref 98–110)
CHLORIDE SERPL-SCNC: 98 MMOL/L (ref 98–110)
CLARITY UR: ABNORMAL
CLARITY UR: CLEAR
CLUMPED PLATELETS: PRESENT
CO2 SERPL-SCNC: 15 MMOL/L (ref 24–31)
CO2 SERPL-SCNC: 20 MMOL/L (ref 24–31)
CO2 SERPL-SCNC: 27 MMOL/L (ref 24–31)
CO2 SERPL-SCNC: 27 MMOL/L (ref 24–31)
CO2 SERPL-SCNC: 28 MMOL/L (ref 24–31)
CO2 SERPL-SCNC: 31 MMOL/L (ref 24–31)
CO2 SERPL-SCNC: 34 MMOL/L (ref 24–31)
CO2 SERPL-SCNC: 34 MMOL/L (ref 24–31)
CO2 SERPL-SCNC: 36 MMOL/L (ref 24–31)
COCAINE UR QL: NEGATIVE
COLOR UR: ABNORMAL
COLOR UR: YELLOW
CREAT BLD-MCNC: 0.56 MG/DL (ref 0.5–1.4)
CREAT BLD-MCNC: 0.56 MG/DL (ref 0.5–1.4)
CREAT BLD-MCNC: 0.64 MG/DL (ref 0.5–1.4)
CREAT BLD-MCNC: 0.65 MG/DL (ref 0.5–1.4)
CREAT BLD-MCNC: 0.66 MG/DL (ref 0.5–1.4)
CREAT BLD-MCNC: 0.67 MG/DL (ref 0.5–1.4)
CREAT BLD-MCNC: 1.3 MG/DL (ref 0.5–1.4)
CREAT BLD-MCNC: 2.32 MG/DL (ref 0.5–1.4)
CREAT BLD-MCNC: 2.45 MG/DL (ref 0.5–1.4)
D-LACTATE SERPL-SCNC: 1.8 MMOL/L (ref 0.5–2)
D-LACTATE SERPL-SCNC: 2.3 MMOL/L (ref 0.5–2)
D-LACTATE SERPL-SCNC: 4 MMOL/L (ref 0.5–2)
DEPRECATED RDW RBC AUTO: 39.6 FL (ref 40–54)
DEPRECATED RDW RBC AUTO: 40.1 FL (ref 40–54)
DEPRECATED RDW RBC AUTO: 41 FL (ref 40–54)
DEPRECATED RDW RBC AUTO: 41.1 FL (ref 40–54)
DEPRECATED RDW RBC AUTO: 41.1 FL (ref 40–54)
DEPRECATED RDW RBC AUTO: 41.2 FL (ref 40–54)
DEPRECATED RDW RBC AUTO: 41.5 FL (ref 40–54)
DEPRECATED RDW RBC AUTO: 42 FL (ref 40–54)
DEPRECATED RDW RBC AUTO: 42.5 FL (ref 40–54)
EOSINOPHIL # BLD AUTO: 0.24 10*3/MM3 (ref 0–0.7)
EOSINOPHIL NFR BLD AUTO: 1.7 % (ref 0–4)
ERYTHROCYTE [DISTWIDTH] IN BLOOD BY AUTOMATED COUNT: 15 % (ref 12–15)
ERYTHROCYTE [DISTWIDTH] IN BLOOD BY AUTOMATED COUNT: 15.1 % (ref 12–15)
ERYTHROCYTE [DISTWIDTH] IN BLOOD BY AUTOMATED COUNT: 15.1 % (ref 12–15)
ERYTHROCYTE [DISTWIDTH] IN BLOOD BY AUTOMATED COUNT: 15.2 % (ref 12–15)
ERYTHROCYTE [DISTWIDTH] IN BLOOD BY AUTOMATED COUNT: 15.2 % (ref 12–15)
ERYTHROCYTE [DISTWIDTH] IN BLOOD BY AUTOMATED COUNT: 15.3 % (ref 12–15)
ERYTHROCYTE [DISTWIDTH] IN BLOOD BY AUTOMATED COUNT: 15.3 % (ref 12–15)
ERYTHROCYTE [DISTWIDTH] IN BLOOD BY AUTOMATED COUNT: 15.4 % (ref 12–15)
ERYTHROCYTE [DISTWIDTH] IN BLOOD BY AUTOMATED COUNT: 15.6 % (ref 12–15)
FLUAV AG NPH QL: NEGATIVE
FLUBV AG NPH QL IA: NEGATIVE
GAS FLOW AIRWAY: 4 LPM
GFR SERPL CREATININE-BSD FRML MDRD: 121 ML/MIN/1.73
GFR SERPL CREATININE-BSD FRML MDRD: 124 ML/MIN/1.73
GFR SERPL CREATININE-BSD FRML MDRD: 126 ML/MIN/1.73
GFR SERPL CREATININE-BSD FRML MDRD: 128 ML/MIN/1.73
GFR SERPL CREATININE-BSD FRML MDRD: 149 ML/MIN/1.73
GFR SERPL CREATININE-BSD FRML MDRD: 149 ML/MIN/1.73
GFR SERPL CREATININE-BSD FRML MDRD: 27 ML/MIN/1.73
GFR SERPL CREATININE-BSD FRML MDRD: 29 ML/MIN/1.73
GFR SERPL CREATININE-BSD FRML MDRD: 57 ML/MIN/1.73
GIANT PLATELETS: ABNORMAL
GLOBULIN UR ELPH-MCNC: 2.2 GM/DL
GLOBULIN UR ELPH-MCNC: 2.8 GM/DL
GLOBULIN UR ELPH-MCNC: 2.8 GM/DL
GLOBULIN UR ELPH-MCNC: 3 GM/DL
GLOBULIN UR ELPH-MCNC: 3.1 GM/DL
GLOBULIN UR ELPH-MCNC: 3.3 GM/DL
GLUCOSE BLD-MCNC: 128 MG/DL (ref 70–100)
GLUCOSE BLD-MCNC: 145 MG/DL (ref 70–100)
GLUCOSE BLD-MCNC: 160 MG/DL (ref 70–100)
GLUCOSE BLD-MCNC: 168 MG/DL (ref 70–100)
GLUCOSE BLD-MCNC: 177 MG/DL (ref 70–100)
GLUCOSE BLD-MCNC: 203 MG/DL (ref 70–100)
GLUCOSE BLD-MCNC: 213 MG/DL (ref 70–100)
GLUCOSE BLD-MCNC: 235 MG/DL (ref 70–100)
GLUCOSE BLD-MCNC: 402 MG/DL (ref 70–100)
GLUCOSE BLDC GLUCOMTR-MCNC: 117 MG/DL (ref 70–130)
GLUCOSE BLDC GLUCOMTR-MCNC: 141 MG/DL (ref 70–130)
GLUCOSE BLDC GLUCOMTR-MCNC: 146 MG/DL (ref 70–130)
GLUCOSE BLDC GLUCOMTR-MCNC: 146 MG/DL (ref 70–130)
GLUCOSE BLDC GLUCOMTR-MCNC: 149 MG/DL (ref 70–130)
GLUCOSE BLDC GLUCOMTR-MCNC: 151 MG/DL (ref 70–130)
GLUCOSE BLDC GLUCOMTR-MCNC: 151 MG/DL (ref 70–130)
GLUCOSE BLDC GLUCOMTR-MCNC: 152 MG/DL (ref 70–130)
GLUCOSE BLDC GLUCOMTR-MCNC: 156 MG/DL (ref 70–130)
GLUCOSE BLDC GLUCOMTR-MCNC: 160 MG/DL (ref 70–130)
GLUCOSE BLDC GLUCOMTR-MCNC: 163 MG/DL (ref 70–130)
GLUCOSE BLDC GLUCOMTR-MCNC: 167 MG/DL (ref 70–130)
GLUCOSE BLDC GLUCOMTR-MCNC: 170 MG/DL (ref 70–130)
GLUCOSE BLDC GLUCOMTR-MCNC: 174 MG/DL (ref 70–130)
GLUCOSE BLDC GLUCOMTR-MCNC: 179 MG/DL (ref 70–130)
GLUCOSE BLDC GLUCOMTR-MCNC: 182 MG/DL (ref 70–130)
GLUCOSE BLDC GLUCOMTR-MCNC: 183 MG/DL (ref 70–130)
GLUCOSE BLDC GLUCOMTR-MCNC: 184 MG/DL (ref 70–130)
GLUCOSE BLDC GLUCOMTR-MCNC: 193 MG/DL (ref 70–130)
GLUCOSE BLDC GLUCOMTR-MCNC: 200 MG/DL (ref 70–130)
GLUCOSE BLDC GLUCOMTR-MCNC: 200 MG/DL (ref 70–130)
GLUCOSE BLDC GLUCOMTR-MCNC: 204 MG/DL (ref 70–130)
GLUCOSE BLDC GLUCOMTR-MCNC: 204 MG/DL (ref 70–130)
GLUCOSE BLDC GLUCOMTR-MCNC: 215 MG/DL (ref 70–130)
GLUCOSE BLDC GLUCOMTR-MCNC: 216 MG/DL (ref 70–130)
GLUCOSE BLDC GLUCOMTR-MCNC: 230 MG/DL (ref 70–130)
GLUCOSE BLDC GLUCOMTR-MCNC: 234 MG/DL (ref 70–130)
GLUCOSE BLDC GLUCOMTR-MCNC: 237 MG/DL (ref 70–130)
GLUCOSE BLDC GLUCOMTR-MCNC: 255 MG/DL (ref 70–130)
GLUCOSE BLDC GLUCOMTR-MCNC: 262 MG/DL (ref 70–130)
GLUCOSE BLDC GLUCOMTR-MCNC: 353 MG/DL (ref 70–130)
GLUCOSE BLDC GLUCOMTR-MCNC: 356 MG/DL (ref 70–130)
GLUCOSE BLDC GLUCOMTR-MCNC: 395 MG/DL (ref 70–130)
GLUCOSE BLDC GLUCOMTR-MCNC: 405 MG/DL (ref 70–130)
GLUCOSE UR STRIP-MCNC: ABNORMAL MG/DL
GLUCOSE UR STRIP-MCNC: ABNORMAL MG/DL
GRAM STN SPEC: ABNORMAL
HBA1C MFR BLD: 9.1 %
HCO3 BLDA-SCNC: 14.7 MMOL/L (ref 20–26)
HCO3 BLDA-SCNC: 20 MMOL/L (ref 20–26)
HCO3 BLDA-SCNC: 29.2 MMOL/L (ref 20–26)
HCO3 BLDA-SCNC: 31.6 MMOL/L (ref 20–26)
HCT VFR BLD AUTO: 30.1 % (ref 40–52)
HCT VFR BLD AUTO: 30.8 % (ref 40–52)
HCT VFR BLD AUTO: 31 % (ref 40–52)
HCT VFR BLD AUTO: 31 % (ref 40–52)
HCT VFR BLD AUTO: 32.5 % (ref 40–52)
HCT VFR BLD AUTO: 32.7 % (ref 40–52)
HCT VFR BLD AUTO: 33.9 % (ref 40–52)
HCT VFR BLD AUTO: 34.6 % (ref 40–52)
HCT VFR BLD AUTO: 35 % (ref 40–52)
HGB BLD-MCNC: 10.1 G/DL (ref 14–18)
HGB BLD-MCNC: 10.1 G/DL (ref 14–18)
HGB BLD-MCNC: 10.6 G/DL (ref 14–18)
HGB BLD-MCNC: 10.6 G/DL (ref 14–18)
HGB BLD-MCNC: 10.7 G/DL (ref 14–18)
HGB BLD-MCNC: 11 G/DL (ref 14–18)
HGB BLD-MCNC: 11.1 G/DL (ref 14–18)
HGB BLD-MCNC: 11.7 G/DL (ref 14–18)
HGB BLD-MCNC: 9.9 G/DL (ref 14–18)
HGB UR QL STRIP.AUTO: ABNORMAL
HGB UR QL STRIP.AUTO: ABNORMAL
HOLD SPECIMEN: NORMAL
HOROWITZ INDEX BLD+IHG-RTO: 30 %
HOROWITZ INDEX BLD+IHG-RTO: 50 %
HOROWITZ INDEX BLD+IHG-RTO: 80 %
HYALINE CASTS UR QL AUTO: ABNORMAL /LPF
HYALINE CASTS UR QL AUTO: ABNORMAL /LPF
IMM GRANULOCYTES # BLD AUTO: 0.61 10*3/MM3 (ref 0–0.05)
IMM GRANULOCYTES NFR BLD AUTO: 4.4 % (ref 0–5)
INR PPP: 1.33 (ref 0.91–1.09)
INR PPP: 1.67 (ref 0.91–1.09)
KETONES UR QL STRIP: NEGATIVE
KETONES UR QL STRIP: NEGATIVE
L PNEUMO1 AG UR QL IA: NEGATIVE
LEFT ATRIUM VOLUME INDEX: 17.2 ML/M2
LEFT ATRIUM VOLUME: 32.1 CM3
LEUKOCYTE ESTERASE UR QL STRIP.AUTO: ABNORMAL
LEUKOCYTE ESTERASE UR QL STRIP.AUTO: NEGATIVE
LYMPHOCYTES # BLD AUTO: 1.29 10*3/MM3 (ref 0.72–4.86)
LYMPHOCYTES # BLD MANUAL: 0.21 10*3/MM3 (ref 0.72–4.86)
LYMPHOCYTES # BLD MANUAL: 0.26 10*3/MM3 (ref 0.72–4.86)
LYMPHOCYTES # BLD MANUAL: 0.62 10*3/MM3 (ref 0.72–4.86)
LYMPHOCYTES NFR BLD AUTO: 9.4 % (ref 15–45)
LYMPHOCYTES NFR BLD MANUAL: 1 % (ref 15–45)
LYMPHOCYTES NFR BLD MANUAL: 1 % (ref 15–45)
LYMPHOCYTES NFR BLD MANUAL: 1.9 % (ref 15–45)
LYMPHOCYTES NFR BLD MANUAL: 2 % (ref 4–12)
LYMPHOCYTES NFR BLD MANUAL: 3 % (ref 4–12)
LYMPHOCYTES NFR BLD MANUAL: 6.5 % (ref 4–12)
Lab: ABNORMAL
MAGNESIUM SERPL-MCNC: 1.7 MG/DL (ref 1.4–2.2)
MAGNESIUM SERPL-MCNC: 2.2 MG/DL (ref 1.4–2.2)
MAGNESIUM SERPL-MCNC: 2.2 MG/DL (ref 1.4–2.2)
MAXIMAL PREDICTED HEART RATE: 161 BPM
MCH RBC QN AUTO: 24.3 PG (ref 28–32)
MCH RBC QN AUTO: 24.4 PG (ref 28–32)
MCH RBC QN AUTO: 24.5 PG (ref 28–32)
MCH RBC QN AUTO: 24.7 PG (ref 28–32)
MCH RBC QN AUTO: 25 PG (ref 28–32)
MCH RBC QN AUTO: 25.1 PG (ref 28–32)
MCH RBC QN AUTO: 25.2 PG (ref 28–32)
MCHC RBC AUTO-ENTMCNC: 32.1 G/DL (ref 33–36)
MCHC RBC AUTO-ENTMCNC: 32.1 G/DL (ref 33–36)
MCHC RBC AUTO-ENTMCNC: 32.4 G/DL (ref 33–36)
MCHC RBC AUTO-ENTMCNC: 32.4 G/DL (ref 33–36)
MCHC RBC AUTO-ENTMCNC: 32.6 G/DL (ref 33–36)
MCHC RBC AUTO-ENTMCNC: 32.6 G/DL (ref 33–36)
MCHC RBC AUTO-ENTMCNC: 33.4 G/DL (ref 33–36)
MCHC RBC AUTO-ENTMCNC: 33.6 G/DL (ref 33–36)
MCHC RBC AUTO-ENTMCNC: 34.5 G/DL (ref 33–36)
MCV RBC AUTO: 72.8 FL (ref 82–95)
MCV RBC AUTO: 73.6 FL (ref 82–95)
MCV RBC AUTO: 74.7 FL (ref 82–95)
MCV RBC AUTO: 75.4 FL (ref 82–95)
MCV RBC AUTO: 75.6 FL (ref 82–95)
MCV RBC AUTO: 76 FL (ref 82–95)
MCV RBC AUTO: 76 FL (ref 82–95)
MCV RBC AUTO: 76.2 FL (ref 82–95)
MCV RBC AUTO: 77.1 FL (ref 82–95)
METHADONE UR QL SCN: NEGATIVE
MICROCYTES BLD QL: ABNORMAL
MODALITY: ABNORMAL
MONOCYTES # BLD AUTO: 0.43 10*3/MM3 (ref 0.19–1.3)
MONOCYTES # BLD AUTO: 0.46 10*3/MM3 (ref 0.19–1.3)
MONOCYTES # BLD AUTO: 0.78 10*3/MM3 (ref 0.19–1.3)
MONOCYTES # BLD AUTO: 2.11 10*3/MM3 (ref 0.19–1.3)
MONOCYTES NFR BLD AUTO: 3.3 % (ref 4–12)
NEUTROPHILS # BLD AUTO: 11.11 10*3/MM3 (ref 1.87–8.4)
NEUTROPHILS # BLD AUTO: 20.77 10*3/MM3 (ref 1.87–8.4)
NEUTROPHILS # BLD AUTO: 25.11 10*3/MM3 (ref 1.87–8.4)
NEUTROPHILS # BLD AUTO: 29.67 10*3/MM3 (ref 1.87–8.4)
NEUTROPHILS NFR BLD AUTO: 80.6 % (ref 39–78)
NEUTROPHILS NFR BLD MANUAL: 89 % (ref 39–78)
NEUTROPHILS NFR BLD MANUAL: 90.7 % (ref 39–78)
NEUTROPHILS NFR BLD MANUAL: 96 % (ref 39–78)
NEUTS BAND NFR BLD MANUAL: 0.9 % (ref 0–10)
NEUTS BAND NFR BLD MANUAL: 8 % (ref 0–10)
NEUTS VAC BLD QL SMEAR: ABNORMAL
NITRITE UR QL STRIP: NEGATIVE
NITRITE UR QL STRIP: POSITIVE
NRBC BLD AUTO-RTO: 0 /100 WBC (ref 0–0)
NT-PROBNP SERPL-MCNC: ABNORMAL PG/ML (ref 0–900)
OPIATES UR QL: POSITIVE
PCO2 BLDA: 32.7 MM HG (ref 35–45)
PCO2 BLDA: 38.6 MM HG (ref 35–45)
PCO2 BLDA: 41.6 MM HG (ref 35–45)
PCO2 BLDA: 43.1 MM HG (ref 35–45)
PCP UR QL SCN: NEGATIVE
PEEP RESPIRATORY: 5 CM[H2O]
PH BLDA: 7.26 PH UNITS (ref 7.35–7.45)
PH BLDA: 7.29 PH UNITS (ref 7.35–7.45)
PH BLDA: 7.44 PH UNITS (ref 7.35–7.45)
PH BLDA: 7.52 PH UNITS (ref 7.35–7.45)
PH UR STRIP.AUTO: 7.5 [PH] (ref 5–8)
PH UR STRIP.AUTO: <=5 [PH] (ref 5–8)
PHOSPHATE SERPL-MCNC: 4.7 MG/DL (ref 2.5–4.5)
PHOSPHATE SERPL-MCNC: 6.1 MG/DL (ref 2.5–4.5)
PLAT MORPH BLD: NORMAL
PLATELET # BLD AUTO: 195 10*3/MM3 (ref 130–400)
PLATELET # BLD AUTO: 214 10*3/MM3 (ref 130–400)
PLATELET # BLD AUTO: 246 10*3/MM3 (ref 130–400)
PLATELET # BLD AUTO: 258 10*3/MM3 (ref 130–400)
PLATELET # BLD AUTO: 267 10*3/MM3 (ref 130–400)
PLATELET # BLD AUTO: 275 10*3/MM3 (ref 130–400)
PLATELET # BLD AUTO: 286 10*3/MM3 (ref 130–400)
PLATELET # BLD AUTO: 342 10*3/MM3 (ref 130–400)
PLATELET # BLD AUTO: 357 10*3/MM3 (ref 130–400)
PMV BLD AUTO: 10 FL (ref 6–12)
PMV BLD AUTO: 10.1 FL (ref 6–12)
PMV BLD AUTO: 10.3 FL (ref 6–12)
PMV BLD AUTO: 10.4 FL (ref 6–12)
PMV BLD AUTO: 10.4 FL (ref 6–12)
PMV BLD AUTO: 10.6 FL (ref 6–12)
PMV BLD AUTO: 11.3 FL (ref 6–12)
PMV BLD AUTO: 9.9 FL (ref 6–12)
PMV BLD AUTO: 9.9 FL (ref 6–12)
PO2 BLDA: 123 MM HG (ref 83–108)
PO2 BLDA: 129 MM HG (ref 83–108)
PO2 BLDA: 185 MM HG (ref 83–108)
PO2 BLDA: 72.7 MM HG (ref 83–108)
POIKILOCYTOSIS BLD QL SMEAR: ABNORMAL
POLYCHROMASIA BLD QL SMEAR: ABNORMAL
POLYCHROMASIA BLD QL SMEAR: ABNORMAL
POTASSIUM BLD-SCNC: 2.7 MMOL/L (ref 3.5–5.3)
POTASSIUM BLD-SCNC: 3.1 MMOL/L (ref 3.5–5.3)
POTASSIUM BLD-SCNC: 3.1 MMOL/L (ref 3.5–5.3)
POTASSIUM BLD-SCNC: 3.2 MMOL/L (ref 3.5–5.3)
POTASSIUM BLD-SCNC: 3.4 MMOL/L (ref 3.5–5.3)
POTASSIUM BLD-SCNC: 3.4 MMOL/L (ref 3.5–5.3)
POTASSIUM BLD-SCNC: 3.5 MMOL/L (ref 3.5–5.3)
POTASSIUM BLD-SCNC: 5.2 MMOL/L (ref 3.5–5.3)
POTASSIUM BLD-SCNC: 5.6 MMOL/L (ref 3.5–5.3)
PROCALCITONIN SERPL-MCNC: 0.29 NG/ML
PROCALCITONIN SERPL-MCNC: 8.04 NG/ML
PROT SERPL-MCNC: 4.6 G/DL (ref 6.3–8.7)
PROT SERPL-MCNC: 5.7 G/DL (ref 6.3–8.7)
PROT SERPL-MCNC: 5.7 G/DL (ref 6.3–8.7)
PROT SERPL-MCNC: 5.8 G/DL (ref 6.3–8.7)
PROT UR QL STRIP: ABNORMAL
PROT UR QL STRIP: ABNORMAL
PROTHROMBIN TIME: 16.9 SECONDS (ref 11.9–14.6)
PROTHROMBIN TIME: 20.3 SECONDS (ref 11.9–14.6)
RBC # BLD AUTO: 4.04 10*6/MM3 (ref 4.8–5.9)
RBC # BLD AUTO: 4.09 10*6/MM3 (ref 4.8–5.9)
RBC # BLD AUTO: 4.15 10*6/MM3 (ref 4.8–5.9)
RBC # BLD AUTO: 4.24 10*6/MM3 (ref 4.8–5.9)
RBC # BLD AUTO: 4.26 10*6/MM3 (ref 4.8–5.9)
RBC # BLD AUTO: 4.3 10*6/MM3 (ref 4.8–5.9)
RBC # BLD AUTO: 4.46 10*6/MM3 (ref 4.8–5.9)
RBC # BLD AUTO: 4.55 10*6/MM3 (ref 4.8–5.9)
RBC # BLD AUTO: 4.64 10*6/MM3 (ref 4.8–5.9)
RBC # UR: ABNORMAL /HPF
RBC # UR: ABNORMAL /HPF
REF LAB TEST METHOD: ABNORMAL
REF LAB TEST METHOD: ABNORMAL
RH BLD: POSITIVE
S PNEUM AG SPEC QL LA: NEGATIVE
SAO2 % BLDCOA: 93.7 % (ref 94–99)
SAO2 % BLDCOA: 99.3 % (ref 94–99)
SAO2 % BLDCOA: 99.4 % (ref 94–99)
SAO2 % BLDCOA: 99.7 % (ref 94–99)
SET MECH RESP RATE: 14
SODIUM BLD-SCNC: 129 MMOL/L (ref 135–145)
SODIUM BLD-SCNC: 133 MMOL/L (ref 135–145)
SODIUM BLD-SCNC: 135 MMOL/L (ref 135–145)
SODIUM BLD-SCNC: 135 MMOL/L (ref 135–145)
SODIUM BLD-SCNC: 138 MMOL/L (ref 135–145)
SODIUM BLD-SCNC: 140 MMOL/L (ref 135–145)
SODIUM BLD-SCNC: 144 MMOL/L (ref 135–145)
SP GR UR STRIP: 1.02 (ref 1–1.03)
SP GR UR STRIP: 1.02 (ref 1–1.03)
SQUAMOUS #/AREA URNS HPF: ABNORMAL /HPF
SQUAMOUS #/AREA URNS HPF: ABNORMAL /HPF
STRESS TARGET HR: 137 BPM
T&S EXPIRATION DATE: NORMAL
TOXIC GRANULATION: ABNORMAL
TROPONIN I SERPL-MCNC: 0.01 NG/ML (ref 0–0.03)
TROPONIN I SERPL-MCNC: 0.48 NG/ML (ref 0–0.03)
TSH SERPL DL<=0.05 MIU/L-ACNC: 0.84 MIU/ML (ref 0.47–4.68)
UROBILINOGEN UR QL STRIP: ABNORMAL
UROBILINOGEN UR QL STRIP: ABNORMAL
VANCOMYCIN SERPL-MCNC: 8.05 MCG/ML (ref 5–40)
VANCOMYCIN TROUGH SERPL-MCNC: 19.14 MCG/ML (ref 10–20)
VANCOMYCIN TROUGH SERPL-MCNC: 7.41 MCG/ML (ref 10–20)
VENTILATOR MODE: ABNORMAL
VENTILATOR MODE: AC
VT ON VENT VENT: 550 ML
WBC MORPH BLD: NORMAL
WBC MORPH BLD: NORMAL
WBC NRBC COR # BLD: 10.2 10*3/MM3 (ref 4.8–10.8)
WBC NRBC COR # BLD: 11.28 10*3/MM3 (ref 4.8–10.8)
WBC NRBC COR # BLD: 12.2 10*3/MM3 (ref 4.8–10.8)
WBC NRBC COR # BLD: 13.79 10*3/MM3 (ref 4.8–10.8)
WBC NRBC COR # BLD: 14.33 10*3/MM3 (ref 4.8–10.8)
WBC NRBC COR # BLD: 21.41 10*3/MM3 (ref 4.8–10.8)
WBC NRBC COR # BLD: 26.16 10*3/MM3 (ref 4.8–10.8)
WBC NRBC COR # BLD: 32.39 10*3/MM3 (ref 4.8–10.8)
WBC NRBC COR # BLD: 8.43 10*3/MM3 (ref 4.8–10.8)
WBC UR QL AUTO: ABNORMAL /HPF
WBC UR QL AUTO: ABNORMAL /HPF

## 2019-01-01 PROCEDURE — 85027 COMPLETE CBC AUTOMATED: CPT | Performed by: INTERNAL MEDICINE

## 2019-01-01 PROCEDURE — 25010000002 VANCOMYCIN 10 G RECONSTITUTED SOLUTION: Performed by: INTERNAL MEDICINE

## 2019-01-01 PROCEDURE — 71045 X-RAY EXAM CHEST 1 VIEW: CPT

## 2019-01-01 PROCEDURE — 63710000001 INSULIN LISPRO (HUMAN) PER 5 UNITS: Performed by: INTERNAL MEDICINE

## 2019-01-01 PROCEDURE — 80053 COMPREHEN METABOLIC PANEL: CPT | Performed by: INTERNAL MEDICINE

## 2019-01-01 PROCEDURE — 82803 BLOOD GASES ANY COMBINATION: CPT

## 2019-01-01 PROCEDURE — 82962 GLUCOSE BLOOD TEST: CPT

## 2019-01-01 PROCEDURE — 94760 N-INVAS EAR/PLS OXIMETRY 1: CPT

## 2019-01-01 PROCEDURE — 25010000002 MEROPENEM PER 100 MG: Performed by: FAMILY MEDICINE

## 2019-01-01 PROCEDURE — 94003 VENT MGMT INPAT SUBQ DAY: CPT

## 2019-01-01 PROCEDURE — 86850 RBC ANTIBODY SCREEN: CPT | Performed by: INTERNAL MEDICINE

## 2019-01-01 PROCEDURE — 85025 COMPLETE CBC W/AUTO DIFF WBC: CPT | Performed by: INTERNAL MEDICINE

## 2019-01-01 PROCEDURE — 87185 SC STD ENZYME DETCJ PER NZM: CPT | Performed by: INTERNAL MEDICINE

## 2019-01-01 PROCEDURE — 94799 UNLISTED PULMONARY SVC/PX: CPT

## 2019-01-01 PROCEDURE — 83605 ASSAY OF LACTIC ACID: CPT | Performed by: INTERNAL MEDICINE

## 2019-01-01 PROCEDURE — 94770: CPT

## 2019-01-01 PROCEDURE — 92526 ORAL FUNCTION THERAPY: CPT

## 2019-01-01 PROCEDURE — 03HY32Z INSERTION OF MONITORING DEVICE INTO UPPER ARTERY, PERCUTANEOUS APPROACH: ICD-10-PCS | Performed by: INTERNAL MEDICINE

## 2019-01-01 PROCEDURE — 25010000002 ENOXAPARIN PER 10 MG: Performed by: INTERNAL MEDICINE

## 2019-01-01 PROCEDURE — 92610 EVALUATE SWALLOWING FUNCTION: CPT | Performed by: SPEECH-LANGUAGE PATHOLOGIST

## 2019-01-01 PROCEDURE — C1751 CATH, INF, PER/CENT/MIDLINE: HCPCS

## 2019-01-01 PROCEDURE — 85007 BL SMEAR W/DIFF WBC COUNT: CPT | Performed by: INTERNAL MEDICINE

## 2019-01-01 PROCEDURE — B543ZZA ULTRASONOGRAPHY OF RIGHT JUGULAR VEINS, GUIDANCE: ICD-10-PCS | Performed by: INTERNAL MEDICINE

## 2019-01-01 PROCEDURE — 80307 DRUG TEST PRSMV CHEM ANLYZR: CPT | Performed by: INTERNAL MEDICINE

## 2019-01-01 PROCEDURE — 87040 BLOOD CULTURE FOR BACTERIA: CPT | Performed by: INTERNAL MEDICINE

## 2019-01-01 PROCEDURE — 25010000002 EPINEPHRINE PF 1 MG/10ML SOLUTION PREFILLED SYRINGE: Performed by: INTERNAL MEDICINE

## 2019-01-01 PROCEDURE — 97162 PT EVAL MOD COMPLEX 30 MIN: CPT

## 2019-01-01 PROCEDURE — 25010000002 CEFEPIME PER 500 MG: Performed by: FAMILY MEDICINE

## 2019-01-01 PROCEDURE — 93970 EXTREMITY STUDY: CPT

## 2019-01-01 PROCEDURE — 85610 PROTHROMBIN TIME: CPT | Performed by: INTERNAL MEDICINE

## 2019-01-01 PROCEDURE — 97110 THERAPEUTIC EXERCISES: CPT

## 2019-01-01 PROCEDURE — 25010000002 MEROPENEM PER 100 MG: Performed by: INTERNAL MEDICINE

## 2019-01-01 PROCEDURE — 25010000002 PIPERACILLIN SOD-TAZOBACTAM PER 1 G: Performed by: INTERNAL MEDICINE

## 2019-01-01 PROCEDURE — 92950 HEART/LUNG RESUSCITATION CPR: CPT

## 2019-01-01 PROCEDURE — 25010000002 AMIODARONE IN DEXTROSE 5% 360-4.14 MG/200ML-% SOLUTION: Performed by: INTERNAL MEDICINE

## 2019-01-01 PROCEDURE — 25010000002 HYDROCORTISONE SODIUM SUCCINATE 100 MG RECONSTITUTED SOLUTION: Performed by: INTERNAL MEDICINE

## 2019-01-01 PROCEDURE — 83036 HEMOGLOBIN GLYCOSYLATED A1C: CPT | Performed by: INTERNAL MEDICINE

## 2019-01-01 PROCEDURE — 86901 BLOOD TYPING SEROLOGIC RH(D): CPT | Performed by: INTERNAL MEDICINE

## 2019-01-01 PROCEDURE — 87077 CULTURE AEROBIC IDENTIFY: CPT | Performed by: INTERNAL MEDICINE

## 2019-01-01 PROCEDURE — 05HM33Z INSERTION OF INFUSION DEVICE INTO RIGHT INTERNAL JUGULAR VEIN, PERCUTANEOUS APPROACH: ICD-10-PCS | Performed by: INTERNAL MEDICINE

## 2019-01-01 PROCEDURE — 84145 PROCALCITONIN (PCT): CPT | Performed by: INTERNAL MEDICINE

## 2019-01-01 PROCEDURE — 93005 ELECTROCARDIOGRAM TRACING: CPT | Performed by: INTERNAL MEDICINE

## 2019-01-01 PROCEDURE — 94002 VENT MGMT INPAT INIT DAY: CPT

## 2019-01-01 PROCEDURE — 87086 URINE CULTURE/COLONY COUNT: CPT | Performed by: INTERNAL MEDICINE

## 2019-01-01 PROCEDURE — 25010000002 ONDANSETRON PER 1 MG: Performed by: INTERNAL MEDICINE

## 2019-01-01 PROCEDURE — 93306 TTE W/DOPPLER COMPLETE: CPT | Performed by: INTERNAL MEDICINE

## 2019-01-01 PROCEDURE — 74018 RADEX ABDOMEN 1 VIEW: CPT

## 2019-01-01 PROCEDURE — 80202 ASSAY OF VANCOMYCIN: CPT | Performed by: INTERNAL MEDICINE

## 2019-01-01 PROCEDURE — 80048 BASIC METABOLIC PNL TOTAL CA: CPT | Performed by: INTERNAL MEDICINE

## 2019-01-01 PROCEDURE — 84484 ASSAY OF TROPONIN QUANT: CPT | Performed by: INTERNAL MEDICINE

## 2019-01-01 PROCEDURE — 25010000002 VANCOMYCIN 10 G RECONSTITUTED SOLUTION: Performed by: FAMILY MEDICINE

## 2019-01-01 PROCEDURE — 81001 URINALYSIS AUTO W/SCOPE: CPT | Performed by: INTERNAL MEDICINE

## 2019-01-01 PROCEDURE — 25010000002 AMIODARONE IN DEXTROSE 5% 150-4.21 MG/100ML-% SOLUTION: Performed by: INTERNAL MEDICINE

## 2019-01-01 PROCEDURE — 36600 WITHDRAWAL OF ARTERIAL BLOOD: CPT

## 2019-01-01 PROCEDURE — 83735 ASSAY OF MAGNESIUM: CPT | Performed by: INTERNAL MEDICINE

## 2019-01-01 PROCEDURE — 92611 MOTION FLUOROSCOPY/SWALLOW: CPT

## 2019-01-01 PROCEDURE — 97530 THERAPEUTIC ACTIVITIES: CPT

## 2019-01-01 PROCEDURE — 80202 ASSAY OF VANCOMYCIN: CPT | Performed by: FAMILY MEDICINE

## 2019-01-01 PROCEDURE — 84100 ASSAY OF PHOSPHORUS: CPT | Performed by: INTERNAL MEDICINE

## 2019-01-01 PROCEDURE — 87899 AGENT NOS ASSAY W/OPTIC: CPT | Performed by: INTERNAL MEDICINE

## 2019-01-01 PROCEDURE — 87804 INFLUENZA ASSAY W/OPTIC: CPT | Performed by: INTERNAL MEDICINE

## 2019-01-01 PROCEDURE — 86900 BLOOD TYPING SEROLOGIC ABO: CPT | Performed by: INTERNAL MEDICINE

## 2019-01-01 PROCEDURE — 87070 CULTURE OTHR SPECIMN AEROBIC: CPT | Performed by: INTERNAL MEDICINE

## 2019-01-01 PROCEDURE — B54MZZA ULTRASONOGRAPHY OF RIGHT UPPER EXTREMITY VEINS, GUIDANCE: ICD-10-PCS | Performed by: INTERNAL MEDICINE

## 2019-01-01 PROCEDURE — 87147 CULTURE TYPE IMMUNOLOGIC: CPT | Performed by: INTERNAL MEDICINE

## 2019-01-01 PROCEDURE — 97167 OT EVAL HIGH COMPLEX 60 MIN: CPT

## 2019-01-01 PROCEDURE — 84443 ASSAY THYROID STIM HORMONE: CPT | Performed by: INTERNAL MEDICINE

## 2019-01-01 PROCEDURE — 25010000002 PROPOFOL 1000 MG/ML EMULSION: Performed by: INTERNAL MEDICINE

## 2019-01-01 PROCEDURE — 25010000002 VANCOMYCIN PER 500 MG: Performed by: INTERNAL MEDICINE

## 2019-01-01 PROCEDURE — 0BH17EZ INSERTION OF ENDOTRACHEAL AIRWAY INTO TRACHEA, VIA NATURAL OR ARTIFICIAL OPENING: ICD-10-PCS | Performed by: INTERNAL MEDICINE

## 2019-01-01 PROCEDURE — 94640 AIRWAY INHALATION TREATMENT: CPT

## 2019-01-01 PROCEDURE — 25010000002 VANCOMYCIN PER 500 MG: Performed by: FAMILY MEDICINE

## 2019-01-01 PROCEDURE — 25010000002 PERFLUTREN 6.52 MG/ML SUSPENSION: Performed by: INTERNAL MEDICINE

## 2019-01-01 PROCEDURE — 05HD33Z INSERTION OF INFUSION DEVICE INTO RIGHT CEPHALIC VEIN, PERCUTANEOUS APPROACH: ICD-10-PCS | Performed by: INTERNAL MEDICINE

## 2019-01-01 PROCEDURE — 5A1945Z RESPIRATORY VENTILATION, 24-96 CONSECUTIVE HOURS: ICD-10-PCS | Performed by: INTERNAL MEDICINE

## 2019-01-01 PROCEDURE — 74230 X-RAY XM SWLNG FUNCJ C+: CPT

## 2019-01-01 PROCEDURE — 87040 BLOOD CULTURE FOR BACTERIA: CPT | Performed by: FAMILY MEDICINE

## 2019-01-01 PROCEDURE — 93010 ELECTROCARDIOGRAM REPORT: CPT | Performed by: NURSE PRACTITIONER

## 2019-01-01 PROCEDURE — 05PY33Z REMOVAL OF INFUSION DEVICE FROM UPPER VEIN, PERCUTANEOUS APPROACH: ICD-10-PCS | Performed by: INTERNAL MEDICINE

## 2019-01-01 PROCEDURE — 97535 SELF CARE MNGMENT TRAINING: CPT

## 2019-01-01 PROCEDURE — 31500 INSERT EMERGENCY AIRWAY: CPT | Performed by: INTERNAL MEDICINE

## 2019-01-01 PROCEDURE — 83880 ASSAY OF NATRIURETIC PEPTIDE: CPT | Performed by: INTERNAL MEDICINE

## 2019-01-01 PROCEDURE — 87493 C DIFF AMPLIFIED PROBE: CPT | Performed by: INTERNAL MEDICINE

## 2019-01-01 PROCEDURE — 93010 ELECTROCARDIOGRAM REPORT: CPT | Performed by: INTERNAL MEDICINE

## 2019-01-01 PROCEDURE — 93306 TTE W/DOPPLER COMPLETE: CPT

## 2019-01-01 PROCEDURE — 93970 EXTREMITY STUDY: CPT | Performed by: SURGERY

## 2019-01-01 PROCEDURE — 87205 SMEAR GRAM STAIN: CPT | Performed by: INTERNAL MEDICINE

## 2019-01-01 PROCEDURE — 87186 SC STD MICRODIL/AGAR DIL: CPT | Performed by: INTERNAL MEDICINE

## 2019-01-01 PROCEDURE — 92610 EVALUATE SWALLOWING FUNCTION: CPT

## 2019-01-01 PROCEDURE — 36410 VNPNXR 3YR/> PHY/QHP DX/THER: CPT

## 2019-01-01 RX ORDER — SODIUM CHLORIDE 0.9 % (FLUSH) 0.9 %
3 SYRINGE (ML) INJECTION EVERY 12 HOURS SCHEDULED
Status: DISCONTINUED | OUTPATIENT
Start: 2019-01-01 | End: 2019-01-01 | Stop reason: HOSPADM

## 2019-01-01 RX ORDER — DEXTROSE MONOHYDRATE 25 G/50ML
25 INJECTION, SOLUTION INTRAVENOUS
Status: DISCONTINUED | OUTPATIENT
Start: 2019-01-01 | End: 2019-01-01 | Stop reason: SDUPTHER

## 2019-01-01 RX ORDER — ARIPIPRAZOLE 5 MG/1
5 TABLET ORAL DAILY
Status: DISCONTINUED | OUTPATIENT
Start: 2019-01-01 | End: 2019-01-01 | Stop reason: HOSPADM

## 2019-01-01 RX ORDER — VANCOMYCIN HYDROCHLORIDE 1 G/200ML
1000 INJECTION, SOLUTION INTRAVENOUS EVERY 12 HOURS
Status: DISCONTINUED | OUTPATIENT
Start: 2019-01-01 | End: 2019-01-01

## 2019-01-01 RX ORDER — ACETAMINOPHEN AND CODEINE PHOSPHATE 300; 60 MG/1; MG/1
1 TABLET ORAL EVERY 8 HOURS PRN
Qty: 9 TABLET | Refills: 0 | Status: SHIPPED | OUTPATIENT
Start: 2019-01-01

## 2019-01-01 RX ORDER — PAROXETINE 10 MG/1
10 TABLET, FILM COATED ORAL EVERY MORNING
COMMUNITY

## 2019-01-01 RX ORDER — CELECOXIB 200 MG/1
200 CAPSULE ORAL 2 TIMES DAILY
COMMUNITY

## 2019-01-01 RX ORDER — FUROSEMIDE 20 MG/1
20 TABLET ORAL DAILY PRN
COMMUNITY

## 2019-01-01 RX ORDER — POTASSIUM CHLORIDE 20 MEQ/1
20 TABLET, EXTENDED RELEASE ORAL DAILY
COMMUNITY

## 2019-01-01 RX ORDER — DIPHENOXYLATE HYDROCHLORIDE AND ATROPINE SULFATE 2.5; .025 MG/1; MG/1
1 TABLET ORAL EVERY 8 HOURS PRN
Status: ON HOLD | COMMUNITY
End: 2019-01-01 | Stop reason: SDUPTHER

## 2019-01-01 RX ORDER — FAMOTIDINE 10 MG/ML
20 INJECTION, SOLUTION INTRAVENOUS DAILY
Status: DISCONTINUED | OUTPATIENT
Start: 2019-01-01 | End: 2019-01-01

## 2019-01-01 RX ORDER — NICOTINE POLACRILEX 4 MG
15 LOZENGE BUCCAL
Status: DISCONTINUED | OUTPATIENT
Start: 2019-01-01 | End: 2019-01-01 | Stop reason: SDUPTHER

## 2019-01-01 RX ORDER — DIAZEPAM 5 MG/1
5 TABLET ORAL 2 TIMES DAILY
Qty: 9 TABLET | Refills: 0 | Status: SHIPPED | OUTPATIENT
Start: 2019-01-01

## 2019-01-01 RX ORDER — DIPHENOXYLATE HYDROCHLORIDE AND ATROPINE SULFATE 2.5; .025 MG/1; MG/1
1 TABLET ORAL EVERY 8 HOURS PRN
Qty: 5 TABLET | Refills: 0 | Status: SHIPPED | OUTPATIENT
Start: 2019-01-01

## 2019-01-01 RX ORDER — PAROXETINE 10 MG/1
10 TABLET, FILM COATED ORAL DAILY
Status: DISCONTINUED | OUTPATIENT
Start: 2019-01-01 | End: 2019-01-01 | Stop reason: HOSPADM

## 2019-01-01 RX ORDER — POTASSIUM CHLORIDE 750 MG/1
40 CAPSULE, EXTENDED RELEASE ORAL EVERY 4 HOURS
Status: COMPLETED | OUTPATIENT
Start: 2019-01-01 | End: 2019-01-01

## 2019-01-01 RX ORDER — GABAPENTIN 300 MG/1
300 CAPSULE ORAL 2 TIMES DAILY
Status: ON HOLD | COMMUNITY
End: 2019-01-01 | Stop reason: SDUPTHER

## 2019-01-01 RX ORDER — LEVOTHYROXINE SODIUM 0.03 MG/1
12.5 TABLET ORAL
Status: DISCONTINUED | OUTPATIENT
Start: 2019-01-01 | End: 2019-01-01 | Stop reason: HOSPADM

## 2019-01-01 RX ORDER — BUSPIRONE HYDROCHLORIDE 10 MG/1
10 TABLET ORAL 2 TIMES DAILY
COMMUNITY

## 2019-01-01 RX ORDER — LEVOTHYROXINE SODIUM 0.03 MG/1
25 TABLET ORAL DAILY
Status: ON HOLD | COMMUNITY
End: 2019-01-01

## 2019-01-01 RX ORDER — NICOTINE POLACRILEX 4 MG
15 LOZENGE BUCCAL
Status: DISCONTINUED | OUTPATIENT
Start: 2019-01-01 | End: 2019-01-01 | Stop reason: HOSPADM

## 2019-01-01 RX ORDER — DIAZEPAM 5 MG/1
5 TABLET ORAL EVERY 12 HOURS PRN
Status: DISCONTINUED | OUTPATIENT
Start: 2019-01-01 | End: 2019-01-01 | Stop reason: HOSPADM

## 2019-01-01 RX ORDER — BUSPIRONE HYDROCHLORIDE 10 MG/1
10 TABLET ORAL EVERY 12 HOURS SCHEDULED
Status: DISCONTINUED | OUTPATIENT
Start: 2019-01-01 | End: 2019-01-01 | Stop reason: HOSPADM

## 2019-01-01 RX ORDER — LISINOPRIL 20 MG/1
20 TABLET ORAL
Status: DISCONTINUED | OUTPATIENT
Start: 2019-01-01 | End: 2019-01-01 | Stop reason: HOSPADM

## 2019-01-01 RX ORDER — IPRATROPIUM BROMIDE AND ALBUTEROL SULFATE 2.5; .5 MG/3ML; MG/3ML
3 SOLUTION RESPIRATORY (INHALATION)
Status: DISCONTINUED | OUTPATIENT
Start: 2019-01-01 | End: 2019-01-01

## 2019-01-01 RX ORDER — SODIUM CHLORIDE 0.9 % (FLUSH) 0.9 %
3-10 SYRINGE (ML) INJECTION AS NEEDED
Status: DISCONTINUED | OUTPATIENT
Start: 2019-01-01 | End: 2019-01-01 | Stop reason: HOSPADM

## 2019-01-01 RX ORDER — PROMETHAZINE HYDROCHLORIDE 12.5 MG/1
12.5 TABLET ORAL EVERY 6 HOURS PRN
COMMUNITY

## 2019-01-01 RX ORDER — CYCLOBENZAPRINE HCL 5 MG
5 TABLET ORAL 3 TIMES DAILY PRN
COMMUNITY

## 2019-01-01 RX ORDER — VANCOMYCIN HYDROCHLORIDE 1 G/200ML
1000 INJECTION, SOLUTION INTRAVENOUS EVERY 8 HOURS
Status: DISCONTINUED | OUTPATIENT
Start: 2019-01-01 | End: 2019-01-01 | Stop reason: SDUPTHER

## 2019-01-01 RX ORDER — ONDANSETRON 2 MG/ML
4 INJECTION INTRAMUSCULAR; INTRAVENOUS EVERY 6 HOURS PRN
Status: DISCONTINUED | OUTPATIENT
Start: 2019-01-01 | End: 2019-01-01 | Stop reason: HOSPADM

## 2019-01-01 RX ORDER — POTASSIUM CHLORIDE 750 MG/1
40 CAPSULE, EXTENDED RELEASE ORAL DAILY
Status: COMPLETED | OUTPATIENT
Start: 2019-01-01 | End: 2019-01-01

## 2019-01-01 RX ORDER — POTASSIUM CHLORIDE 750 MG/1
40 CAPSULE, EXTENDED RELEASE ORAL ONCE
Status: COMPLETED | OUTPATIENT
Start: 2019-01-01 | End: 2019-01-01

## 2019-01-01 RX ORDER — DEXTROSE MONOHYDRATE 25 G/50ML
25 INJECTION, SOLUTION INTRAVENOUS
Status: DISCONTINUED | OUTPATIENT
Start: 2019-01-01 | End: 2019-01-01 | Stop reason: HOSPADM

## 2019-01-01 RX ORDER — FLUTICASONE PROPIONATE 50 MCG
2 SPRAY, SUSPENSION (ML) NASAL DAILY
COMMUNITY

## 2019-01-01 RX ORDER — LEVOTHYROXINE SODIUM 0.03 MG/1
25 TABLET ORAL DAILY
COMMUNITY

## 2019-01-01 RX ORDER — CLOTRIMAZOLE 10 MG/1
10 LOZENGE ORAL; TOPICAL
Status: DISPENSED | OUTPATIENT
Start: 2019-01-01 | End: 2019-01-01

## 2019-01-01 RX ORDER — ACETAMINOPHEN 325 MG/1
650 TABLET ORAL EVERY 6 HOURS PRN
Status: DISCONTINUED | OUTPATIENT
Start: 2019-01-01 | End: 2019-01-01 | Stop reason: HOSPADM

## 2019-01-01 RX ORDER — GABAPENTIN 300 MG/1
300 CAPSULE ORAL 2 TIMES DAILY
Qty: 6 CAPSULE | Refills: 0 | Status: SHIPPED | OUTPATIENT
Start: 2019-01-01

## 2019-01-01 RX ORDER — AMIODARONE HYDROCHLORIDE 200 MG/1
200 TABLET ORAL EVERY 12 HOURS SCHEDULED
Status: DISCONTINUED | OUTPATIENT
Start: 2019-01-01 | End: 2019-01-01 | Stop reason: HOSPADM

## 2019-01-01 RX ORDER — ARIPIPRAZOLE 5 MG/1
5 TABLET ORAL DAILY
COMMUNITY

## 2019-01-01 RX ORDER — AMIODARONE HYDROCHLORIDE 200 MG/1
200 TABLET ORAL DAILY
Start: 2019-01-01

## 2019-01-01 RX ADMIN — DILTIAZEM HYDROCHLORIDE 5 MG/HR: 5 INJECTION INTRAVENOUS at 03:18

## 2019-01-01 RX ADMIN — SODIUM CHLORIDE, PRESERVATIVE FREE 3 ML: 5 INJECTION INTRAVENOUS at 09:50

## 2019-01-01 RX ADMIN — AMIODARONE HYDROCHLORIDE 200 MG: 200 TABLET ORAL at 09:17

## 2019-01-01 RX ADMIN — ARIPIPRAZOLE 5 MG: 5 TABLET ORAL at 09:06

## 2019-01-01 RX ADMIN — VANCOMYCIN HYDROCHLORIDE 1000 MG: 1 INJECTION, SOLUTION INTRAVENOUS at 09:09

## 2019-01-01 RX ADMIN — SODIUM CHLORIDE, POTASSIUM CHLORIDE, SODIUM LACTATE AND CALCIUM CHLORIDE 1000 ML: 600; 310; 30; 20 INJECTION, SOLUTION INTRAVENOUS at 16:34

## 2019-01-01 RX ADMIN — IPRATROPIUM BROMIDE AND ALBUTEROL SULFATE 3 ML: 2.5; .5 SOLUTION RESPIRATORY (INHALATION) at 23:48

## 2019-01-01 RX ADMIN — DILTIAZEM HYDROCHLORIDE 5 MG/HR: 5 INJECTION INTRAVENOUS at 08:00

## 2019-01-01 RX ADMIN — VASOPRESSIN 0.04 UNITS/MIN: 20 INJECTION INTRAVENOUS at 15:12

## 2019-01-01 RX ADMIN — AMIODARONE HYDROCHLORIDE 200 MG: 200 TABLET ORAL at 09:06

## 2019-01-01 RX ADMIN — HYDROCORTISONE SODIUM SUCCINATE 25 MG: 100 INJECTION, POWDER, FOR SOLUTION INTRAMUSCULAR; INTRAVENOUS at 21:04

## 2019-01-01 RX ADMIN — METOPROLOL TARTRATE 25 MG: 25 TABLET, FILM COATED ORAL at 10:11

## 2019-01-01 RX ADMIN — METOPROLOL TARTRATE 25 MG: 25 TABLET, FILM COATED ORAL at 08:42

## 2019-01-01 RX ADMIN — IPRATROPIUM BROMIDE AND ALBUTEROL SULFATE 3 ML: 2.5; .5 SOLUTION RESPIRATORY (INHALATION) at 01:10

## 2019-01-01 RX ADMIN — LEVOTHYROXINE SODIUM 12.5 MCG: 25 TABLET ORAL at 09:17

## 2019-01-01 RX ADMIN — PROPOFOL 15 MCG/KG/MIN: 10 INJECTION, EMULSION INTRAVENOUS at 09:20

## 2019-01-01 RX ADMIN — IPRATROPIUM BROMIDE 0.5 MG: 0.5 SOLUTION RESPIRATORY (INHALATION) at 07:07

## 2019-01-01 RX ADMIN — ARIPIPRAZOLE 5 MG: 5 TABLET ORAL at 09:37

## 2019-01-01 RX ADMIN — IPRATROPIUM BROMIDE 0.5 MG: 0.5 SOLUTION RESPIRATORY (INHALATION) at 21:36

## 2019-01-01 RX ADMIN — INSULIN LISPRO 4 UNITS: 100 INJECTION, SOLUTION INTRAVENOUS; SUBCUTANEOUS at 23:55

## 2019-01-01 RX ADMIN — MEROPENEM 1 G: 1 INJECTION, POWDER, FOR SOLUTION INTRAVENOUS at 14:50

## 2019-01-01 RX ADMIN — METFORMIN HYDROCHLORIDE 500 MG: 500 TABLET ORAL at 17:52

## 2019-01-01 RX ADMIN — DILTIAZEM HYDROCHLORIDE 15 MG/HR: 5 INJECTION INTRAVENOUS at 16:03

## 2019-01-01 RX ADMIN — METFORMIN HYDROCHLORIDE 500 MG: 500 TABLET ORAL at 09:06

## 2019-01-01 RX ADMIN — MEROPENEM 1 G: 1 INJECTION, POWDER, FOR SOLUTION INTRAVENOUS at 20:24

## 2019-01-01 RX ADMIN — ACETAMINOPHEN 650 MG: 325 TABLET, FILM COATED ORAL at 14:02

## 2019-01-01 RX ADMIN — HYDROCORTISONE SODIUM SUCCINATE 50 MG: 100 INJECTION, POWDER, FOR SOLUTION INTRAMUSCULAR; INTRAVENOUS at 04:08

## 2019-01-01 RX ADMIN — SODIUM CHLORIDE, PRESERVATIVE FREE 3 ML: 5 INJECTION INTRAVENOUS at 09:37

## 2019-01-01 RX ADMIN — INSULIN LISPRO 4 UNITS: 100 INJECTION, SOLUTION INTRAVENOUS; SUBCUTANEOUS at 21:12

## 2019-01-01 RX ADMIN — PAROXETINE 10 MG: 10 TABLET, FILM COATED ORAL at 08:42

## 2019-01-01 RX ADMIN — TAZOBACTAM SODIUM AND PIPERACILLIN SODIUM 4.5 G: 500; 4 INJECTION, SOLUTION INTRAVENOUS at 23:56

## 2019-01-01 RX ADMIN — IPRATROPIUM BROMIDE AND ALBUTEROL SULFATE 3 ML: 2.5; .5 SOLUTION RESPIRATORY (INHALATION) at 04:11

## 2019-01-01 RX ADMIN — POTASSIUM CHLORIDE 40 MEQ: 750 CAPSULE, EXTENDED RELEASE ORAL at 13:37

## 2019-01-01 RX ADMIN — VANCOMYCIN HYDROCHLORIDE 1000 MG: 1 INJECTION, SOLUTION INTRAVENOUS at 08:56

## 2019-01-01 RX ADMIN — IPRATROPIUM BROMIDE 0.5 MG: 0.5 SOLUTION RESPIRATORY (INHALATION) at 16:37

## 2019-01-01 RX ADMIN — METOPROLOL TARTRATE 25 MG: 25 TABLET, FILM COATED ORAL at 09:17

## 2019-01-01 RX ADMIN — IPRATROPIUM BROMIDE 0.5 MG: 0.5 SOLUTION RESPIRATORY (INHALATION) at 19:58

## 2019-01-01 RX ADMIN — SODIUM CHLORIDE, PRESERVATIVE FREE 3 ML: 5 INJECTION INTRAVENOUS at 20:40

## 2019-01-01 RX ADMIN — PROPOFOL 5 MCG/KG/MIN: 10 INJECTION, EMULSION INTRAVENOUS at 03:09

## 2019-01-01 RX ADMIN — IPRATROPIUM BROMIDE 0.5 MG: 0.5 SOLUTION RESPIRATORY (INHALATION) at 10:55

## 2019-01-01 RX ADMIN — AMIODARONE HYDROCHLORIDE 200 MG: 200 TABLET ORAL at 10:11

## 2019-01-01 RX ADMIN — INSULIN LISPRO 20 UNITS: 100 INJECTION, SOLUTION INTRAVENOUS; SUBCUTANEOUS at 09:38

## 2019-01-01 RX ADMIN — HYDROCORTISONE SODIUM SUCCINATE 25 MG: 100 INJECTION, POWDER, FOR SOLUTION INTRAMUSCULAR; INTRAVENOUS at 03:48

## 2019-01-01 RX ADMIN — ACETAMINOPHEN 650 MG: 325 TABLET, FILM COATED ORAL at 18:22

## 2019-01-01 RX ADMIN — BUSPIRONE HYDROCHLORIDE 10 MG: 10 TABLET ORAL at 22:06

## 2019-01-01 RX ADMIN — AMIODARONE HYDROCHLORIDE 0.5 MG/MIN: 1.8 INJECTION, SOLUTION INTRAVENOUS at 12:13

## 2019-01-01 RX ADMIN — IPRATROPIUM BROMIDE 0.5 MG: 0.5 SOLUTION RESPIRATORY (INHALATION) at 21:01

## 2019-01-01 RX ADMIN — SODIUM CHLORIDE, PRESERVATIVE FREE 3 ML: 5 INJECTION INTRAVENOUS at 20:57

## 2019-01-01 RX ADMIN — SODIUM BICARBONATE 100 ML/HR: 84 INJECTION, SOLUTION INTRAVENOUS at 09:15

## 2019-01-01 RX ADMIN — HYDROCORTISONE SODIUM SUCCINATE 25 MG: 100 INJECTION, POWDER, FOR SOLUTION INTRAMUSCULAR; INTRAVENOUS at 20:05

## 2019-01-01 RX ADMIN — PAROXETINE 10 MG: 10 TABLET, FILM COATED ORAL at 09:17

## 2019-01-01 RX ADMIN — BUSPIRONE HYDROCHLORIDE 10 MG: 10 TABLET ORAL at 09:36

## 2019-01-01 RX ADMIN — LISINOPRIL 20 MG: 20 TABLET ORAL at 09:06

## 2019-01-01 RX ADMIN — METOPROLOL TARTRATE 25 MG: 25 TABLET, FILM COATED ORAL at 22:19

## 2019-01-01 RX ADMIN — IPRATROPIUM BROMIDE AND ALBUTEROL SULFATE 3 ML: 2.5; .5 SOLUTION RESPIRATORY (INHALATION) at 19:57

## 2019-01-01 RX ADMIN — SODIUM CHLORIDE, PRESERVATIVE FREE 3 ML: 5 INJECTION INTRAVENOUS at 09:49

## 2019-01-01 RX ADMIN — NOREPINEPHRINE BITARTRATE 0.18 MCG/KG/MIN: 1 INJECTION INTRAVENOUS at 10:19

## 2019-01-01 RX ADMIN — VANCOMYCIN HYDROCHLORIDE 1500 MG: 10 INJECTION, POWDER, LYOPHILIZED, FOR SOLUTION INTRAVENOUS at 16:30

## 2019-01-01 RX ADMIN — ENOXAPARIN SODIUM 40 MG: 40 INJECTION SUBCUTANEOUS at 17:29

## 2019-01-01 RX ADMIN — PAROXETINE 10 MG: 10 TABLET, FILM COATED ORAL at 09:06

## 2019-01-01 RX ADMIN — TAZOBACTAM SODIUM AND PIPERACILLIN SODIUM 4.5 G: 500; 4 INJECTION, SOLUTION INTRAVENOUS at 20:23

## 2019-01-01 RX ADMIN — ARIPIPRAZOLE 5 MG: 5 TABLET ORAL at 09:17

## 2019-01-01 RX ADMIN — METFORMIN HYDROCHLORIDE 500 MG: 500 TABLET ORAL at 17:47

## 2019-01-01 RX ADMIN — BUSPIRONE HYDROCHLORIDE 10 MG: 10 TABLET ORAL at 20:57

## 2019-01-01 RX ADMIN — VANCOMYCIN HYDROCHLORIDE 1500 MG: 10 INJECTION, POWDER, LYOPHILIZED, FOR SOLUTION INTRAVENOUS at 09:21

## 2019-01-01 RX ADMIN — IPRATROPIUM BROMIDE 0.5 MG: 0.5 SOLUTION RESPIRATORY (INHALATION) at 19:00

## 2019-01-01 RX ADMIN — LEVOTHYROXINE SODIUM 12.5 MCG: 25 TABLET ORAL at 05:51

## 2019-01-01 RX ADMIN — BUSPIRONE HYDROCHLORIDE 10 MG: 10 TABLET ORAL at 09:06

## 2019-01-01 RX ADMIN — AMIODARONE HYDROCHLORIDE 200 MG: 200 TABLET ORAL at 22:05

## 2019-01-01 RX ADMIN — METFORMIN HYDROCHLORIDE 500 MG: 500 TABLET ORAL at 17:54

## 2019-01-01 RX ADMIN — HYDROCORTISONE SODIUM SUCCINATE 50 MG: 100 INJECTION, POWDER, FOR SOLUTION INTRAMUSCULAR; INTRAVENOUS at 09:15

## 2019-01-01 RX ADMIN — BUSPIRONE HYDROCHLORIDE 10 MG: 10 TABLET ORAL at 09:17

## 2019-01-01 RX ADMIN — IPRATROPIUM BROMIDE 0.5 MG: 0.5 SOLUTION RESPIRATORY (INHALATION) at 10:07

## 2019-01-01 RX ADMIN — VANCOMYCIN HYDROCHLORIDE 1250 MG: 10 INJECTION, POWDER, LYOPHILIZED, FOR SOLUTION INTRAVENOUS at 17:47

## 2019-01-01 RX ADMIN — AMIODARONE HYDROCHLORIDE 0.5 MG/MIN: 1.8 INJECTION, SOLUTION INTRAVENOUS at 09:21

## 2019-01-01 RX ADMIN — ENOXAPARIN SODIUM 40 MG: 40 INJECTION SUBCUTANEOUS at 17:14

## 2019-01-01 RX ADMIN — BUSPIRONE HYDROCHLORIDE 10 MG: 10 TABLET ORAL at 08:42

## 2019-01-01 RX ADMIN — BUSPIRONE HYDROCHLORIDE 10 MG: 10 TABLET ORAL at 22:19

## 2019-01-01 RX ADMIN — INSULIN LISPRO 4 UNITS: 100 INJECTION, SOLUTION INTRAVENOUS; SUBCUTANEOUS at 17:52

## 2019-01-01 RX ADMIN — INSULIN LISPRO 8 UNITS: 100 INJECTION, SOLUTION INTRAVENOUS; SUBCUTANEOUS at 13:14

## 2019-01-01 RX ADMIN — ENOXAPARIN SODIUM 40 MG: 40 INJECTION SUBCUTANEOUS at 18:45

## 2019-01-01 RX ADMIN — VASOPRESSIN 0.04 UNITS/MIN: 20 INJECTION INTRAVENOUS at 23:42

## 2019-01-01 RX ADMIN — AMIODARONE HYDROCHLORIDE 0.5 MG/MIN: 1.8 INJECTION, SOLUTION INTRAVENOUS at 22:32

## 2019-01-01 RX ADMIN — HYDROCORTISONE SODIUM SUCCINATE 25 MG: 100 INJECTION, POWDER, FOR SOLUTION INTRAMUSCULAR; INTRAVENOUS at 09:21

## 2019-01-01 RX ADMIN — AMIODARONE HYDROCHLORIDE 150 MG: 1.5 INJECTION, SOLUTION INTRAVENOUS at 10:19

## 2019-01-01 RX ADMIN — VANCOMYCIN HYDROCHLORIDE 1000 MG: 1 INJECTION, SOLUTION INTRAVENOUS at 17:54

## 2019-01-01 RX ADMIN — METOPROLOL TARTRATE 25 MG: 25 TABLET, FILM COATED ORAL at 09:06

## 2019-01-01 RX ADMIN — INSULIN LISPRO 4 UNITS: 100 INJECTION, SOLUTION INTRAVENOUS; SUBCUTANEOUS at 06:41

## 2019-01-01 RX ADMIN — HYDROCORTISONE SODIUM SUCCINATE 25 MG: 100 INJECTION, POWDER, FOR SOLUTION INTRAMUSCULAR; INTRAVENOUS at 14:50

## 2019-01-01 RX ADMIN — INSULIN LISPRO 4 UNITS: 100 INJECTION, SOLUTION INTRAVENOUS; SUBCUTANEOUS at 00:01

## 2019-01-01 RX ADMIN — IPRATROPIUM BROMIDE 0.5 MG: 0.5 SOLUTION RESPIRATORY (INHALATION) at 06:51

## 2019-01-01 RX ADMIN — BUSPIRONE HYDROCHLORIDE 10 MG: 10 TABLET ORAL at 21:12

## 2019-01-01 RX ADMIN — LISINOPRIL 20 MG: 20 TABLET ORAL at 09:36

## 2019-01-01 RX ADMIN — SODIUM CHLORIDE, PRESERVATIVE FREE 3 ML: 5 INJECTION INTRAVENOUS at 09:18

## 2019-01-01 RX ADMIN — VANCOMYCIN HYDROCHLORIDE 1000 MG: 1 INJECTION, SOLUTION INTRAVENOUS at 18:28

## 2019-01-01 RX ADMIN — INSULIN LISPRO 8 UNITS: 100 INJECTION, SOLUTION INTRAVENOUS; SUBCUTANEOUS at 12:35

## 2019-01-01 RX ADMIN — BUSPIRONE HYDROCHLORIDE 10 MG: 10 TABLET ORAL at 10:11

## 2019-01-01 RX ADMIN — SODIUM CHLORIDE, PRESERVATIVE FREE 10 ML: 5 INJECTION INTRAVENOUS at 03:49

## 2019-01-01 RX ADMIN — MEROPENEM 1 G: 1 INJECTION, POWDER, FOR SOLUTION INTRAVENOUS at 21:47

## 2019-01-01 RX ADMIN — INSULIN LISPRO 6 UNITS: 100 INJECTION, SOLUTION INTRAVENOUS; SUBCUTANEOUS at 17:29

## 2019-01-01 RX ADMIN — APIXABAN 5 MG: 5 TABLET, FILM COATED ORAL at 09:06

## 2019-01-01 RX ADMIN — IPRATROPIUM BROMIDE AND ALBUTEROL SULFATE 3 ML: 2.5; .5 SOLUTION RESPIRATORY (INHALATION) at 00:30

## 2019-01-01 RX ADMIN — PROPOFOL 15 MCG/KG/MIN: 10 INJECTION, EMULSION INTRAVENOUS at 00:03

## 2019-01-01 RX ADMIN — FAMOTIDINE 20 MG: 10 INJECTION, SOLUTION INTRAVENOUS at 20:08

## 2019-01-01 RX ADMIN — SODIUM CHLORIDE, PRESERVATIVE FREE 3 ML: 5 INJECTION INTRAVENOUS at 21:12

## 2019-01-01 RX ADMIN — NOREPINEPHRINE BITARTRATE 0.2 MCG/KG/MIN: 1 INJECTION INTRAVENOUS at 22:57

## 2019-01-01 RX ADMIN — IPRATROPIUM BROMIDE AND ALBUTEROL SULFATE 3 ML: 2.5; .5 SOLUTION RESPIRATORY (INHALATION) at 06:43

## 2019-01-01 RX ADMIN — LEVOTHYROXINE SODIUM 12.5 MCG: 25 TABLET ORAL at 05:58

## 2019-01-01 RX ADMIN — MEROPENEM 1 G: 1 INJECTION, POWDER, FOR SOLUTION INTRAVENOUS at 06:04

## 2019-01-01 RX ADMIN — PROPOFOL 15 MCG/KG/MIN: 10 INJECTION, EMULSION INTRAVENOUS at 23:43

## 2019-01-01 RX ADMIN — BARIUM SULFATE 20 ML: 400 PASTE ORAL at 15:34

## 2019-01-01 RX ADMIN — AMIODARONE HYDROCHLORIDE 200 MG: 200 TABLET ORAL at 08:42

## 2019-01-01 RX ADMIN — IPRATROPIUM BROMIDE 0.5 MG: 0.5 SOLUTION RESPIRATORY (INHALATION) at 10:56

## 2019-01-01 RX ADMIN — INSULIN LISPRO 4 UNITS: 100 INJECTION, SOLUTION INTRAVENOUS; SUBCUTANEOUS at 09:37

## 2019-01-01 RX ADMIN — AMIODARONE HYDROCHLORIDE 0.5 MG/MIN: 1.8 INJECTION, SOLUTION INTRAVENOUS at 17:29

## 2019-01-01 RX ADMIN — INSULIN LISPRO 8 UNITS: 100 INJECTION, SOLUTION INTRAVENOUS; SUBCUTANEOUS at 06:38

## 2019-01-01 RX ADMIN — EPINEPHRINE 0.1 MG: 0.1 INJECTION, SOLUTION ENDOTRACHEAL; INTRACARDIAC; INTRAVENOUS at 17:10

## 2019-01-01 RX ADMIN — SODIUM CHLORIDE, PRESERVATIVE FREE 3 ML: 5 INJECTION INTRAVENOUS at 09:07

## 2019-01-01 RX ADMIN — POTASSIUM CHLORIDE 40 MEQ: 750 CAPSULE, EXTENDED RELEASE ORAL at 13:14

## 2019-01-01 RX ADMIN — DIAZEPAM 5 MG: 5 TABLET ORAL at 01:02

## 2019-01-01 RX ADMIN — IPRATROPIUM BROMIDE AND ALBUTEROL SULFATE 3 ML: 2.5; .5 SOLUTION RESPIRATORY (INHALATION) at 00:49

## 2019-01-01 RX ADMIN — APIXABAN 5 MG: 5 TABLET, FILM COATED ORAL at 09:36

## 2019-01-01 RX ADMIN — LIDOCAINE HYDROCHLORIDE 30 ML: 20 SOLUTION ORAL; TOPICAL at 05:13

## 2019-01-01 RX ADMIN — METOPROLOL TARTRATE 25 MG: 25 TABLET, FILM COATED ORAL at 22:06

## 2019-01-01 RX ADMIN — VASOPRESSIN 0.04 UNITS/MIN: 20 INJECTION INTRAVENOUS at 17:45

## 2019-01-01 RX ADMIN — LISINOPRIL 20 MG: 20 TABLET ORAL at 08:42

## 2019-01-01 RX ADMIN — IPRATROPIUM BROMIDE AND ALBUTEROL SULFATE 3 ML: 2.5; .5 SOLUTION RESPIRATORY (INHALATION) at 10:39

## 2019-01-01 RX ADMIN — INSULIN LISPRO 8 UNITS: 100 INJECTION, SOLUTION INTRAVENOUS; SUBCUTANEOUS at 17:14

## 2019-01-01 RX ADMIN — INSULIN LISPRO 4 UNITS: 100 INJECTION, SOLUTION INTRAVENOUS; SUBCUTANEOUS at 01:02

## 2019-01-01 RX ADMIN — LEVOTHYROXINE SODIUM 12.5 MCG: 25 TABLET ORAL at 05:52

## 2019-01-01 RX ADMIN — VANCOMYCIN HYDROCHLORIDE 1000 MG: 1 INJECTION, SOLUTION INTRAVENOUS at 09:17

## 2019-01-01 RX ADMIN — INSULIN LISPRO 8 UNITS: 100 INJECTION, SOLUTION INTRAVENOUS; SUBCUTANEOUS at 00:15

## 2019-01-01 RX ADMIN — INSULIN LISPRO 4 UNITS: 100 INJECTION, SOLUTION INTRAVENOUS; SUBCUTANEOUS at 05:52

## 2019-01-01 RX ADMIN — INSULIN LISPRO 8 UNITS: 100 INJECTION, SOLUTION INTRAVENOUS; SUBCUTANEOUS at 13:07

## 2019-01-01 RX ADMIN — PROPOFOL 20 MCG/KG/MIN: 10 INJECTION, EMULSION INTRAVENOUS at 16:20

## 2019-01-01 RX ADMIN — LEVOTHYROXINE SODIUM 12.5 MCG: 25 TABLET ORAL at 05:43

## 2019-01-01 RX ADMIN — POTASSIUM CHLORIDE 40 MEQ: 750 CAPSULE, EXTENDED RELEASE ORAL at 15:59

## 2019-01-01 RX ADMIN — IPRATROPIUM BROMIDE AND ALBUTEROL SULFATE 3 ML: 2.5; .5 SOLUTION RESPIRATORY (INHALATION) at 21:52

## 2019-01-01 RX ADMIN — CEFEPIME HYDROCHLORIDE 2 G: 2 INJECTION, POWDER, FOR SOLUTION INTRAVENOUS at 20:03

## 2019-01-01 RX ADMIN — IPRATROPIUM BROMIDE AND ALBUTEROL SULFATE 3 ML: 2.5; .5 SOLUTION RESPIRATORY (INHALATION) at 15:25

## 2019-01-01 RX ADMIN — VANCOMYCIN HYDROCHLORIDE 1000 MG: 1 INJECTION, SOLUTION INTRAVENOUS at 20:57

## 2019-01-01 RX ADMIN — IPRATROPIUM BROMIDE AND ALBUTEROL SULFATE 3 ML: 2.5; .5 SOLUTION RESPIRATORY (INHALATION) at 04:15

## 2019-01-01 RX ADMIN — METFORMIN HYDROCHLORIDE 500 MG: 500 TABLET ORAL at 09:37

## 2019-01-01 RX ADMIN — MEROPENEM 1 G: 1 INJECTION, POWDER, FOR SOLUTION INTRAVENOUS at 06:39

## 2019-01-01 RX ADMIN — IPRATROPIUM BROMIDE AND ALBUTEROL SULFATE 3 ML: 2.5; .5 SOLUTION RESPIRATORY (INHALATION) at 20:57

## 2019-01-01 RX ADMIN — IPRATROPIUM BROMIDE AND ALBUTEROL SULFATE 3 ML: 2.5; .5 SOLUTION RESPIRATORY (INHALATION) at 11:14

## 2019-01-01 RX ADMIN — VANCOMYCIN HYDROCHLORIDE 1250 MG: 10 INJECTION, POWDER, LYOPHILIZED, FOR SOLUTION INTRAVENOUS at 05:51

## 2019-01-01 RX ADMIN — HYDROCORTISONE SODIUM SUCCINATE 25 MG: 100 INJECTION, POWDER, FOR SOLUTION INTRAMUSCULAR; INTRAVENOUS at 09:39

## 2019-01-01 RX ADMIN — METFORMIN HYDROCHLORIDE 500 MG: 500 TABLET ORAL at 18:28

## 2019-01-01 RX ADMIN — ARIPIPRAZOLE 5 MG: 5 TABLET ORAL at 08:42

## 2019-01-01 RX ADMIN — INSULIN LISPRO 4 UNITS: 100 INJECTION, SOLUTION INTRAVENOUS; SUBCUTANEOUS at 06:15

## 2019-01-01 RX ADMIN — FAMOTIDINE 20 MG: 10 INJECTION, SOLUTION INTRAVENOUS at 09:21

## 2019-01-01 RX ADMIN — SODIUM CHLORIDE, PRESERVATIVE FREE 10 ML: 5 INJECTION INTRAVENOUS at 19:50

## 2019-01-01 RX ADMIN — ENOXAPARIN SODIUM 40 MG: 40 INJECTION SUBCUTANEOUS at 20:23

## 2019-01-01 RX ADMIN — IPRATROPIUM BROMIDE AND ALBUTEROL SULFATE 3 ML: 2.5; .5 SOLUTION RESPIRATORY (INHALATION) at 07:05

## 2019-01-01 RX ADMIN — CLOTRIMAZOLE 10 MG: 10 LOZENGE ORAL; TOPICAL at 18:28

## 2019-01-01 RX ADMIN — VANCOMYCIN HYDROCHLORIDE 1000 MG: 1 INJECTION, SOLUTION INTRAVENOUS at 09:49

## 2019-01-01 RX ADMIN — MEROPENEM 1 G: 1 INJECTION, POWDER, FOR SOLUTION INTRAVENOUS at 05:47

## 2019-01-01 RX ADMIN — CEFEPIME HYDROCHLORIDE 2 G: 2 INJECTION, POWDER, FOR SOLUTION INTRAVENOUS at 13:16

## 2019-01-01 RX ADMIN — INSULIN LISPRO 8 UNITS: 100 INJECTION, SOLUTION INTRAVENOUS; SUBCUTANEOUS at 13:17

## 2019-01-01 RX ADMIN — PERFLUTREN: 6.52 INJECTION, SUSPENSION INTRAVENOUS at 09:08

## 2019-01-01 RX ADMIN — SODIUM CHLORIDE, PRESERVATIVE FREE 3 ML: 5 INJECTION INTRAVENOUS at 09:09

## 2019-01-01 RX ADMIN — LISINOPRIL 20 MG: 20 TABLET ORAL at 13:14

## 2019-01-01 RX ADMIN — ARIPIPRAZOLE 5 MG: 5 TABLET ORAL at 10:10

## 2019-01-01 RX ADMIN — AMIODARONE HYDROCHLORIDE 0.5 MG/MIN: 1.8 INJECTION, SOLUTION INTRAVENOUS at 10:19

## 2019-01-01 RX ADMIN — SODIUM CHLORIDE, PRESERVATIVE FREE 3 ML: 5 INJECTION INTRAVENOUS at 21:05

## 2019-01-01 RX ADMIN — APIXABAN 5 MG: 5 TABLET, FILM COATED ORAL at 08:42

## 2019-01-01 RX ADMIN — IPRATROPIUM BROMIDE AND ALBUTEROL SULFATE 3 ML: 2.5; .5 SOLUTION RESPIRATORY (INHALATION) at 14:30

## 2019-01-01 RX ADMIN — IPRATROPIUM BROMIDE 0.5 MG: 0.5 SOLUTION RESPIRATORY (INHALATION) at 11:11

## 2019-01-01 RX ADMIN — HYDROCORTISONE SODIUM SUCCINATE 25 MG: 100 INJECTION, POWDER, FOR SOLUTION INTRAMUSCULAR; INTRAVENOUS at 03:27

## 2019-01-01 RX ADMIN — POTASSIUM CHLORIDE 40 MEQ: 750 CAPSULE, EXTENDED RELEASE ORAL at 09:16

## 2019-01-01 RX ADMIN — APIXABAN 5 MG: 5 TABLET, FILM COATED ORAL at 20:40

## 2019-01-01 RX ADMIN — VANCOMYCIN HYDROCHLORIDE 1500 MG: 10 INJECTION, POWDER, LYOPHILIZED, FOR SOLUTION INTRAVENOUS at 03:34

## 2019-01-01 RX ADMIN — AMIODARONE HYDROCHLORIDE 0.5 MG/MIN: 1.8 INJECTION, SOLUTION INTRAVENOUS at 04:05

## 2019-01-01 RX ADMIN — MEROPENEM 1 G: 1 INJECTION, POWDER, FOR SOLUTION INTRAVENOUS at 17:28

## 2019-01-01 RX ADMIN — IPRATROPIUM BROMIDE 0.5 MG: 0.5 SOLUTION RESPIRATORY (INHALATION) at 14:05

## 2019-01-01 RX ADMIN — IPRATROPIUM BROMIDE AND ALBUTEROL SULFATE 3 ML: 2.5; .5 SOLUTION RESPIRATORY (INHALATION) at 20:29

## 2019-01-01 RX ADMIN — CLOTRIMAZOLE 10 MG: 10 LOZENGE ORAL; TOPICAL at 08:42

## 2019-01-01 RX ADMIN — VANCOMYCIN HYDROCHLORIDE 1000 MG: 1 INJECTION, SOLUTION INTRAVENOUS at 23:17

## 2019-01-01 RX ADMIN — IPRATROPIUM BROMIDE 0.5 MG: 0.5 SOLUTION RESPIRATORY (INHALATION) at 13:57

## 2019-01-01 RX ADMIN — ACETAMINOPHEN 650 MG: 325 TABLET, FILM COATED ORAL at 16:19

## 2019-01-01 RX ADMIN — Medication 50 MEQ: at 06:42

## 2019-01-01 RX ADMIN — VASOPRESSIN 0.04 UNITS/MIN: 20 INJECTION INTRAVENOUS at 06:05

## 2019-01-01 RX ADMIN — BARIUM SULFATE 250 ML: 400 SUSPENSION ORAL at 15:15

## 2019-01-01 RX ADMIN — INSULIN LISPRO 4 UNITS: 100 INJECTION, SOLUTION INTRAVENOUS; SUBCUTANEOUS at 05:42

## 2019-01-01 RX ADMIN — FAMOTIDINE 20 MG: 10 INJECTION, SOLUTION INTRAVENOUS at 09:40

## 2019-01-01 RX ADMIN — SODIUM CHLORIDE, PRESERVATIVE FREE 3 ML: 5 INJECTION INTRAVENOUS at 08:43

## 2019-01-01 RX ADMIN — HYDROCORTISONE SODIUM SUCCINATE 25 MG: 100 INJECTION, POWDER, FOR SOLUTION INTRAMUSCULAR; INTRAVENOUS at 17:29

## 2019-01-01 RX ADMIN — ACETAMINOPHEN 650 MG: 325 TABLET, FILM COATED ORAL at 14:58

## 2019-01-01 RX ADMIN — NOREPINEPHRINE BITARTRATE 0.08 MCG/KG/MIN: 1 INJECTION INTRAVENOUS at 23:57

## 2019-01-01 RX ADMIN — Medication 50 MEQ: at 06:43

## 2019-01-01 RX ADMIN — APIXABAN 5 MG: 5 TABLET, FILM COATED ORAL at 17:52

## 2019-01-01 RX ADMIN — CLOTRIMAZOLE 10 MG: 10 LOZENGE ORAL; TOPICAL at 13:08

## 2019-01-01 RX ADMIN — IPRATROPIUM BROMIDE AND ALBUTEROL SULFATE 3 ML: 2.5; .5 SOLUTION RESPIRATORY (INHALATION) at 14:44

## 2019-01-01 RX ADMIN — ACETAMINOPHEN 650 MG: 325 TABLET, FILM COATED ORAL at 23:53

## 2019-01-01 RX ADMIN — POTASSIUM CHLORIDE 40 MEQ: 750 CAPSULE, EXTENDED RELEASE ORAL at 17:52

## 2019-01-01 RX ADMIN — SODIUM CHLORIDE, PRESERVATIVE FREE 3 ML: 5 INJECTION INTRAVENOUS at 22:19

## 2019-01-01 RX ADMIN — AMIODARONE HYDROCHLORIDE 200 MG: 200 TABLET ORAL at 20:40

## 2019-01-01 RX ADMIN — SODIUM BICARBONATE 50 MEQ: 84 INJECTION, SOLUTION INTRAVENOUS at 17:08

## 2019-01-01 RX ADMIN — VANCOMYCIN HYDROCHLORIDE 1000 MG: 1 INJECTION, SOLUTION INTRAVENOUS at 20:03

## 2019-01-01 RX ADMIN — AMIODARONE HYDROCHLORIDE 200 MG: 200 TABLET ORAL at 20:03

## 2019-01-01 RX ADMIN — METFORMIN HYDROCHLORIDE 500 MG: 500 TABLET ORAL at 08:42

## 2019-01-01 RX ADMIN — IPRATROPIUM BROMIDE 0.5 MG: 0.5 SOLUTION RESPIRATORY (INHALATION) at 07:32

## 2019-01-01 RX ADMIN — VANCOMYCIN HYDROCHLORIDE 1000 MG: 1 INJECTION, SOLUTION INTRAVENOUS at 01:02

## 2019-01-01 RX ADMIN — APIXABAN 5 MG: 5 TABLET, FILM COATED ORAL at 21:12

## 2019-01-01 RX ADMIN — IPRATROPIUM BROMIDE 0.5 MG: 0.5 SOLUTION RESPIRATORY (INHALATION) at 21:00

## 2019-01-01 RX ADMIN — VASOPRESSIN 0.04 UNITS/MIN: 20 INJECTION INTRAVENOUS at 05:47

## 2019-01-01 RX ADMIN — METOPROLOL TARTRATE 25 MG: 25 TABLET, FILM COATED ORAL at 20:40

## 2019-01-01 RX ADMIN — LEVOTHYROXINE SODIUM 12.5 MCG: 25 TABLET ORAL at 05:46

## 2019-01-01 RX ADMIN — METOPROLOL TARTRATE 25 MG: 25 TABLET, FILM COATED ORAL at 20:57

## 2019-01-01 RX ADMIN — NOREPINEPHRINE BITARTRATE 0.02 MCG/KG/MIN: 1 INJECTION INTRAVENOUS at 16:26

## 2019-01-01 RX ADMIN — BUSPIRONE HYDROCHLORIDE 10 MG: 10 TABLET ORAL at 20:40

## 2019-01-01 RX ADMIN — HYDROCORTISONE SODIUM SUCCINATE 50 MG: 100 INJECTION, POWDER, FOR SOLUTION INTRAMUSCULAR; INTRAVENOUS at 20:23

## 2019-01-01 RX ADMIN — INSULIN LISPRO 12 UNITS: 100 INJECTION, SOLUTION INTRAVENOUS; SUBCUTANEOUS at 14:57

## 2019-01-01 RX ADMIN — IPRATROPIUM BROMIDE 0.5 MG: 0.5 SOLUTION RESPIRATORY (INHALATION) at 07:51

## 2019-01-01 RX ADMIN — METOPROLOL TARTRATE 25 MG: 25 TABLET, FILM COATED ORAL at 09:37

## 2019-01-01 RX ADMIN — SODIUM CHLORIDE, PRESERVATIVE FREE 3 ML: 5 INJECTION INTRAVENOUS at 22:08

## 2019-01-01 RX ADMIN — VANCOMYCIN HYDROCHLORIDE 1250 MG: 10 INJECTION, POWDER, LYOPHILIZED, FOR SOLUTION INTRAVENOUS at 05:46

## 2019-01-01 RX ADMIN — IPRATROPIUM BROMIDE AND ALBUTEROL SULFATE 3 ML: 2.5; .5 SOLUTION RESPIRATORY (INHALATION) at 07:37

## 2019-01-01 RX ADMIN — PAROXETINE 10 MG: 10 TABLET, FILM COATED ORAL at 10:10

## 2019-01-01 RX ADMIN — INSULIN LISPRO 8 UNITS: 100 INJECTION, SOLUTION INTRAVENOUS; SUBCUTANEOUS at 21:03

## 2019-01-01 RX ADMIN — EPINEPHRINE 1 MG: 0.1 INJECTION, SOLUTION ENDOTRACHEAL; INTRACARDIAC; INTRAVENOUS at 17:04

## 2019-01-01 RX ADMIN — DILTIAZEM HYDROCHLORIDE 15 MG/HR: 5 INJECTION INTRAVENOUS at 16:21

## 2019-01-01 RX ADMIN — AMIODARONE HYDROCHLORIDE 200 MG: 200 TABLET ORAL at 21:11

## 2019-01-01 RX ADMIN — VASOPRESSIN 0.04 UNITS/MIN: 20 INJECTION INTRAVENOUS at 23:00

## 2019-01-01 RX ADMIN — CLOTRIMAZOLE 10 MG: 10 LOZENGE ORAL; TOPICAL at 22:53

## 2019-01-01 RX ADMIN — SODIUM CHLORIDE, PRESERVATIVE FREE 3 ML: 5 INJECTION INTRAVENOUS at 20:24

## 2019-01-01 RX ADMIN — INSULIN LISPRO 20 UNITS: 100 INJECTION, SOLUTION INTRAVENOUS; SUBCUTANEOUS at 00:30

## 2019-01-01 RX ADMIN — AMIODARONE HYDROCHLORIDE 200 MG: 200 TABLET ORAL at 22:19

## 2019-01-01 RX ADMIN — POTASSIUM CHLORIDE 40 MEQ: 750 CAPSULE, EXTENDED RELEASE ORAL at 12:35

## 2019-01-01 RX ADMIN — SODIUM CHLORIDE, PRESERVATIVE FREE 3 ML: 5 INJECTION INTRAVENOUS at 20:06

## 2019-01-01 RX ADMIN — APIXABAN 5 MG: 5 TABLET, FILM COATED ORAL at 22:18

## 2019-01-01 RX ADMIN — VASOPRESSIN 0.04 UNITS/MIN: 20 INJECTION INTRAVENOUS at 16:22

## 2019-01-01 RX ADMIN — EPINEPHRINE 1 MG: 0.1 INJECTION, SOLUTION ENDOTRACHEAL; INTRACARDIAC; INTRAVENOUS at 17:07

## 2019-01-01 RX ADMIN — IPRATROPIUM BROMIDE 0.5 MG: 0.5 SOLUTION RESPIRATORY (INHALATION) at 06:13

## 2019-01-01 RX ADMIN — BARIUM SULFATE 55 ML: 0.81 POWDER, FOR SUSPENSION ORAL at 15:15

## 2019-01-01 RX ADMIN — AMIODARONE HYDROCHLORIDE 200 MG: 200 TABLET ORAL at 20:57

## 2019-01-01 RX ADMIN — IPRATROPIUM BROMIDE 0.5 MG: 0.5 SOLUTION RESPIRATORY (INHALATION) at 14:07

## 2019-01-01 RX ADMIN — IPRATROPIUM BROMIDE 0.5 MG: 0.5 SOLUTION RESPIRATORY (INHALATION) at 06:10

## 2019-01-01 RX ADMIN — METOPROLOL TARTRATE 25 MG: 25 TABLET, FILM COATED ORAL at 21:11

## 2019-01-01 RX ADMIN — AMIODARONE HYDROCHLORIDE 200 MG: 200 TABLET ORAL at 09:37

## 2019-01-01 RX ADMIN — IPRATROPIUM BROMIDE AND ALBUTEROL SULFATE 3 ML: 2.5; .5 SOLUTION RESPIRATORY (INHALATION) at 03:57

## 2019-01-01 RX ADMIN — PROPOFOL 15 MCG/KG/MIN: 10 INJECTION, EMULSION INTRAVENOUS at 12:12

## 2019-01-01 RX ADMIN — AMIODARONE HYDROCHLORIDE 200 MG: 200 TABLET ORAL at 15:13

## 2019-01-01 RX ADMIN — ONDANSETRON HYDROCHLORIDE 4 MG: 2 SOLUTION INTRAMUSCULAR; INTRAVENOUS at 06:47

## 2019-01-01 RX ADMIN — IPRATROPIUM BROMIDE 0.5 MG: 0.5 SOLUTION RESPIRATORY (INHALATION) at 10:09

## 2019-01-01 RX ADMIN — IPRATROPIUM BROMIDE AND ALBUTEROL SULFATE 3 ML: 2.5; .5 SOLUTION RESPIRATORY (INHALATION) at 10:25

## 2019-01-01 RX ADMIN — PAROXETINE 10 MG: 10 TABLET, FILM COATED ORAL at 09:36

## 2019-01-01 RX ADMIN — IPRATROPIUM BROMIDE AND ALBUTEROL SULFATE 3 ML: 2.5; .5 SOLUTION RESPIRATORY (INHALATION) at 04:22

## 2019-01-01 RX ADMIN — ACETAMINOPHEN 650 MG: 325 TABLET, FILM COATED ORAL at 08:43

## 2019-04-01 PROBLEM — A41.9 SEPSIS (HCC): Status: ACTIVE | Noted: 2019-01-01

## 2019-04-01 NOTE — PROGRESS NOTES
Pharmacy Dosing Service  Antimicrobials  Meropenem    Assessment/Action/Plan:  Pharmacy to dose Meropenem for PNA/sepsis.   Discussed with physician, proceed with duplicate coverage Zosyn and Merrem at this time, will re-evaluate tomorrow  Initiating Meropenem 1 g IV once over 30 minutes, followed by 1 g IV Q12H for 7 days  Pharmacy will continue to follow  Current end date: 4/9/19      Subjective:  Vitaly Pantoja is a 59 y.o. male currently being treated for pneumonia/sepsis.    Objective:  Estimated Creatinine Clearance: 33.3 mL/min (A) (by C-G formula based on SCr of 2.32 mg/dL (H)).   Lab Results   Component Value Date    CREATININE 2.32 (H) 04/01/2019    CREATININE 0.73 10/10/2018    CREATININE 0.62 02/02/2018     Lab Results   Component Value Date    WBC 26.16 (H) 04/01/2019     Temp Readings from Last 1 Encounters:   10/10/18 98 °F (36.7 °C)       Culture Results:  Microbiology Results (last 10 days)       ** No results found for the last 240 hours. **          Current Antimicrobials:   Anti-Infectives (From admission, onward)      Ordered     Dose/Rate Route Frequency Start Stop    04/01/19 1717  vancomycin 1500 mg/500 mL 0.9% NS IVPB ([facility])     Ordering Provider:  Jadon Burnett MD    1,500 mg  over 180 Minutes Intravenous Every 36 Hours 04/03/19 0430 04/09/19 0429    04/01/19 1741  meropenem (MERREM) 1 g/100 mL 0.9% NS VTB ([facility])     Ordering Provider:  Jadon Burnett MD    1 g  over 3 Hours Intravenous Every 12 Hours 04/02/19 0630 04/09/19 0629    04/01/19 1608  piperacillin-tazobactam (ZOSYN) 4.5 g in iso-osmotic dextrose 100 mL IVPB (premix)     Ordering Provider:  Jadon Burnett MD    4.5 g  over 4 Hours Intravenous Every 8 Hours 04/01/19 2300 04/11/19 2259    04/01/19 1741  meropenem (MERREM) 1 g/100 mL 0.9% NS VTB ([facility])     Ordering Provider:  Jadon Burnett MD    1 g  over 30 Minutes Intravenous Once 04/01/19 1830      04/01/19 4759  Pharmacy to Dose meropenem (MERREM)      Ordering Provider:  Jadon Burnett MD     Does not apply Continuous PRN 04/01/19 1727 04/08/19 1726    04/01/19 1608  piperacillin-tazobactam (ZOSYN) 4.5 g in iso-osmotic dextrose 100 mL IVPB (premix)     Ordering Provider:  Jadon Burnett MD    4.5 g  over 30 Minutes Intravenous Once 04/01/19 1700      04/01/19 1616  vancomycin 1500 mg/500 mL 0.9% NS IVPB ([facility])     Ordering Provider:  Jadon Burnett MD    20 mg/kg × 79.6 kg  over 180 Minutes Intravenous Once 04/01/19 1700              Clement Trinh, PharmD  04/01/19 5:59 PM

## 2019-04-01 NOTE — PROGRESS NOTES
"Pharmacy Dosing Service  Pharmacokinetics  Vancomycin Initial Evaluation    Assessment/Action/Plan:  Initiated Vancomycin 1500 mg IVPB every 36 hours. Vancomycin levels not ordered at this time..  Current vancomycin end date: 04/08/2019. Pharmacy will monitor renal function and adjust dose accordingly.     Subjective:  Vitaly Pantoja is a 59 y.o. male with a Vancomycin \"Pharmacy to Dose\" consult for the treatment of Pneumonia/SSTI/Sepsis .    Objective:  Ht:  ; Wt: 79.5 kg (175 lb 6 oz)  Estimated Creatinine Clearance: 33.3 mL/min (A) (by C-G formula based on SCr of 2.32 mg/dL (H)).   Lab Results   Component Value Date    CREATININE 2.32 (H) 04/01/2019    CREATININE 0.73 10/10/2018    CREATININE 0.62 02/02/2018      Lab Results   Component Value Date    WBC 26.16 (H) 04/01/2019    WBC 8.12 10/10/2018    WBC 6.70 02/02/2018      Baseline culture results:  Microbiology Results (last 10 days)       ** No results found for the last 240 hours. **            Janis Sky, PharmD  04/01/19 5:19 PM    "

## 2019-04-02 NOTE — PLAN OF CARE
Problem: Patient Care Overview  Goal: Plan of Care Review  Outcome: Ongoing (interventions implemented as appropriate)   04/02/19 0523   Coping/Psychosocial   Plan of Care Reviewed With patient   Plan of Care Review   Progress no change   OTHER   Outcome Summary pt remains on levophed and vasopressin, afebrile, no signs of pain. cardizem added for AFIB RVR.      Goal: Individualization and Mutuality  Outcome: Ongoing (interventions implemented as appropriate)    Goal: Discharge Needs Assessment  Outcome: Ongoing (interventions implemented as appropriate)    Goal: Interprofessional Rounds/Family Conf  Outcome: Ongoing (interventions implemented as appropriate)      Problem: Skin Injury Risk (Adult)  Goal: Identify Related Risk Factors and Signs and Symptoms  Outcome: Ongoing (interventions implemented as appropriate)    Goal: Skin Health and Integrity  Outcome: Ongoing (interventions implemented as appropriate)      Problem: Fall Risk (Adult)  Goal: Identify Related Risk Factors and Signs and Symptoms  Outcome: Ongoing (interventions implemented as appropriate)    Goal: Absence of Fall  Outcome: Ongoing (interventions implemented as appropriate)      Problem: Restraint, Nonbehavioral (Nonviolent)  Goal: Rationale and Justification  Outcome: Ongoing (interventions implemented as appropriate)    Goal: Nonbehavioral (Nonviolent) Restraint: Absence of Injury/Harm  Outcome: Ongoing (interventions implemented as appropriate)    Goal: Nonbehavioral (Nonviolent) Restraint: Achievement of Discontinuation Criteria  Outcome: Ongoing (interventions implemented as appropriate)    Goal: Nonbehavioral (Nonviolent) Restraint: Preservation of Dignity and Wellbeing  Outcome: Ongoing (interventions implemented as appropriate)

## 2019-04-02 NOTE — PROGRESS NOTES
[facility] Medicine Services  INPATIENT PROGRESS NOTE    Patient Name: Vitaly Pantoja  Date of Admission: 4/1/2019  Today's Date: 04/02/19  Length of Stay: 1  Primary Care Physician: Emerson Sandhu MD    Subjective   Chief Complaint: shortness of air  HPI     Patient seen and examined at bedside.  Patient afebrile.  Remains hemodynamically unstable and on pressors.  Intermittent atrial fibrillation with RVR.    Were able to get ahold of family, indicates the patient functions at approximately an 8th grade level.  Requires assistance with many ADLs.  Patient can be very difficult to deal when agitated.      Review of Systems   Unable to perform ROS: Intubated        All pertinent negatives and positives are as above. All other systems have been reviewed and are negative unless otherwise stated.     Objective    Temp:  [98.8 °F (37.1 °C)-99.9 °F (37.7 °C)] 98.8 °F (37.1 °C)  Heart Rate:  [] 132  Resp:  [15-31] 17  BP: ()/(41-97) 117/69  Arterial Line BP: ()/(48-92) 101/50  FiO2 (%):  [50 %-80 %] 50 %  Physical Exam   Constitutional: Vital signs are normal. He appears ill. No distress. He is sedated, intubated and restrained.   HENT:   Head: Normocephalic and atraumatic.   Mouth/Throat: Oropharynx is clear and moist and mucous membranes are normal.   Eyes: Conjunctivae are normal. No scleral icterus.   Neck: Neck supple. No JVD present.   Cardiovascular: Intact distal pulses and normal pulses. An irregularly irregular rhythm present. Tachycardia present.   Murmur heard.  Pulmonary/Chest: Tachypnea noted. He is intubated. He has wheezes. He has rhonchi.   mechanically ventilated   Abdominal: Soft. Bowel sounds are normal. He exhibits no distension and no mass. There is no tenderness. There is no guarding.   Musculoskeletal: He exhibits no edema.        Neurological: He is alert.   Moves all extremities randomly, not consistently following commands for me    Skin: Skin is warm and dry. He is not diaphoretic. No erythema.   Nursing note and vitals reviewed.          Results Review:  I have reviewed the labs, radiology results, and diagnostic studies.    Laboratory Data:   Results from last 7 days   Lab Units 04/02/19  0236 04/01/19  1625   WBC 10*3/mm3 32.39* 26.16*   HEMOGLOBIN g/dL 11.7* 10.1*   HEMATOCRIT % 35.0* 30.1*   PLATELETS 10*3/mm3 267 195        Results from last 7 days   Lab Units 04/02/19  0236 04/01/19  1625   SODIUM mmol/L 135 129*   POTASSIUM mmol/L 5.6* 5.2   CHLORIDE mmol/L 101 97*   CO2 mmol/L 15.0* 20.0*   BUN mg/dL 60* 59*   CREATININE mg/dL 2.45* 2.32*   CALCIUM mg/dL 7.0* 7.2*   BILIRUBIN mg/dL 1.2* 1.1*   ALK PHOS U/L 223* 149*   ALT (SGPT) U/L 67* 26   AST (SGOT) U/L 158* 43   GLUCOSE mg/dL 213* 128*       Culture Data:   [unfilled]    Radiology Data:   Imaging Results (last 24 hours)     Procedure Component Value Units Date/Time    XR Chest 1 View [[id number]] Collected:  04/02/19 0750     Updated:  04/02/19 0756    Narrative:       EXAMINATION: XR CHEST 1 VW- 4/2/2019 7:50 AM CDT     HISTORY: Intubated; A41.9-Sepsis, unspecified organism; R65.21-Severe  sepsis with septic shock; I46.9-Cardiac arrest, cause unspecified.     REPORT: Comparison is made with the chest x-ray 04/01/2019 1721 hours.     The endotracheal tube is slightly deeper, with the tip at the aki  directed toward the right mainstem bronchus. Consider retraction of the  tube about 2 cm. The right internal jugular central line projects over  the cavoatrial junction as before, a nasogastric tube has been inserted,  tube reaches the stomach. The lungs are hypoaerated, there is central  vascular congestion with normal heart size. Perihilar infiltrates  present, greater on the left. This is mildly improved. No pneumothorax.       Impression:       Interval insertion of a nasogastric tube which reaches the  stomach. The endotracheal tube is slightly deeper than yesterday,  with  the tip at the aki, directed toward the right main stem bronchus.  Consider retraction of the tube about 2 cm. The lungs remain  hypoaerated, there is mild improvement in dense perihilar and left upper  lobe infiltrates.  This report was finalized on 04/02/2019 07:52 by Dr. Catrachito Hobson MD.    XR Abdomen KUB [[id number]] Collected:  04/01/19 1750     Updated:  04/01/19 1754    Narrative:       EXAMINATION: KUB radiograph 04/01/2019     HISTORY: NG tube placement     FINDINGS: KUB radiograph reveals an NG tube has been successfully  advanced into the stomach with the tip in the distal body/antrum. The  tube is well-positioned and ready for use.  This report was finalized on 04/01/2019 17:50 by Dr. Jorje Talavera MD.    XR Chest 1 View [[id number]] Collected:  04/01/19 1749     Updated:  04/01/19 1753    Narrative:       EXAMINATION: Chest one view 04/01/2019     HISTORY: Intubation postarrest     FINDINGS: Today's exam is compared to previous study of earlier today.  There is worsening pulmonary venous hypertension with developing  interstitial and alveolar perihilar pulmonary edema. A small left  effusion is suspected. There is no pneumothorax. Endotracheal tube has  been placed with the tip approximately 1.5 cm above the aki. A right  IJ deep line remains in place with the tip in the right atrium.       Impression:       1.. Endotracheal tube placed with the tip approximately 1.5 cm above the  aki. A right IJ deep line remains in place.  2. Worsening pulmonary venous hypertension with developing perihilar  interstitial and alveolar pulmonary edema.  This report was finalized on 04/01/2019 17:50 by Dr. Jorje Talavera MD.    XR Chest 1 View [[id number]] Collected:  04/01/19 1657     Updated:  04/01/19 1706    Narrative:       XR CHEST 1 VW- 4/1/2019 4:56 PM CDT     HISTORY: central line tube placement       COMPARISON: Chest x-ray dated 10/10/2018.     FINDINGS:   There are 2 areas of  consolidation in the left lung base with partial  silhouetting of the left hemidiaphragm. Lungs are otherwise clear. No  pleural effusion. Heart is normal in size. Pulmonary vasculature are  unremarkable. There is a right IJ central line with tip projecting deep  within the right atrium.     The osseous structures and surrounding soft tissues demonstrate no acute  abnormality.       Impression:       1. Right IJ central line projects deep within the right atrium, possibly  near the valve. Consider withdrawing slightly.   2. Areas of consolidation in the left base may reflect pneumonia.        This report was finalized on 04/01/2019 17:03 by Dr Pop Munguia, .          I have reviewed the patient's current medications.     Assessment/Plan     Active Hospital Problems    Diagnosis   • Sepsis (CMS/[facility])     Assessment:  1.  Septic shock due to pneumonia requiring vasopressors  2.  PEA arrest s/p return of spontaneous circulation  3.  New onset atrial fibrillation with RVR   4.  Acute kidney injury due to sepsis  5.  Suspected systolic heart failure  6.  Anion gap metabolic acidosis due to septic shock   7.  Bacteremia from beta hemolytic streptococcus  8.  Elevated LFTs  9.  Hyperkalemia and hyperphosphotemia  10.  Hypercoagulable with elevated INR due to sepsis  11. Severe leukocytosis  12.  Grade II diastolic dysfunction       Plan:  1.  Continue vancomycin and merropenem - D/C zosyn   2.  Bicarb gtt  3.  Diltiaem gtt   4.  Start amiodarone gtt with bolus   5.  Wound care consult - Dr. Griggs for evaluation and care of foot wounds  6.  Cultures NGTD here; we will request records from [facility] to see if the cultures are growing anything currently  7.  Continue IV fluids  8.  Echo reviewed  9.  Propofol for sedation  10.  CT chest pending  11. Duplex lower extremities pending - Cr too elevated for CTA Chest, not stable enough for V/Q scan - Overall low suspicion  12.  Famotidine for GI PPx  13.  Narrow down antibiotics once we  get sensitivities from [facility].  Requested and reviewed records today.  14.  Reduced stress dose steroids to 25 mg q6h solucortef, will continue to reduce    Labs personally reviewed: Hyperphosphotemia.  CARLOS, stable.  Elevated LFTS and hyperkalemia and hypercoagulabe    Radiology/Diagnostic imaging personally reviewed:  CXR personally reviewed, line in place.  ET Tube almost right main stem will retract back 2 cm.  Diffuse bilaterally pulmonary infiltrates perihilar, overall stable.                  Discharge Planning: I expect the patient to be discharged to ? in ? days.    Jadon Burnett MD   04/02/19   8:32 AM

## 2019-04-02 NOTE — CODE DOCUMENTATION
[facility] Medicine CODE Note       Date of Encounter: 4/1/19    Attending Physician: Lex    Description of Events: Philly RN and I were in patients room after completing central venous line.  Patient was able to answer questions and wake up and respond to commands, but very lethargic.  Was setting up to place arterial line due to hemodynamic instability and awaiting ABG results.  O2 sat was 89% and patient breathing normally.  While setting up for procedure, patient audibly sighed and set his head back and went apneic and subsequently lost a pulse.  Chest compressions were immediately started and patient was subsequently intubated.  Epinephrine was given immediately followed by an amp of bicarb.  On following pulse check ROSC was obtained.  Patient reamined hypotensive and bradycardic, gave 0.1 mg epinephrine push with good response.  Stat labs and ECg were obtained.  Stat CXR was obtained to confirm tube placement.    Outcome: Return of spontaneous ciruclation      Jadon Burnett MD  4/1/2019  8:41 PM

## 2019-04-02 NOTE — H&P
[facility] Medicine Services  HISTORY AND PHYSICAL    Date of Admission: 4/1/2019  Primary Care Physician: Emerson Sandhu MD    Subjective     Chief Complaint: shortness of breath, fall    History of Present Illness    Mr. Pantoja is a 60 yo M presenting as a transfer from OSH.  History obtained from record and discussion with OSH.  Patient was unable to participate in H&P significantly as was very lethargic.Patient presented with shortness of breath.  Patient was found to be hypotensive and had a pneumonia.  Patient went into atrial fibrillation with RVR.    At OSH patient was given ceftriaxone 1 gm and azithromycin 500 mg.  Patient was started on cardizem gtt for his atrial fibrillation and was also given 1 mg/kg lovenorx.  Upon presentation there, T 100.6 and 90/53.     Per OSH patient present the patient was found on the floor in his room while checking on him and found on the floor next to his bed.      Patient was given 30 cc/kg bolus at OSH      Review of Systems   Unable to perform ROS: Patient unresponsive        Otherwise complete ROS reviewed and negative except as mentioned in the HPI.    Past Medical History:   Past Medical History:   Diagnosis Date   • Back pain     current back pain per pt   • Chronic rheumatic arthritis (CMS/HCC)    • Diabetes mellitus (CMS/HCC)    • Hip dysplasia, congenital      Past Surgical History:  Past Surgical History:   Procedure Laterality Date   • APPENDECTOMY     • JOINT REPLACEMENT Bilateral 2015    TKA   • TONSILLECTOMY       Social History:      Family History: Alcoholism, hypertension    Allergies:  No Known Allergies  Medications:  Prior to Admission medications    Medication Sig Start Date End Date Taking? Authorizing Provider   acetaminophen-codeine (TYLENOL with CODEINE #4) 300-60 MG per tablet Take 1 tablet by mouth Every 4 (Four) Hours As Needed for Moderate Pain .    Provider, MD Maxwell   cyclobenzaprine  (FLEXERIL) 10 MG tablet Take 1 tablet by mouth 3 (Three) Times a Day As Needed for Muscle Spasms. 10/10/18   Victorina Hanson APRN   diazePAM (VALIUM) 5 MG tablet Take 5 mg by mouth Every 12 (Twelve) Hours As Needed for Anxiety.    Provider, MD Maxwell   lisinopril (PRINIVIL,ZESTRIL) 40 MG tablet Take 40 mg by mouth Daily.    Provider, MD Maxwell   MethylPREDNISolone (MEDROL, PRIYA,) 4 MG tablet Take as directed on package instructions. 10/10/18   Victorina Hanson APRN     Objective     Vital Signs: BP 91/73   Pulse 109   Wt 79.5 kg (175 lb 6 oz)   SpO2 93%   BMI 29.18 kg/m²   Physical Exam   Constitutional: He is oriented to person, place, and time. He appears lethargic. He appears distressed.   HENT:   Head: Normocephalic and atraumatic.   Eyes: Conjunctivae are normal. No scleral icterus.   Neck: Neck supple. JVD present.   Cardiovascular: Regular rhythm, normal heart sounds and intact distal pulses. Exam reveals no friction rub.   No murmur heard.  tachycardia   Pulmonary/Chest: He has wheezes. He has rales.   Rhonchi   Abdominal: Soft. Bowel sounds are normal. He exhibits no distension. There is no tenderness. There is no guarding.   Musculoskeletal: He exhibits no edema.   Neurological: He is oriented to person, place, and time. He appears lethargic.   Skin: Skin is warm. No rash noted. He is diaphoretic. No erythema.   Psychiatric: He has a normal mood and affect. His behavior is normal.   Nursing note and vitals reviewed.          Results Reviewed:  Lab Results (last 24 hours)     Procedure Component Value Units Date/Time    Respiratory Culture - Sputum, Cough [[id number]] Collected:  04/01/19 1745    Specimen:  Sputum from Cough Updated:  04/01/19 1753    TSH [[id number]]  (Normal) Collected:  04/01/19 1625    Specimen:  Blood Updated:  04/01/19 1733     TSH 0.836 mIU/mL     Procalcitonin [[id number]]  (Abnormal) Collected:  04/01/19 1625    Specimen:  Blood Updated:  04/01/19 1720      Procalcitonin 8.04 ng/mL     Narrative:       SIRS, sepsis, severe sepsis, and septic shock are categorized according to the criteria of the consensus conference of the American College of Chest Physicians/Society of Critical Care Medicine.    PCT < 0.5 ng/mL     Systemic infection (sepsis) is not likely.    PCT >0.5 and < 2.0 ng/mL Systemic infection (sepsis) is possible, but other conditions are known to elevate PCT as well.    PCT > 2.0 ng/mL     Systemic infection (sepsis) is likely, unless other causes are known.      PCT > 10.0 ng/mL    Important systemic inflammatory response, almost exclusively due to severe bacterial sepsis or septic shock.    PCT values of < 0.5 ng/mL do not exclude an infection, because localized infections (without systemic signs) may be associated with such low concentrations, or a systemic infection in its initial stages (<6 hours).  Increased PCT can occur without infection.  PCT concentrations between 0.5 and 2.0 ng/mL should be interpreted taking into account the patients history.  It is recommended to retest PCT within 6-24 hours if any concentrations < 2.0 ng/mL are obtained.    Manual Differential [[id number]]  (Abnormal) Collected:  04/01/19 1625    Specimen:  Blood Updated:  04/01/19 1716     Neutrophil % 96.0 %      Lymphocyte % 1.0 %      Monocyte % 3.0 %      Neutrophils Absolute 25.11 10*3/mm3      Lymphocytes Absolute 0.26 10*3/mm3      Monocytes Absolute 0.78 10*3/mm3      Anisocytosis Mod/2+     Microcytes Slight/1+     Poikilocytes Slight/1+     Polychromasia Mod/2+     WBC Morphology Normal     Giant Platelets Slight/1+    CBC Auto Differential [[id number]]  (Abnormal) Collected:  04/01/19 1625    Specimen:  Blood Updated:  04/01/19 1716     WBC 26.16 10*3/mm3      RBC 4.09 10*6/mm3      Hemoglobin 10.1 g/dL      Hematocrit 30.1 %      MCV 73.6 fL      MCH 24.7 pg      MCHC 33.6 g/dL      RDW 15.2 %      RDW-SD 40.1 fl      MPV 11.3 fL      Platelets 195 10*3/mm3      Hemoglobin A1c [[id number]] Collected:  04/01/19 1625    Specimen:  Blood Updated:  04/01/19 1716     Hemoglobin A1C 9.1 %     Narrative:       Less than 6.0           Non-Diabetic Range  6.0-7.0                 ADA Therapeutic Target  Greater than 7.0        Action Suggested    Troponin [[id number]]  (Normal) Collected:  04/01/19 1625    Specimen:  Blood Updated:  04/01/19 1713     Troponin I 0.012 ng/mL     Lactic Acid, Plasma [[id number]]  (Abnormal) Collected:  04/01/19 1625    Specimen:  Blood Updated:  04/01/19 1712     Lactate 2.3 mmol/L     Lactic Acid, Reflex Timer (This will reflex a repeat order 3-3:15 hours after ordered.) [[id number]] Collected:  04/01/19 1625    Specimen:  Blood Updated:  04/01/19 1704    Comprehensive Metabolic Panel [[id number]]  (Abnormal) Collected:  04/01/19 1625    Specimen:  Blood Updated:  04/01/19 1702     Glucose 128 mg/dL      BUN 59 mg/dL      Creatinine 2.32 mg/dL      Sodium 129 mmol/L      Potassium 5.2 mmol/L      Chloride 97 mmol/L      CO2 20.0 mmol/L      Calcium 7.2 mg/dL      Total Protein 5.7 g/dL      Albumin 2.90 g/dL      ALT (SGPT) 26 U/L      AST (SGOT) 43 U/L      Alkaline Phosphatase 149 U/L      Total Bilirubin 1.1 mg/dL      eGFR Non African Amer 29 mL/min/1.73      Globulin 2.8 gm/dL      A/G Ratio 1.0 g/dL      BUN/Creatinine Ratio 25.4     Anion Gap 12.0 mmol/L     Narrative:       GFR Normal >60  Chronic Kidney Disease <60  Kidney Failure <15    Phosphorus [[id number]]  (Abnormal) Collected:  04/01/19 1625    Specimen:  Blood Updated:  04/01/19 1702     Phosphorus 4.7 mg/dL     Magnesium [[id number]]  (Normal) Collected:  04/01/19 1625    Specimen:  Blood Updated:  04/01/19 1702     Magnesium 2.2 mg/dL     Blood Culture - Blood, Hand, Right [[id number]] Collected:  04/01/19 1625    Specimen:  Blood from Hand, Right Updated:  04/01/19 1649    Blood Culture - Blood, Hand, Left [[id number]] Collected:  04/01/19 1625    Specimen:  Blood from Hand, Left Updated:   04/01/19 1649    Blood Gas, Arterial [[id number]]  (Abnormal) Collected:  04/01/19 1628    Specimen:  Arterial Blood Updated:  04/01/19 1632     Site Left Femoral     Shawn's Test N/A     pH, Arterial 7.291 pH units      Comment: 84 Value below reference range        pCO2, Arterial 41.6 mm Hg      pO2, Arterial 72.7 mm Hg      Comment: 84 Value below reference range        HCO3, Arterial 20.0 mmol/L      Base Excess, Arterial -6.2 mmol/L      Comment: 84 Value below reference range        O2 Saturation, Arterial 93.7 %      Comment: 84 Value below reference range        Temperature 37.0 C      Barometric Pressure for Blood Gas 756 mmHg      Modality Nasal Cannula     Flow Rate 4.0 lpm      Ventilator Mode NA     Collected by [id number]     Comment: Meter: [id number]     :  [id number]           Imaging Results (last 24 hours)     Procedure Component Value Units Date/Time    XR Abdomen KUB [[id number]] Collected:  04/01/19 1750     Updated:  04/01/19 1754    Narrative:       EXAMINATION: KUB radiograph 04/01/2019     HISTORY: NG tube placement     FINDINGS: KUB radiograph reveals an NG tube has been successfully  advanced into the stomach with the tip in the distal body/antrum. The  tube is well-positioned and ready for use.  This report was finalized on 04/01/2019 17:50 by Dr. Jorje Talavera MD.    XR Chest 1 View [[id number]] Collected:  04/01/19 1749     Updated:  04/01/19 1753    Narrative:       EXAMINATION: Chest one view 04/01/2019     HISTORY: Intubation postarrest     FINDINGS: Today's exam is compared to previous study of earlier today.  There is worsening pulmonary venous hypertension with developing  interstitial and alveolar perihilar pulmonary edema. A small left  effusion is suspected. There is no pneumothorax. Endotracheal tube has  been placed with the tip approximately 1.5 cm above the aki. A right  IJ deep line remains in place with the tip in the right atrium.       Impression:       1..  Endotracheal tube placed with the tip approximately 1.5 cm above the  aki. A right IJ deep line remains in place.  2. Worsening pulmonary venous hypertension with developing perihilar  interstitial and alveolar pulmonary edema.  This report was finalized on 04/01/2019 17:50 by Dr. Jorje Talavera MD.    XR Chest 1 View [[id number]] Collected:  04/01/19 1657     Updated:  04/01/19 1706    Narrative:       XR CHEST 1 VW- 4/1/2019 4:56 PM CDT     HISTORY: central line tube placement       COMPARISON: Chest x-ray dated 10/10/2018.     FINDINGS:   There are 2 areas of consolidation in the left lung base with partial  silhouetting of the left hemidiaphragm. Lungs are otherwise clear. No  pleural effusion. Heart is normal in size. Pulmonary vasculature are  unremarkable. There is a right IJ central line with tip projecting deep  within the right atrium.     The osseous structures and surrounding soft tissues demonstrate no acute  abnormality.       Impression:       1. Right IJ central line projects deep within the right atrium, possibly  near the valve. Consider withdrawing slightly.   2. Areas of consolidation in the left base may reflect pneumonia.        This report was finalized on 04/01/2019 17:03 by Dr Pop Munguia, .        I have personally reviewed and interpreted the radiology studies and ECG obtained at time of admission.     Assessment / Plan     Assessment:   Active Hospital Problems    Diagnosis   • Sepsis (CMS/[facility])     Assessment:  1.  Septic shock requiring vasopressors due to pneumonia  2.  New onset atrial fibrillation with RVR  3.  CARLOS due to sepsis  4.  Hematuria  5.  Anion gap metabolic acidosis due to lactic acidosis    Plan:   1.  IVF  2.  Levophed, vasopressin  3.  Duonebs  4.  Vanc, zosyn, merropenem  5.  BC x 2   6.  Respiratory culture  7.  Flu swab  8.  IVF  9.  Central line placed for CVP, access, and vasopressors     Approximately 45minutes of critical care time were spent managing the  patient exclusive of billable procedures.       Labs personally reviewed: Shows elevated creatinine and leukocytosis.  Otherwise brnign.    Radiology/Diagnostic imaging personally reviewed: CXR per my read shows bilateral infitrates R>L    Outside hospital records reviewed and summarized:  Patient received azithromycin and ceftriaxone there.  Vital signs reviewed.  Patient had mild leukocytosis, compensated ABG          Code Status: Presumed full     I discussed the patient's findings and my recommendations with the patient and bedside RNs    Estimated length of stay ?    Jadon Burnett MD   04/01/19   7:15 PM

## 2019-04-02 NOTE — NURSING NOTE
04/02/19 1020   Wound 04/01/19 1811 Left anterior foot pressure injury   Date first assessed/Time first assessed: 04/01/19 1811   Present On Admission : picture taken  Side: Left  Orientation: anterior  Location: foot  Type: pressure injury  Stage, Pressure Injury: Stage 3   Dressing Appearance open to air   Base slough;red;non-granulating   Red (%), Wound Tissue Color 80   Yellow (%), Wound Tissue Color 20   Periwound dry;pink   Periwound Temperature warm   Periwound Skin Turgor firm   Edges open   Wound Length (cm) 1.5 cm   Wound Width (cm) 1.8 cm   Wound Depth (cm) 0.3 cm   Drainage Characteristics/Odor serosanguineous   Drainage Amount small   Wound 04/01/19 1817 Right anterior foot diabetic/neuropathic ulceration   Date first assessed/Time first assessed: 04/01/19 1817   Present On Admission : yes;picture taken  Side: Right  Orientation: anterior  Location: foot  Type: diabetic/neuropathic ulceration   Dressing Appearance open to air   Base dry;closed/resurfaced  (callus)   Periwound dry   Periwound Temperature warm   Periwound Skin Turgor firm   Edges callused   Drainage Amount none     Pictures taken by nursing staff:           Patient appears to have pressure/diabetic injuries on bilateral feet.  It appears the patient may wear ill fitted shoes due to the linear abrasions across anterior aspect of left foot.  His A1C is 9.1.  According to chart review and nurse report, the patient is a resident at [facility].  Family reports he is cognitively impaired and limited ability to care for self.  He refuses to go to doctor's appointments and follow ups due to being afraid of physicians and hospitals.  Patient would benefit from outpatient wound care if he would agree to go after discharge.      At this time, suggest to paint wounds with Betadine, cover with adaptic, secure with rolled gauze.  No other needs at this time.  Will continue to follow.

## 2019-04-02 NOTE — PROGRESS NOTES
Discharge Planning Assessment  [facility]     Patient Name: Vitaly Pantoja  MRN: [id number]  Today's Date: 4/2/2019    Admit Date: 4/1/2019    Discharge Needs Assessment     Row Name 04/02/19 0955       Living Environment    Lives With  facility resident    Name(s) of Who Lives With Patient  Adamaris Quintana    Current Living Arrangements  other (see comments) personal care home    Primary Care Provided by  other (see comments) facility staff    Provides Primary Care For  no one, unable/limited ability to care for self    Family Caregiver if Needed  none    Quality of Family Relationships  other (see comments) distant    Able to Return to Prior Arrangements  yes       Resource/Environmental Concerns    Resource/Environmental Concerns  none       Transition Planning    Patient/Family Anticipates Transition to  other (see comments) personal care home    Patient/Family Anticipated Services at Transition  ;home health care;skilled nursing;rehabilitation services    Transportation Anticipated  other (see comments);health plan transportation       Discharge Needs Assessment    Readmission Within the Last 30 Days  no previous admission in last 30 days    Concerns to be Addressed  discharge planning    Anticipated Changes Related to Illness  inability to care for self    Outpatient/Agency/Support Group Needs  other (see comments) resides at personal care [place]    Discharge Facility/Level of Care Needs  other (see comments) personal care home    Current Discharge Risk  chronically ill;cognitively impaired    Discharge Coordination/Progress  Patient is a resident at [facility], [phone].  VIJI spoke with patient's brother, Catrachito Pantoja [phone].  Catrachito Pantoja advised he resides in [facility].  Catrachito Pantoja was unsure if patient had a legal guardian but stated he would think patient would?  VIJI contacted [facility] and spoke with Ratna who advised  patient does not have a legal guardian and is his own decision maker.  Patient is currently in CCU on vent.  Will follow to ensure patient will be able to return to personal care home upon discharge or if he will need rehab placement.        Discharge Plan    No documentation.       Destination      No service coordination in this encounter.      Durable Medical Equipment      No service coordination in this encounter.      Dialysis/Infusion      No service coordination in this encounter.      Home Medical Care      No service coordination in this encounter.      Therapy      No service coordination in this encounter.      Community Resources      No service coordination in this encounter.          Demographic Summary    No documentation.       Functional Status    No documentation.       Psychosocial    No documentation.       Abuse/Neglect    No documentation.       Legal    No documentation.       Substance Abuse    No documentation.       Patient Forms    No documentation.           CECILIA Estevez

## 2019-04-03 NOTE — PROGRESS NOTES
[facility] Medicine Services  INPATIENT PROGRESS NOTE    Patient Name: Vitaly Pantoja  Date of Admission: 4/1/2019  Today's Date: 04/03/19  Length of Stay: 2  Primary Care Physician: Emerson Sandhu MD    Subjective   Chief Complaint: Intubated  HPI   Doing ok.  Afebrile  Awakens easily even with sedation  No black or bloody stools  Pressors had to be turned up last night  Blood cultures at OSH reportedly + for Beta Hemolytic strep  Having a lot of secretions through OG tube      Review of Systems   Unable to perform ROS: Intubated        All pertinent negatives and positives are as above. All other systems have been reviewed and are negative unless otherwise stated.     Objective    Temp:  [98.3 °F (36.8 °C)-100.2 °F (37.9 °C)] 98.3 °F (36.8 °C)  Heart Rate:  [] 88  Resp:  [14-23] 15  BP: ()/(56-82) 135/75  Arterial Line BP: ()/(44-99) 94/86  FiO2 (%):  [40 %-50 %] 40 %  Physical Exam   Constitutional: He is oriented to person, place, and time. He appears well-developed and well-nourished. He is intubated.   HENT:   Head: Normocephalic and atraumatic.   Right Ear: External ear normal.   Left Ear: External ear normal.   Nose: Nose normal.   Mouth/Throat: Oropharynx is clear and moist.   Eyes: Conjunctivae and EOM are normal. Pupils are equal, round, and reactive to light. Right eye exhibits no discharge. Left eye exhibits no discharge. No scleral icterus.   Neck: Normal range of motion. Neck supple. No tracheal deviation present. No thyromegaly present.   Cardiovascular: Normal rate, regular rhythm, normal heart sounds and intact distal pulses. Exam reveals no gallop and no friction rub.   No murmur heard.  Pulmonary/Chest: Effort normal. No stridor. He is intubated. No respiratory distress. He has decreased breath sounds. He has no wheezes. He has rhonchi. He has no rales. He exhibits no tenderness.   Abdominal: Soft. Bowel sounds are normal. He exhibits  no distension and no mass. There is no tenderness. There is no rebound and no guarding. No hernia.   Musculoskeletal: Normal range of motion. He exhibits no edema or deformity.   Lymphadenopathy:     He has no cervical adenopathy.   Neurological: He is alert and oriented to person, place, and time. He has normal reflexes. He displays normal reflexes. No cranial nerve deficit. He exhibits normal muscle tone. Coordination normal.   Skin: Skin is warm and dry. No rash noted. No erythema. No pallor.   Vitals reviewed.          Results Review:  I have reviewed the labs, radiology results, and diagnostic studies.    Laboratory Data:   Results from last 7 days   Lab Units 04/03/19  0258 04/02/19  0236 04/01/19  1625   WBC 10*3/mm3 21.41* 32.39* 26.16*   HEMOGLOBIN g/dL 10.7* 11.7* 10.1*   HEMATOCRIT % 31.0* 35.0* 30.1*   PLATELETS 10*3/mm3 275 267 195        Results from last 7 days   Lab Units 04/03/19  0258 04/02/19  0236 04/01/19  1625   SODIUM mmol/L 140 135 129*   POTASSIUM mmol/L 3.5 5.6* 5.2   CHLORIDE mmol/L 101 101 97*   CO2 mmol/L 27.0 15.0* 20.0*   BUN mg/dL 49* 60* 59*   CREATININE mg/dL 1.30 2.45* 2.32*   CALCIUM mg/dL 7.0* 7.0* 7.2*   BILIRUBIN mg/dL 0.6 1.2* 1.1*   ALK PHOS U/L 168* 223* 149*   ALT (SGPT) U/L 56* 67* 26   AST (SGOT) U/L 42 158* 43   GLUCOSE mg/dL 402* 213* 128*       Culture Data:   Blood Culture   Date Value Ref Range Status   04/01/2019 No growth at 24 hours  Preliminary   04/01/2019 No growth at 24 hours  Preliminary     Urine Culture   Date Value Ref Range Status   04/01/2019 No growth at 2 days  Final     Respiratory Culture   Date Value Ref Range Status   04/01/2019 Heavy growth (4+) Normal Respiratory Danica  Preliminary       Radiology Data:   Imaging Results (last 24 hours)     Procedure Component Value Units Date/Time    XR Chest 1 View [[id number]] Collected:  04/03/19 0754     Updated:  04/03/19 0759    Narrative:       EXAMINATION: XR CHEST 1 VW-     4/3/2019 3:14 AM CDT      HISTORY: intubated; A41.9-Sepsis, unspecified organism; R65.21-Severe  sepsis with septic shock; I46.9-Cardiac arrest, cause unspecified.     One view chest x-ray compared with yesterday.     Stable endotracheal tube, nasogastric tube, and right jugular central  line.     Heart size is normal and unchanged.     Bibasilar infiltrate or edema with perihilar distribution.  Improved aeration of the left lung base with partial clearing.     Upper lobes remain relatively clear and there is no pneumothorax.     Summary:  1. Perihilar/lower lobe infiltrate or edema with partial clearing on the  left. The appearance of increased infiltrate or edema at right lower  lobe is felt to be exaggerated by the lower lung volumes.  This report was finalized on 04/03/2019 07:56 by Dr. Richard Santos MD.    XR Chest 1 View [[id number]] Collected:  04/03/19 0729     Updated:  04/03/19 0735    Narrative:       EXAMINATION: XR CHEST 1 VW- 4/3/2019 7:29 AM CDT     HISTORY: central line placement; A41.9-Sepsis, unspecified organism;  R65.21-Severe sepsis with septic shock; I46.9-Cardiac arrest, cause  unspecified.     REPORT: Comparison is made with the chest x-ray 04/02/2019 0317 hours.     The endotracheal tube appears in satisfactory position, with the tip  approximately 3.3 cm superior to the aki, it has been retracted  slightly. The right internal jugular central line has also been  retracted, tip projects over the SVC in good position. The gastric tube  is in the stomach in good position as before. The lungs are hypoaerated,  there are increased densities perihilar infiltrates and moderate  increase in left basilar atelectasis. Heart size is within normal  limits. No pneumothorax is identified.       Impression:       Repositioning of endotracheal tube and right internal  jugular central line, both in good position. The nasogastric tube  remains in good position. Hypoaeration of lungs with persistent and  increased central perihilar  infiltrates probably related to worsening  pulmonary edema. Pneumonia is not excluded however.  This report was finalized on 04/03/2019 07:32 by Dr. Catrachito Hobson MD.    US Venous Doppler Lower Extremity Bilateral (duplex) [[id number]] Updated:  04/02/19 1401          I have reviewed the patient's current medications.     Assessment/Plan     Active Hospital Problems    Diagnosis   • Sepsis (CMS/HCC)     1.  Septic Shock  -IV abx  -Levophed  -Vasopressin  -Titrate to MAP >65    2.  PEA Cardiac Arrest s/p ROCS  -On Amiodarone, continue to treat sepsis, continue pressure support.      3.  Afib RVR  -Amiodarone    4.  Metaolic Acidosis due to Lactic acidosis due to septic shock    5.  Shock Liver due to #1  -Continue pressure support    6.  Acute renal failure due to #1  -Resolved  -monitor     7.  Hyperkalemia  -Resolved, continue to monitor    8.  Streptococcal wound infection  -Check on sensitivities from [facility]  -Narrow coverage    9.  Pneumonia  -IV abx, will narrow coverage    10.  Acute on chronic combined systolic/diastolic CHF  -Diurese when more stable    Approximately 35 minutes of critical care time were spent managing the patient exclusive of billable procedures. Time spent evaluating patient, reviewing chart, titrating vasopressors and adjusting antibiotics to prevent deterioration from life-threatening septic shock        Discharge Planning: critically ill    Luke Matthews MD   04/03/19   10:30 AM

## 2019-04-03 NOTE — PROGRESS NOTES
"Pharmacy Dosing Service  Antimicrobial  Vancomycin    Assessment/Action/Plan:  Previous order: Vancomycin 1500 mg IVPB every 36 hours.     Last dose vancomycin 1500 mg received 04/03/19 @03:34; subsequently discontinued this AM, but has now been reordered for pharmacy to dose for pneumonia for 9 more days.    BUN/SC improving; dosage based on population pharmacokinetic parameters: 1500 mg IV every 24 hours starting 04/04/19 @09:00. Ordered random vancomycin level with AM labs 04/04/19.    Pharmacy will continue to follow daily.     Subjective:  Vitaly Pantoja is currently receiving vancomycin for the treatment of pneumonia, day #3; last dose currently scheduled for 4/12/19.    Past Medical / Surgical / Social History reviewed.     Objective:  59 y.o. male Ht: 165.1 cm (65\"); Wt: 79.3 kg (174 lb 14.4 oz) Body mass index is 29.1 kg/m².  Estimated Creatinine Clearance: 59.4 mL/min (by C-G formula based on SCr of 1.3 mg/dL).    Lab Results   Component Value Date    BUN 49 (H) 04/03/2019    BUN 60 (H) 04/02/2019    BUN 59 (H) 04/01/2019      Lab Results   Component Value Date    CREATININE 1.30 04/03/2019    CREATININE 2.45 (H) 04/02/2019    CREATININE 2.32 (H) 04/01/2019      Lab Results   Component Value Date    WBC 21.41 (H) 04/03/2019    WBC 32.39 (C) 04/02/2019    WBC 26.16 (H) 04/01/2019        No results found for: VANCOPEAK, VANCOTROUGH, VANCORANDOM      Culture Results:  Microbiology Results (last 10 days)     Procedure Component Value - Date/Time    Influenza Antigen, Rapid - Swab, Nasopharynx [[id number]]  (Normal) Collected:  04/01/19 2258    Lab Status:  Final result Specimen:  Swab from Nasopharynx Updated:  04/01/19 2318     Influenza A Ag, EIA Negative     Influenza B Ag, EIA Negative    Narrative:       Recommend confirmation of negative results by viral culture or molecular assay.    S. Pneumo Ag Urine or CSF - Urine, Urine, Clean Catch [[id number]]  (Normal) Collected:  04/01/19 2055    Lab Status:  " Final result Specimen:  Urine, Clean Catch Updated:  04/01/19 2117     Strep Pneumo Ag Negative    Legionella Antigen, Urine - Urine, Urine, Clean Catch [[id number]]  (Normal) Collected:  04/01/19 2055    Lab Status:  Final result Specimen:  Urine, Clean Catch Updated:  04/01/19 2117     LEGIONELLA ANTIGEN, URINE Negative    Urine Culture - Urine, Urine, Catheter [[id number]]  (Normal) Collected:  04/01/19 1933    Lab Status:  Final result Specimen:  Urine, Catheter Updated:  04/03/19 0942     Urine Culture No growth at 2 days    Respiratory Culture - Sputum, Cough [[id number]] Collected:  04/01/19 1745    Lab Status:  Preliminary result Specimen:  Sputum from Cough Updated:  04/03/19 1114     Respiratory Culture Heavy growth (4+) Normal Respiratory Twan     Gram Stain Greater than 25 WBCs per low power field      Many (4+) Mixed gram positive twan      Few (2+) Epithelial cells seen    Blood Culture - Blood, Hand, Right [[id number]] Collected:  04/01/19 1625    Lab Status:  Preliminary result Specimen:  Blood from Hand, Right Updated:  04/02/19 1700     Blood Culture No growth at 24 hours    Blood Culture - Blood, Hand, Left [[id number]] Collected:  04/01/19 1625    Lab Status:  Preliminary result Specimen:  Blood from Hand, Left Updated:  04/02/19 1700     Blood Culture No growth at 24 hours          Ivan Rainey, PharmD  04/03/19 3:18 PM

## 2019-04-03 NOTE — PLAN OF CARE
Problem: Patient Care Overview  Goal: Plan of Care Review   04/03/19 1546   Coping/Psychosocial   Plan of Care Reviewed With patient   Plan of Care Review   Progress no change   OTHER   Outcome Summary Pt now has a temp, orders given for blood cultures. He continues on botj levophed and vasopressin. The former is at lowest level before stopping it. Continuing on antibiotics, ammio, proporfol. Fecal management caesar has had a small amount of stool.        Problem: Skin Injury Risk (Adult)  Goal: Identify Related Risk Factors and Signs and Symptoms  Outcome: Ongoing (interventions implemented as appropriate)      Problem: Fall Risk (Adult)  Goal: Identify Related Risk Factors and Signs and Symptoms  Outcome: Ongoing (interventions implemented as appropriate)      Problem: Restraint, Nonbehavioral (Nonviolent)  Goal: Rationale and Justification  Outcome: Ongoing (interventions implemented as appropriate)      Problem: Sepsis/Septic Shock (Adult)  Goal: Signs and Symptoms of Listed Potential Problems Will be Absent, Minimized or Managed (Sepsis/Septic Shock)  Outcome: Ongoing (interventions implemented as appropriate)

## 2019-04-03 NOTE — PROGRESS NOTES
"Pharmacy Dosing Service  Antimicrobials  Meropenem     Assessment/Action/Plan:  Pharmacy to dose Meropenem for PNA/sepsis.     Current order: Meropenem 1 g IV Q12H for 7 days.  BUN/SCr improving; meropenem dosage adjusted accordingly: 1 gm IV every 8 hours (extended infusion)    Pharmacy will continue to follow  Current end date: 4/9/19       Subjective:  Vitaly Pantoja is a 59 y.o. male currently being treated for pneumonia/sepsis.    Past Medical / Surgical / Social History reviewed.     Objective:  59 y.o. male Ht: 165.1 cm (65\"); Wt: 79.3 kg (174 lb 14.4 oz) Body mass index is 29.1 kg/m².  Estimated Creatinine Clearance: 59.4 mL/min (by C-G formula based on SCr of 1.3 mg/dL).    Lab Results   Component Value Date    BUN 49 (H) 04/03/2019    BUN 60 (H) 04/02/2019    BUN 59 (H) 04/01/2019      Lab Results   Component Value Date    CREATININE 1.30 04/03/2019    CREATININE 2.45 (H) 04/02/2019    CREATININE 2.32 (H) 04/01/2019      Lab Results   Component Value Date    WBC 21.41 (H) 04/03/2019    WBC 32.39 (C) 04/02/2019    WBC 26.16 (H) 04/01/2019        Culture Results:  Microbiology Results (last 10 days)     Procedure Component Value - Date/Time    Influenza Antigen, Rapid - Swab, Nasopharynx [[id number]]  (Normal) Collected:  04/01/19 2258    Lab Status:  Final result Specimen:  Swab from Nasopharynx Updated:  04/01/19 2318     Influenza A Ag, EIA Negative     Influenza B Ag, EIA Negative    Narrative:       Recommend confirmation of negative results by viral culture or molecular assay.    S. Pneumo Ag Urine or CSF - Urine, Urine, Clean Catch [[id number]]  (Normal) Collected:  04/01/19 2055    Lab Status:  Final result Specimen:  Urine, Clean Catch Updated:  04/01/19 2117     Strep Pneumo Ag Negative    Legionella Antigen, Urine - Urine, Urine, Clean Catch [[id number]]  (Normal) Collected:  04/01/19 2055    Lab Status:  Final result Specimen:  Urine, Clean Catch Updated:  04/01/19 2117     LEGIONELLA ANTIGEN, " URINE Negative    Urine Culture - Urine, Urine, Catheter [[id number]]  (Normal) Collected:  04/01/19 1933    Lab Status:  Preliminary result Specimen:  Urine, Catheter Updated:  04/02/19 0807     Urine Culture No growth at 24 hours    Respiratory Culture - Sputum, Cough [[id number]] Collected:  04/01/19 1745    Lab Status:  Preliminary result Specimen:  Sputum from Cough Updated:  04/02/19 1103     Respiratory Culture Heavy growth (4+) Normal Respiratory Twan     Gram Stain Greater than 25 WBCs per low power field      Many (4+) Mixed gram positive twan      Few (2+) Epithelial cells seen    Blood Culture - Blood, Hand, Right [[id number]] Collected:  04/01/19 1625    Lab Status:  Preliminary result Specimen:  Blood from Hand, Right Updated:  04/02/19 1700     Blood Culture No growth at 24 hours    Blood Culture - Blood, Hand, Left [[id number]] Collected:  04/01/19 1625    Lab Status:  Preliminary result Specimen:  Blood from Hand, Left Updated:  04/02/19 1700     Blood Culture No growth at 24 hours          Ivan Rainey, PharmD  04/03/19 8:10 AM

## 2019-04-03 NOTE — PLAN OF CARE
Problem: Patient Care Overview  Goal: Plan of Care Review  Outcome: Ongoing (interventions implemented as appropriate)   04/03/19 0538   Coping/Psychosocial   Plan of Care Reviewed With patient   Plan of Care Review   Progress no change   OTHER   Outcome Summary VSS, afebrile, no signs of pain. R IJ central line replaced, confirmed by xray. Remains intubated and sedated. Wound care completed per order. cardizem off. will continue to monitor.      Goal: Individualization and Mutuality  Outcome: Ongoing (interventions implemented as appropriate)    Goal: Discharge Needs Assessment  Outcome: Ongoing (interventions implemented as appropriate)    Goal: Interprofessional Rounds/Family Conf  Outcome: Ongoing (interventions implemented as appropriate)      Problem: Skin Injury Risk (Adult)  Goal: Identify Related Risk Factors and Signs and Symptoms  Outcome: Ongoing (interventions implemented as appropriate)    Goal: Skin Health and Integrity  Outcome: Ongoing (interventions implemented as appropriate)      Problem: Fall Risk (Adult)  Goal: Identify Related Risk Factors and Signs and Symptoms  Outcome: Ongoing (interventions implemented as appropriate)    Goal: Absence of Fall  Outcome: Ongoing (interventions implemented as appropriate)      Problem: Restraint, Nonbehavioral (Nonviolent)  Goal: Rationale and Justification  Outcome: Ongoing (interventions implemented as appropriate)    Goal: Nonbehavioral (Nonviolent) Restraint: Absence of Injury/Harm  Outcome: Ongoing (interventions implemented as appropriate)    Goal: Nonbehavioral (Nonviolent) Restraint: Achievement of Discontinuation Criteria  Outcome: Ongoing (interventions implemented as appropriate)    Goal: Nonbehavioral (Nonviolent) Restraint: Preservation of Dignity and Wellbeing  Outcome: Ongoing (interventions implemented as appropriate)

## 2019-04-04 NOTE — PROCEDURES
"Insert Arterial Line  Date/Time: 4/1/2019 8:52 PM  Performed by: Jadon Burnett MD  Authorized by: Jadon Burnett MD   Consent: The procedure was performed in an emergent situation.  Required items: required blood products, implants, devices, and special equipment available  Patient identity confirmed: anonymous protocol, patient vented/unresponsive  Time out: Immediately prior to procedure a \"time out\" was called to verify the correct patient, procedure, equipment, support staff and site/side marked as required.  Preparation: Patient was prepped and draped in the usual sterile fashion.  Indications: multiple ABGs, respiratory failure and hemodynamic monitoring  Location: left radial  Shawn's test normal: yes  Needle gauge: 20  Seldinger technique: Seldinger technique used  Number of attempts: 1  Post-procedure: line sutured and dressing applied  Post-procedure CMS: normal  Patient tolerance: Patient tolerated the procedure well with no immediate complications              "
"Insert Central Line At Bedside  Date/Time: 4/1/2019 8:56 PM  Performed by: Jadon Burnett MD  Authorized by: Jadon Burnett MD   Consent: The procedure was performed in an emergent situation.  Required items: required blood products, implants, devices, and special equipment available  Patient identity confirmed: anonymous protocol, patient vented/unresponsive  Time out: Immediately prior to procedure a \"time out\" was called to verify the correct patient, procedure, equipment, support staff and site/side marked as required.  Indications: vascular access and central pressure monitoring    Anesthesia:  Local Anesthetic: lidocaine 1% without epinephrine  Anesthetic total: 6 mL  Preparation: skin prepped with ChloraPrep  Skin prep agent dried: skin prep agent completely dried prior to procedure  Sterile barriers: all five maximum sterile barriers used - cap, mask, sterile gown, sterile gloves, and large sterile sheet  Hand hygiene: hand hygiene performed prior to central venous catheter insertion  Location details: right internal jugular  Patient position: reverse Trendelenburg  Catheter type: triple lumen  Catheter size: 7 Fr  Pre-procedure: landmarks identified  Ultrasound guidance: yes  Sterile ultrasound techniques: sterile gel and sterile probe covers were used  Number of attempts: 1  Successful placement: yes  Post-procedure: line sutured and dressing applied  Assessment: blood return through all ports,  free fluid flow,  placement verified by x-ray and no pneumothorax on x-ray  Patient tolerance: Patient tolerated the procedure well with no immediate complications              "
"Intubation  Date/Time: 4/1/2019 8:53 PM  Performed by: Jadon Burnett MD  Authorized by: Jadon Burnett MD   Consent: The procedure was performed in an emergent situation.  Required items: required blood products, implants, devices, and special equipment available  Patient identity confirmed: anonymous protocol, patient vented/unresponsive  Time out: Immediately prior to procedure a \"time out\" was called to verify the correct patient, procedure, equipment, support staff and site/side marked as required.  Indications: respiratory distress,  respiratory failure and  hypoxemia  Intubation method: video-assisted  Patient status: unconscious  Laryngoscope size: Mac 3  Tube size: 7.5 mm  Tube type: cuffed  Number of attempts: 2  Cords visualized: yes  Post-procedure assessment: chest rise,  ETCO2 monitor and CO2 detector  Breath sounds: equal  Cuff inflated: yes  ETT to lip: 25 cm  Tube secured with: ETT case  Chest x-ray interpreted by me.  Chest x-ray findings: endotracheal tube in appropriate position  Patient tolerance: Patient tolerated the procedure well with no immediate complications              "
"Procedure insertion of central venous line.  Indication previous central line to long for access site was in right atrium at right AV junction.  Available equipment inappropriate to just pull back to much exposure so elected to change his line out for shorter line.  Consent: The procedure was performed in an emergent situation. Verbal consent not obtained. Written consent not obtained.  Patient identity confirmed: hospital-assigned identification number  Time out: Immediately prior to procedure a \"time out\" was called to verify the correct patient, procedure, equipment, support staff and site/side marked as required.  Indications: vascular access  Anesthesia: None   anesthesia:  Anesthesia: None    Sedation:  Patient sedated: Yes   Preparation: skin prepped with 2% chlorhexidine  Skin prep agent dried: skin prep agent completely dried prior to procedure  Sterile barriers: all five maximum sterile barriers used - cap, mask, sterile gown, sterile gloves, and large sterile sheet  Hand hygiene: hand hygiene performed prior to central venous catheter insertion  Location details: Right IJ  Patient position: trendelenburg  Oh 1 sutures were removed.  Area was cleaned thoroughly as well as catheter.  In sterile fashion new guidewire was placed through the old catheter.  It was subsequently withdrawn.  New line was placed via modified Seldinger procedure without difficulty and sutured into place.  All 3 ports aspirated and flushed without difficulty.  Sutured into place.  Chest x-ray confirmed good placement and good positioning of the line.  Catheter type: triple lumen  Ultrasound guidance: no  Number of attempts: 1  Successful placement: yes  Post-procedure: line sutured and dressing applied  Assessment: blood return through all ports  No complications  EBL Zero  CXR confirms placement and no pneumothorax      Inder Huynh MD  04/03/19  8:17 PM                      "
Never smoker

## 2019-04-04 NOTE — PLAN OF CARE
Problem: Patient Care Overview  Goal: Plan of Care Review  Outcome: Ongoing (interventions implemented as appropriate)   04/04/19 1440   Coping/Psychosocial   Plan of Care Reviewed With patient   OTHER   Outcome Summary Pt was alert and cooperative during swallow evaluation. At baseline, his voice sounded weak and gurgly, he was able to clear his vocal quality with a cued cough. He had a poor volitional cough and sounded congested. He was trialed on honey thick, nectar thick, and thin liquids. He initiated multiple swallows with honey thick and nectar thick consistencies. He was trialed on regular and pureed consistencies. He showed no overt s/s of aspiration; however, regular solid bolus had to be removed manually due to difficutly with mastication. Recommend continue NPO, pt is allowed to have ice chips and sips of water, meds with applesauce. May benefit from VFSS to r/o aspiration and upgrade diet.

## 2019-04-04 NOTE — PROGRESS NOTES
" Pharmacy Dosing Service  Antimicrobial  Vancomycin     Assessment/Action/Plan:  Current order: Vancomycin 1500 mg IVPB every 24 hours.     04/04/19 Vancomycin random level reviewed; BUN/SCr approaching baseline values. Proceeded with 1500 mg dose this AM, but will adjust subsequent doses to 1000 mg IV every 12 hours.    Pharmacy will continue to follow daily.      Subjective:  Vitaly Pantoja is currently receiving vancomycin for the treatment of pneumonia, day #4; last dose currently scheduled for 4/12/19          Past Medical / Surgical / Social History reviewed.     Objective:  59 y.o. male Ht: 165.1 cm (65\"); Wt: 80.5 kg (177 lb 8 oz) Body mass index is 29.54 kg/m².  Estimated Creatinine Clearance: 59.8 mL/min (by C-G formula based on SCr of 1.3 mg/dL).    Lab Results   Component Value Date    BUN 49 (H) 04/03/2019    BUN 60 (H) 04/02/2019    BUN 59 (H) 04/01/2019      Lab Results   Component Value Date    CREATININE 1.30 04/03/2019    CREATININE 2.45 (H) 04/02/2019    CREATININE 2.32 (H) 04/01/2019      Lab Results   Component Value Date    WBC 21.41 (H) 04/03/2019    WBC 32.39 (C) 04/02/2019    WBC 26.16 (H) 04/01/2019        Lab Results   Component Value Date    VANCORANDOM 8.05 04/04/2019         Culture Results:  Microbiology Results (last 10 days)     Procedure Component Value - Date/Time    Blood Culture With FAN - Blood, Arm, Right [[id number]] Collected:  04/03/19 1513    Lab Status:  Preliminary result Specimen:  Blood from Arm, Right Updated:  04/04/19 0400     Blood Culture No growth at less than 24 hours    Influenza Antigen, Rapid - Swab, Nasopharynx [[id number]]  (Normal) Collected:  04/01/19 2258    Lab Status:  Final result Specimen:  Swab from Nasopharynx Updated:  04/01/19 2318     Influenza A Ag, EIA Negative     Influenza B Ag, EIA Negative    Narrative:       Recommend confirmation of negative results by viral culture or molecular assay.    S. Pneumo Ag Urine or CSF - Urine, Urine, Clean " Catch [[id number]]  (Normal) Collected:  04/01/19 2055    Lab Status:  Final result Specimen:  Urine, Clean Catch Updated:  04/01/19 2117     Strep Pneumo Ag Negative    Legionella Antigen, Urine - Urine, Urine, Clean Catch [[id number]]  (Normal) Collected:  04/01/19 2055    Lab Status:  Final result Specimen:  Urine, Clean Catch Updated:  04/01/19 2117     LEGIONELLA ANTIGEN, URINE Negative    Urine Culture - Urine, Urine, Catheter [[id number]]  (Normal) Collected:  04/01/19 1933    Lab Status:  Final result Specimen:  Urine, Catheter Updated:  04/03/19 0942     Urine Culture No growth at 2 days    Respiratory Culture - Sputum, Cough [[id number]]  (Abnormal) Collected:  04/01/19 1745    Lab Status:  Preliminary result Specimen:  Sputum from Cough Updated:  04/04/19 0954     Respiratory Culture Heavy growth (4+) Normal Respiratory Twan      Moderate growth (3+) Staphylococcus aureus, MRSA     Comment:   Methicillin resistant Staphylococcus aureus, Patient may be an isolation risk.        Gram Stain Greater than 25 WBCs per low power field      Many (4+) Mixed gram positive twan      Few (2+) Epithelial cells seen    Blood Culture - Blood, Hand, Right [[id number]] Collected:  04/01/19 1625    Lab Status:  Preliminary result Specimen:  Blood from Hand, Right Updated:  04/03/19 1700     Blood Culture No growth at 2 days    Blood Culture - Blood, Hand, Left [[id number]] Collected:  04/01/19 1625    Lab Status:  Preliminary result Specimen:  Blood from Hand, Left Updated:  04/03/19 1700     Blood Culture No growth at 2 days          Ivan Rainey, PharmD  04/04/19 2:32 PM

## 2019-04-04 NOTE — PROGRESS NOTES
[facility] Medicine Services  INPATIENT PROGRESS NOTE    Patient Name: Vitaly Pantoja  Date of Admission: 4/1/2019  Today's Date: 04/04/19  Length of Stay: 3  Primary Care Physician: Emerson Sandhu MD    Subjective   Chief Complaint: Intubated  HPI   Doing well.  Still intubated  Awake and alert off sedation  Follows commands well  No fevers  No agitation        Review of Systems   Unable to perform ROS: Intubated        All pertinent negatives and positives are as above. All other systems have been reviewed and are negative unless otherwise stated.     Objective    Temp:  [97.9 °F (36.6 °C)-101.5 °F (38.6 °C)] 98.1 °F (36.7 °C)  Heart Rate:  [65-98] 84  Resp:  [14-22] 19  BP: (104-137)/(42-85) 137/72  Arterial Line BP: (102-168)/(52-83) 168/76  FiO2 (%):  [30 %] 30 %  Physical Exam   Constitutional: He appears well-developed and well-nourished.   HENT:   Head: Normocephalic and atraumatic.   Right Ear: External ear normal.   Left Ear: External ear normal.   Nose: Nose normal.   Mouth/Throat: Oropharynx is clear and moist.   Eyes: Conjunctivae and EOM are normal. Pupils are equal, round, and reactive to light. Right eye exhibits no discharge. Left eye exhibits no discharge. No scleral icterus.   Neck: Normal range of motion. Neck supple. No tracheal deviation present. No thyromegaly present.   Cardiovascular: Normal rate, regular rhythm, normal heart sounds and intact distal pulses. Exam reveals no gallop and no friction rub.   No murmur heard.  Pulmonary/Chest: Effort normal. No stridor. No respiratory distress. He has decreased breath sounds. He has no wheezes. He has rhonchi. He has no rales. He exhibits no tenderness.   Abdominal: Soft. Bowel sounds are normal. He exhibits no distension and no mass. There is no tenderness. There is no rebound and no guarding. No hernia.   Musculoskeletal: Normal range of motion. He exhibits no edema or deformity.   Lymphadenopathy:      He has no cervical adenopathy.   Neurological: He is alert. He has normal reflexes. He displays normal reflexes. No cranial nerve deficit. He exhibits normal muscle tone. Coordination normal.   Skin: Skin is warm and dry. No rash noted. No erythema. No pallor.   Psychiatric: Thought content normal.   Vitals reviewed.          Results Review:  I have reviewed the labs, radiology results, and diagnostic studies.    Laboratory Data:   Results from last 7 days   Lab Units 04/03/19  0258 04/02/19  0236 04/01/19  1625   WBC 10*3/mm3 21.41* 32.39* 26.16*   HEMOGLOBIN g/dL 10.7* 11.7* 10.1*   HEMATOCRIT % 31.0* 35.0* 30.1*   PLATELETS 10*3/mm3 275 267 195        Results from last 7 days   Lab Units 04/03/19 0258 04/02/19  0236 04/01/19  1625   SODIUM mmol/L 140 135 129*   POTASSIUM mmol/L 3.5 5.6* 5.2   CHLORIDE mmol/L 101 101 97*   CO2 mmol/L 27.0 15.0* 20.0*   BUN mg/dL 49* 60* 59*   CREATININE mg/dL 1.30 2.45* 2.32*   CALCIUM mg/dL 7.0* 7.0* 7.2*   BILIRUBIN mg/dL 0.6 1.2* 1.1*   ALK PHOS U/L 168* 223* 149*   ALT (SGPT) U/L 56* 67* 26   AST (SGOT) U/L 42 158* 43   GLUCOSE mg/dL 402* 213* 128*       Culture Data:   Blood Culture   Date Value Ref Range Status   04/03/2019 No growth at less than 24 hours  Preliminary   04/01/2019 No growth at 2 days  Preliminary   04/01/2019 No growth at 2 days  Preliminary     Urine Culture   Date Value Ref Range Status   04/01/2019 No growth at 2 days  Final     Respiratory Culture   Date Value Ref Range Status   04/01/2019 Heavy growth (4+) Normal Respiratory Danica  Preliminary   04/01/2019 Moderate growth (3+) Staphylococcus aureus, MRSA (A)  Preliminary     Comment:       Methicillin resistant Staphylococcus aureus, Patient may be an isolation risk.       Radiology Data:   Imaging Results (last 24 hours)     Procedure Component Value Units Date/Time    XR Chest 1 View [[id number]] Collected:  04/04/19 0728     Updated:  04/04/19 0732    Narrative:       EXAMINATION: XR CHEST 1 VW-  4/4/2019 7:28 AM CDT     HISTORY: intubated; A41.9-Sepsis, unspecified organism; R65.21-Severe  sepsis with septic shock; I46.9-Cardiac arrest, cause unspecified.     REPORT: A frontal view the chest is compared with the previous exam  04/03/2019 0319 hours.     The endotracheal tube appears in satisfactory position with the tip  approximately 4.4 cm superior to the aki. The nasogastric tube and  right internal jugular central line appear to be in good position. Lungs  are hypoaerated, there is central vascular congestion with mild  improvement in pulmonary edema. Heart size is normal. No pneumothorax is  identified. No definite pleural effusion is seen. The osseous structures  are unremarkable.       Impression:       Stable satisfactory position of the life-support lines.  Improvement in pulmonary edema and volume overload is noted. Continued  follow-up is recommended.  This report was finalized on 04/04/2019 07:29 by Dr. Catrachito Hobson MD.          I have reviewed the patient's current medications.     Assessment/Plan     Active Hospital Problems    Diagnosis   • Sepsis (CMS/HCC)       1.  Septic Shock  -IV abx  -off pressors     2.  PEA Cardiac Arrest s/p ROCS  -On Amiodarone, continue to treat sepsis, continue pressure support.       3.  Afib RVR  -Amiodarone to PO once extubated  -[id number] 1     4.  Metaolic Acidosis due to Lactic acidosis due to septic shock  -improved     5.  Shock Liver due to #1  -Continue pressure support     6.  Acute renal failure due to #1  -Resolved  -monitor      7.  Hyperkalemia  -Resolved, continue to monitor     8.  Staphylcoccus Pneumonia  -IV abx, will narrow coverage     10.  Acute on chronic combined systolic/diastolic CHF  -Diurese when more stable                  Discharge Planning: I expect the patient to be discharged to home vs rehab in 3-4 days    Luke Matthews MD   04/04/19   12:35 PM

## 2019-04-04 NOTE — PLAN OF CARE
Problem: Patient Care Overview  Goal: Plan of Care Review  Outcome: Ongoing (interventions implemented as appropriate)   04/04/19 8151   OTHER   Outcome Summary Pt extubated today. NPO day 4. Will cont to follow for diet advancement.        Problem: Nutrition, Imbalanced: Inadequate Oral Intake (Adult)  Goal: Identify Related Risk Factors and Signs and Symptoms  Outcome: Ongoing (interventions implemented as appropriate)    Goal: Improved Oral Intake  Outcome: Ongoing (interventions implemented as appropriate)    Goal: Prevent Further Weight Loss  Outcome: Ongoing (interventions implemented as appropriate)

## 2019-04-04 NOTE — PROGRESS NOTES
Continued Stay Note  [facility]     Patient Name: Vitaly Pantoja  MRN: [id number]  Today's Date: 4/4/2019    Admit Date: 4/1/2019    Discharge Plan     Row Name 04/04/19 1559       Plan    Plan  [facility] referral    Plan Comments  [facility] referral received.  Have informed Lupe with [facility] of referral.        Discharge Codes    No documentation.             VIV EstevezW

## 2019-04-04 NOTE — THERAPY EVALUATION
Acute Care - Speech Language Pathology   Swallow Initial Evaluation [facility]     Patient Name: Vitaly Pantoja  : 1960  MRN: [id number]  Today's Date: 2019               Admit Date: 2019  Pt was alert and cooperative during swallow evaluation. At baseline, his voice sounded weak and gurgly, he was able to clear his vocal quality with a cued cough. He had a poor volitional cough and sounded congested. He was trialed on honey thick, nectar thick, and thin liquids. He initiated multiple swallows with honey thick and nectar thick consistencies. He was trialed on regular and pureed consistencies. He showed no overt s/s of aspiration; however, regular solid bolus had to be removed manually due to difficutly with mastication. Recommend continue NPO, pt is allowed to have ice chips and sips of water, meds with applesauce. May benefit from VFSS to r/o aspiration and upgrade diet.  Daphne Staples, Speech Therapy Student    Visit Dx:     ICD-10-CM ICD-9-CM   1. Shock, septic (CMS/HCC) A41.9 038.9    R65.21 785.52     995.92   2. Cardiac arrest (CMS/HCC) I46.9 427.5   3. Oropharyngeal dysphagia R13.12 787.22     Patient Active Problem List   Diagnosis   • Sepsis (CMS/HCC)     Past Medical History:   Diagnosis Date   • Back pain     current back pain per pt   • Chronic rheumatic arthritis (CMS/HCC)    • Diabetes mellitus (CMS/HCC)    • Hip dysplasia, congenital      Past Surgical History:   Procedure Laterality Date   • APPENDECTOMY     • JOINT REPLACEMENT Bilateral     TKA   • TONSILLECTOMY          SWALLOW EVALUATION (last 72 hours)      SLP Adult Swallow Evaluation     Row Name 19 1340                   Rehab Evaluation    Document Type  evaluation  (Pended)   -        Subjective Information  no complaints  (Pended)   -        Patient Observations  alert;cooperative  (Pended)   -        Patient/Family Observations  No family present  (Pended)   -        Patient Effort  good  (Pended)   -         Symptoms Noted During/After Treatment  none  (Pended)   -           General Information    Patient Profile Reviewed  yes  (Pended)   -        Pertinent History Of Current Problem  History of hypotension, pneumonia, a fib, septic shock, diabetes, previous tonilectomy, cardiac arrest, CPR, intubated on 4-1-19, extubated on 4-4-19  (Pended)   -        Current Method of Nutrition  NPO  (Pended)   -        Precautions/Limitations, Vision  WFL  (Pended)   -        Precautions/Limitations, Hearing  WFL  (Pended)   -        Prior Level of Function-Communication  cognitive-linguistic impairment  (Pended)   -        Prior Level of Function-Swallowing  NPO  (Pended)   -        Plans/Goals Discussed with  patient  (Pended)   -        Barriers to Rehab  previous functional deficit  (Pended)   -        Patient's Goals for Discharge  patient did not state  (Pended)   -        Family Goals for Discharge  other (see comments)  (Pended)  Family not present   -           Pain Assessment    Additional Documentation  Pain Scale: FACES Pre/Post-Treatment (Group)  (Pended)   -           Pain Scale: FACES Pre/Post-Treatment    Pain: FACES Scale, Pretreatment  0-->no hurt  (Pended)   -        Pain: FACES Scale, Post-Treatment  0-->no hurt  (Pended)   -           Oral Motor and Function    Dentition Assessment  poor oral hygiene;missing teeth;teeth are in poor condition  (Pended)   -        Secretion Management  WNL/WFL  (Pended)   -        Mucosal Quality  cracked;dry  (Pended)   -        Volitional Swallow  weak  (Pended)   -        Volitional Cough  weak;non-productive  (Pended)   -           Oral Musculature and Cranial Nerve Assessment    Oral Motor General Assessment  generalized oral motor weakness  (Pended)   -        Mandibular Impairment Detail, Cranial Nerve V (Trigeminal)  reduced strength bilaterally  (Pended)   -           General Eating/Swallowing Observations    Respiratory  Support Currently in Use  nasal cannula  (Pended)   -        Eating/Swallowing Skills  fed by SLP;unaware of safety concerns  (Pended)   -        Positioning During Eating  upright in bed  (Pended)   -        Utensils Used  spoon;straw  (Pended)   -        Consistencies Trialed  regular textures;pureed;thin liquids;nectar/syrup-thick liquids;honey-thick liquids  (Pended)   -           Clinical Swallow Eval    Oral Prep Phase  impaired  (Pended)   -        Oral Transit  WFL  (Pended)   -        Oral Residue  WFL  (Pended)   -        Pharyngeal Phase  no overt signs/symptoms of pharyngeal impairment  (Pended)   -        Esophageal Phase  unremarkable  (Pended)   -           Oral Prep Concerns    Oral Prep Concerns  bolus removed from mouth manually  (Pended)   -        Bolus Removed from Mouth Manually  regular consistencies  (Pended)   -        Oral Prep Concerns, Comment  Difficulty with mastication  (Pended)   -           Recommendations    Therapy Frequency (Swallow)  3 days per week  (Pended)   -        Predicted Duration Therapy Intervention (Days)  until discharge  (Pended)   -        SLP Diet Recommendation  NPO;ice chips between meals after oral care, with supervision;water between meals after oral care, with supervision  (Pended)   -        Recommended Diagnostics  VFSS (MBS)  (Pended)   -        SLP Rec. for Method of Medication Administration  with pudding or applesauce;with thin liquids  (Pended)   -        Monitor for Signs of Aspiration  yes;notify SLP if any concerns;cough;throat clearing;gurgly voice;fever  (Pended)   -        Anticipated Dischage Disposition  unknown  (Pended)   -           Swallow Goals (SLP)    Oral Nutrition/Hydration Goal Selection (SLP)  oral nutrition/hydration, SLP goal 1  (Pended)   -           Oral Nutrition/Hydration Goal 1 (SLP)    Oral Nutrition/Hydration Goal 1, SLP  Pt will tolerate least restrictive diet with no overt s/s of  aspiration.  (Pended)   -        Time Frame (Oral Nutrition/Hydration Goal 1, SLP)  by discharge  (Pended)   -        Barriers (Oral Nutrition/Hydration Goal 1, SLP)  cognitive impairment  (Pended)   -        Progress/Outcomes (Oral Nutrition/Hydration Goal 1, SLP)  goal ongoing  (Pended)   -          User Key  (r) = Recorded By, (t) = Taken By, (c) = Cosigned By    Initials Name Effective Dates     Daphne Staples, Speech Therapy Student 02/27/19 -           EDUCATION  The patient has been educated in the following areas:   Dysphagia (Swallowing Impairment).    SLP Recommendation and Plan     SLP Diet Recommendation: (P) NPO, ice chips between meals after oral care, with supervision, water between meals after oral care, with supervision        Monitor for Signs of Aspiration: (P) yes, notify SLP if any concerns, cough, throat clearing, gurgly voice, fever  Recommended Diagnostics: (P) VFSS (MBS)     Anticipated Dischage Disposition: (P) unknown     Therapy Frequency (Swallow): (P) 3 days per week  Predicted Duration Therapy Intervention (Days): (P) until discharge       Plan of Care Reviewed With: (P) patient  Plan of Care Review  Plan of Care Reviewed With: (P) patient  Outcome Summary: (P) Pt was alert and cooperative during swallow evaluation. At baseline, his voice sounded weak and gurgly. He had a poor volitional cough and sounded congested. He was trialed on honey thick, nectar thick, and thin liquids. He initiated multiple swallows with honey thick and nectar thick consistencies. He was trialed on regular and pureed consistencies.  He showed no overt s/s of aspiration; however, regular solid bolus had to be removed manually due to difficutly with mastication. Recommend continue NPO, pt is allowed to have ice chips and sips of water, recommend VFSS to upgrade diet.     SLP GOALS     Row Name 04/04/19 1340             Oral Nutrition/Hydration Goal 1 (SLP)    Oral Nutrition/Hydration Goal 1, SLP  Pt  will tolerate least restrictive diet with no overt s/s of aspiration.  (Pended)   -      Time Frame (Oral Nutrition/Hydration Goal 1, SLP)  by discharge  (Pended)   -      Barriers (Oral Nutrition/Hydration Goal 1, SLP)  cognitive impairment  (Pended)   -      Progress/Outcomes (Oral Nutrition/Hydration Goal 1, SLP)  goal ongoing  (Pended)   -        User Key  (r) = Recorded By, (t) = Taken By, (c) = Cosigned By    Initials Name Provider Type     Daphne Staples, Speech Therapy Student Speech Therapy Student             Time Calculation:   Time Calculation- SLP     Row Name 04/04/19 1448             Time Calculation- [facility]    SLP Start Time  1340  (Pended)   -      SLP Stop Time  1420  (Pended)   -      SLP Time Calculation (min)  40 min  (Pended)   -      SLP Received On  04/04/19  (Pended)   -      SLP Goal Re-Cert Due Date  05/02/19  (Pended)   -        User Key  (r) = Recorded By, (t) = Taken By, (c) = Cosigned By    Initials Name Provider Type     Daphne Staples, Speech Therapy Student Speech Therapy Student          Therapy Charges for Today     Code Description Service Date Service Provider Modifiers Qty    [id number] HC ST EVAL ORAL PHARYNG SWALLOW 3 4/4/2019 Daphne Staples Speech Therapy Student GN 1               Daphne Staples Speech Therapy Student  4/4/2019

## 2019-04-05 NOTE — SIGNIFICANT NOTE
Bedside report and neuro with Nilda, RN. Patient alert to person and time, not situation or place. Follows commands in all extremities, and pupils perrla.

## 2019-04-05 NOTE — PLAN OF CARE
Problem: Patient Care Overview  Goal: Plan of Care Review  Outcome: Ongoing (interventions implemented as appropriate)   04/05/19 0431   Coping/Psychosocial   Plan of Care Reviewed With patient   Plan of Care Review   Progress no change   OTHER   Outcome Summary Pt HR afib 90s-110s most of night. Other VSS and afebrile. Oriented to self and time with baseline mental delay- following commands. Stable on room air. Pt NPO with sips/chips. Segal in place with adequate uop. FMS in place with scant stool overnight and some leakage around tube. Repositioning pt in bed Q2H. Wound care as ordered to left and right foot wounds. BS monitored Q6H and treated with insulin. Receiving IV abx. Possible transfer to floor today.     Goal: Individualization and Mutuality  Outcome: Ongoing (interventions implemented as appropriate)    Goal: Discharge Needs Assessment  Outcome: Ongoing (interventions implemented as appropriate)    Goal: Interprofessional Rounds/Family Conf  Outcome: Ongoing (interventions implemented as appropriate)      Problem: Skin Injury Risk (Adult)  Goal: Identify Related Risk Factors and Signs and Symptoms  Outcome: Outcome(s) achieved Date Met: 04/05/19    Goal: Skin Health and Integrity  Outcome: Ongoing (interventions implemented as appropriate)      Problem: Fall Risk (Adult)  Goal: Identify Related Risk Factors and Signs and Symptoms  Outcome: Outcome(s) achieved Date Met: 04/05/19    Goal: Absence of Fall  Outcome: Ongoing (interventions implemented as appropriate)      Problem: Sepsis/Septic Shock (Adult)  Goal: Signs and Symptoms of Listed Potential Problems Will be Absent, Minimized or Managed (Sepsis/Septic Shock)  Outcome: Ongoing (interventions implemented as appropriate)      Problem: Nutrition, Imbalanced: Inadequate Oral Intake (Adult)  Goal: Identify Related Risk Factors and Signs and Symptoms  Outcome: Outcome(s) achieved Date Met: 04/05/19    Goal: Improved Oral Intake  Outcome: Ongoing  (interventions implemented as appropriate)    Goal: Prevent Further Weight Loss  Outcome: Ongoing (interventions implemented as appropriate)

## 2019-04-05 NOTE — MBS/VFSS/FEES
Acute Care - Speech Language Pathology   Swallow Initial Evaluation [facility]     Patient Name: Vitaly Pantoja  : 1960  MRN: [id number]  Today's Date: 2019  Onset of Illness/Injury or Date of Surgery: 19     Referring Physician: Dr. Milligan      Admit Date: 2019  VFSS in radiology completed. Trials were presented in the following order: honey x2, nectar, pudding x2, honey. Pt exhibited poor hyolaryngeal elevation/excursion, epiglottic inversion, and pharyngeal constriction. With the first honey thick trial the pt passed only a minimal amount of the bolus into the vallecula with profound residue remaining in the pharyngeal area. He cleared some residue when using a chin tuck, but had laryngeal penetration of residue and still had severe residue remaining. With the rest of the trials the pt was instructed to use a chin tuck strategy. He exhibited silent laryngeal penetration during the initial swallow of nectar thick then subsequent silent aspiration during a multiple swallow. He was cued to cough and swallow again, but was still observed to have residue in the laryngeal vestibule and trachea. No definite laryngeal penetration or aspiration was observed with pudding thick, but the pt still had severe pharyngeal residue putting him at an increased risk for aspiration after the swallow. Again, using a chin tuck did seem to help clear more residue, but what was remaining was still of a severe amount.   Recommend:  1. Continuing NPO except for meds crushed in applesauce and using a chin tuck strategy during swallows.   2. Ok for occasional ice chips with supervision.   3. Attempting trial tray of pureed foods with honey thick liquids and chin tuck strategy with SLP tomorrow to determine if pt may be able to tolerate those consistencies.   4. Otherwise pt may benefit from TAWANDA.   Julia Nevarez, CCC-SLP 2019 4:35 PM    Visit Dx:     ICD-10-CM ICD-9-CM   1. Shock, septic (CMS/HCC) A41.9  038.9    R65.21 785.52     995.92   2. Cardiac arrest (CMS/HCC) I46.9 427.5   3. Oropharyngeal dysphagia R13.12 787.22   4. Decreased activities of daily living (ADL) R68.89 780.99   5. Impaired mobility Z74.09 799.89     Patient Active Problem List   Diagnosis   • Sepsis (CMS/HCC)     Past Medical History:   Diagnosis Date   • Back pain     current back pain per pt   • Chronic rheumatic arthritis (CMS/[facility])    • Diabetes mellitus (CMS/[facility])    • Hip dysplasia, congenital      Past Surgical History:   Procedure Laterality Date   • APPENDECTOMY     • JOINT REPLACEMENT Bilateral 2015    TKA   • TONSILLECTOMY          SWALLOW EVALUATION (last 72 hours)      SLP Adult Swallow Evaluation     Row Name 04/05/19 1504 04/04/19 1340                Rehab Evaluation    Document Type  evaluation  -MB  evaluation  -MB (r) EH (t) MB (c)       Subjective Information  complains of thirst  -MB  no complaints  -MB (r) EH (t) MB (c)       Patient Observations  alert;cooperative  -MB  alert;cooperative  -MB (r) EH (t) MB (c)       Patient/Family Observations  No family present  -MB  No family present  -MB (r) EH (t) MB (c)       Patient Effort  --  good  -MB (r) EH (t) MB (c)       Symptoms Noted During/After Treatment  --  none  -MB (r) EH (t) MB (c)          General Information    Patient Profile Reviewed  yes  -MB  yes  -MB (r) EH (t) MB (c)       Pertinent History Of Current Problem  History of hypotension, pneumonia, a fib, septic shock, diabetes, previous tonsillectomy, cardiac arrest, CPR, intubated on 4-1-19, extubated on 4-4-19  -MB  History of hypotension, pneumonia, a fib, septic shock, diabetes, previous tonsillectomy, cardiac arrest, CPR, intubated on 4-1-19, extubated on 4-4-19  -MB       Current Method of Nutrition  NPO except meds with applesauce and sips/chips  -MB  NPO  -MB (r) EH (t) MB (c)       Precautions/Limitations, Vision  WFL  -MB  WFL  -MB (r) EH (t) MB (c)       Precautions/Limitations, Hearing  WFL  -MB  WFL   -MB (r) EH (t) MB (c)       Prior Level of Function-Communication  cognitive-linguistic impairment;other (see comments) intellectual disability  -MB  cognitive-linguistic impairment;other (see comments) Intellectual disability  -MB       Prior Level of Function-Swallowing  --  NPO  -MB (r) EH (t) MB (c)       Plans/Goals Discussed with  patient  -MB  patient  -MB (r) EH (t) MB (c)       Barriers to Rehab  cognitive status  -MB  previous functional deficit  -MB (r) EH (t) MB (c)       Patient's Goals for Discharge  return to PO diet  -MB  patient did not state  -MB (r) EH (t) MB (c)       Family Goals for Discharge  --  other (see comments) Family not present   -MB (r) EH (t) MB (c)          Pain Assessment    Additional Documentation  --  Pain Scale: FACES Pre/Post-Treatment (Group)  -MB (r) EH (t) MB (c)          Pain Scale: FACES Pre/Post-Treatment    Pain: FACES Scale, Pretreatment  0-->no hurt  -MB  0-->no hurt  -MB (r) EH (t) MB (c)       Pain: FACES Scale, Post-Treatment  --  0-->no hurt  -MB (r) EH (t) MB (c)          Oral Motor and Function    Dentition Assessment  poor oral hygiene;missing teeth;teeth are in poor condition  -MB  poor oral hygiene;missing teeth;teeth are in poor condition  -MB (r) EH (t) MB (c)       Secretion Management  WNL/WFL  -MB  WNL/WFL  -MB (r) EH (t) MB (c)       Mucosal Quality  moist, healthy  -MB  cracked;dry  -MB (r) EH (t) MB (c)       Volitional Swallow  --  weak  -MB (r) EH (t) MB (c)       Volitional Cough  --  weak;non-productive  -MB (r) EH (t) MB (c)          Oral Musculature and Cranial Nerve Assessment    Oral Motor General Assessment  --  generalized oral motor weakness  -MB (r) EH (t) MB (c)       Mandibular Impairment Detail, Cranial Nerve V (Trigeminal)  --  reduced strength bilaterally  -MB (r) EH (t) MB (c)          General Eating/Swallowing Observations    Respiratory Support Currently in Use  --  nasal cannula  -MB (r) EH (t) MB (c)       Eating/Swallowing  Skills  --  fed by SLP;unaware of safety concerns  -MB (r) EH (t) MB (c)       Positioning During Eating  --  upright in bed  -MB (r) EH (t) MB (c)       Utensils Used  --  spoon;straw  -MB (r) EH (t) MB (c)       Consistencies Trialed  --  regular textures;pureed;thin liquids;nectar/syrup-thick liquids;honey-thick liquids  -MB (r) EH (t) MB (c)          Clinical Swallow Eval    Oral Prep Phase  --  impaired  -MB (r) EH (t) MB (c)       Oral Transit  --  WFL  -MB (r) EH (t) MB (c)       Oral Residue  --  WFL  -MB (r) EH (t) MB (c)       Pharyngeal Phase  --  no overt signs/symptoms of pharyngeal impairment  -MB (r) EH (t) MB (c)       Esophageal Phase  --  unremarkable  -MB (r) EH (t) MB (c)          Oral Prep Concerns    Oral Prep Concerns  --  bolus removed from mouth manually  -MB (r) EH (t) MB (c)       Bolus Removed from Mouth Manually  --  regular consistencies  -MB (r) EH (t) MB (c)       Oral Prep Concerns, Comment  --  Difficulty with mastication  -MB (r) EH (t) MB (c)          MBS/VFSS    Utensils Used  spoon;straw  -MB  --       Consistencies Trialed  nectar/syrup-thick liquids;honey-thick liquids;pudding thick  -MB  --          MBS/VFSS Interpretation    Oral Prep Phase  impaired oral phase of swallowing  -MB  --       Oral Transit Phase  impaired  -MB  --       Oral Residue  impaired  -MB  --       VFSS Summary  Trials were presented in the following order: honey x2, nectar, pudding x2, honey. Pt exhibited poor hyolaryngeal elevation/excursion, epiglottic inversion, and pharyngeal constriction. With the first honey thick trial the pt passed only a minimal amount of the bolus into the vallecula with profound residue remaining in the pharyngeal area. He cleared some residue when using a chin tuck, but had laryngeal penetration of residue and still had severe residue remaining. With the rest of the trials the pt was instructed to use a chin tuck strategy. He exhibited silent laryngeal penetration during  the initial swallow of nectar thick then subsequent silent aspiration during a multiple swallow. He was cued to cough and swallow again, but was still observed to have residue in the laryngeal vestibule and trachea. No definite laryngeal penetration or aspiration was observed with pudding thick, but the pt still had severe pharyngeal residue putting him at an increased risk for aspiration after the swallow. Again, using a chin tuck did seem to help clear more residue, but what was remaining was still of a severe amount.   -MB  --          Oral Preparatory Phase    Oral Preparatory Phase  inadequate manipulation  -MB  --       Inadequate Manipulation  all consistencies tested  -MB  --          Oral Transit Phase    Impaired Oral Transit Phase  increased A-P transit time  -MB  --       Increased A-P Transit Time  all consistencies tested  -MB  --          Oral Residue    Impaired Oral Residue  diffuse residue throughout oral cavity  -MB  --       Diffuse Residue throughout Oral Cavity  all consistencies tested  -MB  --          Initiation of Pharyngeal Swallow    Pharyngeal Phase  impaired pharyngeal phase of swallowing  -MB  --       Penetration During the Swallow  nectar-thick liquids  -MB  --       Aspiration During the Swallow  nectar-thick liquids  -MB  --       Penetration After the Swallow  honey-thick liquids  -MB  --       Response to Penetration  no response  -MB  --       Response to Aspiration  no response, silent aspiration  -MB  --       Pharyngeal Residue  all consistencies tested;diffuse within pharynx  -MB  --       Response to Residue  unable to clear residue  -MB  --       Attempted Compensatory Maneuvers  chin tuck  -MB  --       Response to Attempted Compensatory Maneuvers  reduced residue;other (see comments) still severe residue remaining  -MB  --          Clinical Impression    SLP Swallowing Diagnosis  severe;pharyngeal dysfunction  -MB  --       Functional Impact  risk of  aspiration/pneumonia;risk of malnutrition;risk of dehydration  -MB  --       Rehab Potential/Prognosis, Swallowing  adequate, monitor progress closely  -MB  --       Swallow Criteria for Skilled Therapeutic Interventions Met  demonstrates skilled criteria  -MB  --          Recommendations    Therapy Frequency (Swallow)  3 days per week  -MB  3 days per week  -MB (r) EH (t) MB (c)       Predicted Duration Therapy Intervention (Days)  until discharge  -MB  until discharge  -MB (r) EH (t) MB (c)       SLP Diet Recommendation  NPO;ice chips between meals after oral care, with supervision;other (see comments) trial tray of pureed/honey tomorrow  -MB  NPO;ice chips between meals after oral care, with supervision;water between meals after oral care, with supervision  -MB (r) EH (t) MB (c)       Recommended Diagnostics  --  VFSS (MBS)  -MB (r) EH (t) MB (c)       Recommended Precautions and Strategies  upright posture during/after eating  -MB  --       SLP Rec. for Method of Medication Administration  meds crushed;with pudding or applesauce using a chin tuck strategy  -MB  with pudding or applesauce;meds whole;meds crushed  -MB       Monitor for Signs of Aspiration  yes;cough;gurgly voice;throat clearing  -MB  yes;notify SLP if any concerns;cough;throat clearing;gurgly voice;fever  -MB (r) EH (t) MB (c)       Anticipated Dischage Disposition  unknown  -MB  unknown  -MB (r) EH (t) MB (c)          Swallow Goals (SLP)    Oral Nutrition/Hydration Goal Selection (SLP)  oral nutrition/hydration, SLP goal 1  -MB  oral nutrition/hydration, SLP goal 1  -MB (r) EH (t) MB (c)       Pharyngeal Strengthening Exercise Goal Selection (SLP)  pharyngeal strengthening exercise, SLP goal 1  -MB  --       Additional Documentation  pharyngeal strengthening exercise goal selection (SLP)  -MB  --          Oral Nutrition/Hydration Goal 1 (SLP)    Oral Nutrition/Hydration Goal 1, SLP  Pt will tolerate least restrictive diet with no overt s/s of  aspiration.  -MB  Pt will tolerate least restrictive diet with no overt s/s of aspiration.  -MB (r) EH (t) MB (c)       Time Frame (Oral Nutrition/Hydration Goal 1, SLP)  by discharge  -MB  by discharge  -MB (r) EH (t) MB (c)       Barriers (Oral Nutrition/Hydration Goal 1, SLP)  none  -MB  cognitive impairment  -MB (r) EH (t) MB (c)       Progress/Outcomes (Oral Nutrition/Hydration Goal 1, SLP)  goal ongoing  -MB  goal ongoing  -MB (r) EH (t) MB (c)          Pharyngeal Strengthening Exercise Goal 1 (SLP)    Activity (Pharyngeal Strengthening Goal 1, SLP)  increase superior movement of the hyolaryngeal complex;increase anterior movement of the hyolaryngeal complex;increase squeeze/positive pressure generation  -MB  --       Increase Superior Movement of the Hyolaryngeal Complex  Mendelsohn NMES  -MB  --       Increase Anterior Movement of the Hyolaryngeal Complex  shaker  -MB  --       Increase Squeeze/Positive Pressure Generation  hard effortful swallow;marsha  -MB  --       [place]/Accuracy (Pharyngeal Strengthening Goal 1, SLP)  with minimal cues (75-90% accuracy)  -MB  --       Time Frame (Pharyngeal Strengthening Goal 1, SLP)  short term goal (STG);by discharge  -MB  --       Barriers (Pharyngeal Strengthening Goal 1, SLP)  n/a  -MB  --       Progress/Outcomes (Pharyngeal Strengthening Goal 1, SLP)  goal ongoing  -MB  --         User Key  (r) = Recorded By, (t) = Taken By, (c) = Cosigned By    Initials Name Effective Dates    Julia Saenz, CCC-SLP 08/02/16 -     Daphne Mena, Speech Therapy Student 02/27/19 -           EDUCATION  The patient has been educated in the following areas:   Dysphagia (Swallowing Impairment).    SLP Recommendation and Plan  SLP Swallowing Diagnosis: severe, pharyngeal dysfunction  SLP Diet Recommendation: NPO, ice chips between meals after oral care, with supervision, other (see comments)(trial tray of pureed/honey tomorrow)  Recommended Precautions and Strategies:  upright posture during/after eating     Monitor for Signs of Aspiration: yes, cough, gurgly voice, throat clearing     Swallow Criteria for Skilled Therapeutic Interventions Met: demonstrates skilled criteria  Anticipated Dischage Disposition: unknown  Rehab Potential/Prognosis, Swallowing: adequate, monitor progress closely  Therapy Frequency (Swallow): 3 days per week  Predicted Duration Therapy Intervention (Days): until discharge       Plan of Care Reviewed With: patient  Plan of Care Review  Plan of Care Reviewed With: patient  Daily Summary of Progress (SLP): progress toward functional goals is good  Plan for Continued Treatment (SLP): VFSS in radiology today  Progress: improving  Outcome Summary: VFSS in radiology completed. Trials were presented in the following order: honey x2, nectar, pudding x2, honey. Pt exhibited poor hyolaryngeal elevation/excursion, epiglottic inversion, and pharyngeal constriction. With the first honey thick trial the pt passed only a minimal amount of the bolus into the vallecula with profound residue remaining in the pharyngeal area. He cleared some residue when using a chin tuck, but had laryngeal penetration of residue and still had severe residue remaining. With the rest of the trials the pt was instructed to use a chin tuck strategy. He exhibited silent laryngeal penetration during the initial swallow of nectar thick then subsequent silent aspiration during a multiple swallow. He was cued to cough and swallow again, but was still observed to have residue in the laryngeal vestibule and trachea. No definite laryngeal penetration or aspiration was observed with pudding thick, but the pt still had severe pharyngeal residue putting him at an increased risk for aspiration after the swallow. Again, using a chin tuck did seem to help clear more residue, but what was remaining was still of a severe amount. Recommend continuing NPO except for meds crushed in applesauce and using a chin  tuck strategy during swallows. Ok for occasional ice chips with supervision. Recommend attempting trial tray of pureed foods with honey thick liquids and chin tuck strategy with SLP tomorrow to determine if pt may be able to tolerate those consistencies. Otherwise pt may benefit from TAWANDA.     SLP GOALS     Row Name 04/05/19 1504 04/05/19 0952 04/04/19 1340       Oral Nutrition/Hydration Goal 1 (SLP)    Oral Nutrition/Hydration Goal 1, SLP  Pt will tolerate least restrictive diet with no overt s/s of aspiration.  -MB  Pt will tolerate least restrictive diet with no overt s/s of aspiration.  -MB  Pt will tolerate least restrictive diet with no overt s/s of aspiration.  -MB (r) EH (t) MB (c)    Time Frame (Oral Nutrition/Hydration Goal 1, SLP)  by discharge  -MB  by discharge  -MB  by discharge  -MB (r) EH (t) MB (c)    Barriers (Oral Nutrition/Hydration Goal 1, SLP)  none  -MB  none  -MB  cognitive impairment  -MB (r) EH (t) MB (c)    Progress/Outcomes (Oral Nutrition/Hydration Goal 1, SLP)  goal ongoing  -MB  continuing progress toward goal  -MB  goal ongoing  -MB (r) EH (t) MB (c)       Pharyngeal Strengthening Exercise Goal 1 (SLP)    Activity (Pharyngeal Strengthening Goal 1, SLP)  increase superior movement of the hyolaryngeal complex;increase anterior movement of the hyolaryngeal complex;increase squeeze/positive pressure generation  -MB  --  --    Increase Superior Movement of the Hyolaryngeal Complex  Mendelsohn NMES  -MB  --  --    Increase Anterior Movement of the Hyolaryngeal Complex  shaker  -MB  --  --    Increase Squeeze/Positive Pressure Generation  hard effortful swallow;marsha  -MB  --  --    [place]/Accuracy (Pharyngeal Strengthening Goal 1, SLP)  with minimal cues (75-90% accuracy)  -MB  --  --    Time Frame (Pharyngeal Strengthening Goal 1, SLP)  short term goal (STG);by discharge  -MB  --  --    Barriers (Pharyngeal Strengthening Goal 1, SLP)  n/a  -MB  --  --    Progress/Outcomes  (Pharyngeal Strengthening Goal 1, SLP)  goal ongoing  -MB  --  --      User Key  (r) = Recorded By, (t) = Taken By, (c) = Cosigned By    Initials Name Provider Type    Julia Saenz CCC-SLP Speech and Language Pathologist    Daphne Mena, Speech Therapy Student Speech Therapy Student             Time Calculation:   Time Calculation- SLP     Row Name 04/05/19 1634 04/05/19 1016          Time Calculation- SLP    SLP Start Time  1504  -MB  0952  -MB     SLP Stop Time  1620  -MB  1006  -MB     SLP Time Calculation (min)  76 min  -MB  14 min  -MB     SLP Received On  04/05/19  -MB  04/05/19  -MB     SLP Goal Re-Cert Due Date  04/15/19  -MB  --       User Key  (r) = Recorded By, (t) = Taken By, (c) = Cosigned By    Initials Name Provider Type    Julia Saenz CCC-SLP Speech and Language Pathologist          Therapy Charges for Today     Code Description Service Date Service Provider Modifiers Qty    27535909689 HC ST TREATMENT SWALLOW 1 4/5/2019 Julia Nevarez CCC-SLP GN 1    [id number] HC ST MOTION FLUORO EVAL SWALLOW 5 4/5/2019 Julia Nevarez CCC-SLP GN, KX 1               CHRISTINA Cody  4/5/2019

## 2019-04-05 NOTE — PLAN OF CARE
Problem: Patient Care Overview  Goal: Plan of Care Review  Outcome: Ongoing (interventions implemented as appropriate)   04/05/19 1507   Coping/Psychosocial   Plan of Care Reviewed With patient   Plan of Care Review   Progress no change   OTHER   Outcome Summary OT eval completed. Pt alert and oriented to person and place only. Needs cueing for month, year and situation. Pt completed supine<>sit with modAx2. MaxAx2 for scooting up in bed. Severely SOA with minimal activity i.e. ROM/MMT testing and bed mobility. Pt was unable to stand due to weakness. He is expected to require maxA for all ADL due to weakness and deconditioning. Pt will require ongoing skilled OT at discharge in the SNF setting. He is a high fall risk and not safe at this time to return to [facility]. Skilled OT to follow to address strengthening, balance, safety, endurance and activity tolerance for ADL.

## 2019-04-05 NOTE — PLAN OF CARE
Problem: Patient Care Overview  Goal: Plan of Care Review  Outcome: Ongoing (interventions implemented as appropriate)      Problem: Skin Injury Risk (Adult)  Goal: Skin Health and Integrity  Outcome: Ongoing (interventions implemented as appropriate)      Problem: Fall Risk (Adult)  Goal: Absence of Fall  Outcome: Ongoing (interventions implemented as appropriate)      Problem: Sepsis/Septic Shock (Adult)  Goal: Signs and Symptoms of Listed Potential Problems Will be Absent, Minimized or Managed (Sepsis/Septic Shock)  Outcome: Ongoing (interventions implemented as appropriate)      Problem: Dysphagia (Adult)  Goal: Identify Related Risk Factors and Signs and Symptoms  Outcome: Outcome(s) achieved Date Met: 04/05/19    Goal: Functional/Safe Swallow  Outcome: Ongoing (interventions implemented as appropriate)    Goal: Compensatory Techniques to Improve Safety/Function with Swallowing  Outcome: Ongoing (interventions implemented as appropriate)

## 2019-04-05 NOTE — PROGRESS NOTES
Continued Stay Note   Joanne     Patient Name: Vitaly Pantoja  MRN: [id number]  Today's Date: 4/5/2019    Admit Date: 4/1/2019    Discharge Plan     Row Name 04/05/19 1129       Plan    Plan  pending therapy evals post extubation    Plan Comments  Spoke with Lupe in admissions with [facility] and she states that patient does not meet criteria for LTAC at this time.  Patient now extubated and will follow for therapy evaluations to determine if he can return to personal care home or will need SNF.         Discharge Codes    No documentation.             Brandi Delgado RN

## 2019-04-05 NOTE — THERAPY EVALUATION
"Acute Care - Physical Therapy Initial Evaluation  [facility]     Patient Name: Vitaly Pantoja  : 1960  MRN: [id number]  Today's Date: 2019   Onset of Illness/Injury or Date of Surgery: 19  Date of Referral to PT: 19  Referring Physician: Dr. Milligan      Admit Date: 2019    Visit Dx:     ICD-10-CM ICD-9-CM   1. Shock, septic (CMS/HCC) A41.9 038.9    R65.21 785.52     995.92   2. Cardiac arrest (CMS/HCC) I46.9 427.5   3. Oropharyngeal dysphagia R13.12 787.22   4. Decreased activities of daily living (ADL) R68.89 780.99   5. Impaired mobility Z74.09 799.89     Patient Active Problem List   Diagnosis   • Sepsis (CMS/HCC)     Past Medical History:   Diagnosis Date   • Back pain     current back pain per pt   • Chronic rheumatic arthritis (CMS/HCC)    • Diabetes mellitus (CMS/[facility])    • Hip dysplasia, congenital      Past Surgical History:   Procedure Laterality Date   • APPENDECTOMY     • JOINT REPLACEMENT Bilateral     TKA   • TONSILLECTOMY          PT ASSESSMENT (last 12 hours)      Physical Therapy Evaluation     Row Name 19 1352          PT Evaluation Time/Intention    Subjective Information  complains of;weakness;fatigue \"woozy\"  -AIME (r) GR (t) AIME (c)     Document Type  evaluation  -AIME (r) GR (t) AIME (c)     Mode of Treatment  physical therapy;concurrent therapy  -AIME (r) GR (t) AIME (c)     Patient Effort  good  -AIME (r) GR (t) AIME (c)     Symptoms Noted During/After Treatment  fatigue;shortness of breath  -AIME (r) GR (t) AIME (c)     Comment  Pt appears to have memory issues and constantly requests to have a drink of water despite experiencing choking and being told repeatedly that they can't have one. Pt also have several episodes of hyperventilation.   (Significant)   -AIME (r)  AIME (c)     Row Name 19 2498          General Information    Patient Profile Reviewed?  yes  -AIME (r) GR (t) AIME (c)     Onset of Illness/Injury or Date of Surgery  19  -AIME (r) GR (t) AIME (c)     " Referring Physician  Dr. Milligan  -AIME shelton) TONIA (t) AIME (c)     Patient Observations  alert;cooperative;agree to therapy  -AIME (ariel) TONIA (t) AIME (c)     Patient/Family Observations  no family in room at time of approach  -AIME (ariel) TONIA (cary) AIME (sae)     General Observations of Patient  lying in fowlers in bed, fecal management system, gauze dressings to B feet, arias  -AIME (ariel) TONIA (t) AIME (sae)     Prior Level of Function  independent:;all household mobility;gait;transfer;ADL's;cooking;cleaning;driving  -AIME (ariel) TONIA (t) AIME (c)     Equipment Currently Used at Home  walker, rolling;raised toilet;shower chair;grab bar  -AIME shelton) TONIA (cary) AIME (sae)     Pertinent History of Current Functional Problem  transferred from OSH with SOA, hypotension, pneumonia; had episode of cardiac arrest with PEA w/return of spontaneous circulation, intubated 4/1/19, extubated 4/5/19; Dx: septic shock, cardiac arrest with PEA and ROSC, new onset a fib w/RVR, metabolic acidosis, abnormal LFTs, CARLOS, pneumonia, acute on chronic CHF, acute repiratory failure, bacteremia, dysphagia  -AIME (r) TONIA (t) AIME (c)     Existing Precautions/Restrictions  fall;other (see comments)  (Significant)  contact and droplet precautions  -AIME shelton) TONIA (t) AIME (sae)     Limitations/Impairments  safety/cognitive  -AIME (ariel) TONIA (t) AIME (sae)     Risks Reviewed  patient:;LOB;nausea/vomiting;dizziness;increased discomfort;change in vital signs;increased drainage;lines disloged  -AIME (ariel) TONIA (t) AIME (sae)     Benefits Reviewed  patient:;improve function;increase independence;increase balance;increase strength;decrease pain;decrease risk of DVT;improve skin integrity;increase knowledge  -AIME (ariel) TONIA (t) AIME (c)     Barriers to Rehab  medically complex;cognitive status  -AIME (ariel) TONIA (t) AIME (sae)     Row Name 04/05/19 1403          Relationship/Environment    Lives With  facility resident  -AIME shelton) TONIA (t) AIME (sae)     Row Name 04/05/19 1401          Resource/Environmental Concerns    Current Living Arrangements   independent/assisted living facility  -AIME (r) GR (t) AIME (c)     Row Name 04/05/19 1407          Cognitive Assessment/Interventions    Additional Documentation  Cognitive Assessment/Intervention (Group)  -AIME (r) GR (t) AIME (c)     Row Name 04/05/19 1400          Cognitive Assessment/Intervention- PT/OT    Affect/Mental Status (Cognitive)  confused;anxious;sad/depressed affect  -AIME (r) GR (t) AIME (c)     Behavioral Issues (Cognitive)  difficulty managing stress;overwhelmed easily  -AIME (r) GR (t) AIME (c)     Orientation Status (Cognition)  oriented to;person;place  -AIME (r) GR (t) AIME (c)     Follows Commands (Cognition)  WFL;follows one step commands;over 90% accuracy  -AIME (r) GR (t) AIME (c)     Cognitive Function (Cognitive)  memory deficit  -AIME (r) GR (t) AIME (c)     Memory Deficit (Cognitive)  mild deficit;short term memory  -AIME (r) GR (t) AIME (c)     Personal Safety Interventions  fall prevention program maintained;gait belt;muscle strengthening facilitated;nonskid shoes/slippers when out of bed;supervised activity;toileting scheduled  -AIME (r) GR (t) AIME (c)     Row Name 04/05/19 3649          Safety Issues, Functional Mobility    Safety Issues Affecting Function (Mobility)  friction/shear risk;problem solving  -AIME (r) GR (t) AIME (c)     Impairments Affecting Function (Mobility)  balance;cognition;coordination;endurance/activity tolerance;motor control;motor planning;pain;postural/trunk control;range of motion (ROM);shortness of breath;strength  -AIME (r) GR (t) AIME (c)     Row Name 04/05/19 140          Bed Mobility Assessment/Treatment    Bed Mobility Assessment/Treatment  rolling left;scooting/bridging;supine-sit;sit-supine  -AIME (r) GR (t) AIME (c)     Rolling Left [place] (Bed Mobility)  moderate assist (50% patient effort);verbal cues;2 person assist  -AIME (r) GR (t) AIME (c)     Scooting/Bridging [place] (Bed Mobility)  dependent (less than 25% patient effort);2 person assist  -AIME (r) GR (t) AIME (c)     Supine-Sit  [place] (Bed Mobility)  minimum assist (75% patient effort);2 person assist;verbal cues  -AIME (r) GR (t) AIME (c)     Sit-Supine [place] (Bed Mobility)  moderate assist (50% patient effort);2 person assist;nonverbal cues (demo/gesture)  -AIME (r) GR (t) AIME (c)     Bed Mobility, Safety Issues  impaired trunk control for bed mobility  -AIME (r) GR (t) AIME (c)     Assistive Device (Bed Mobility)  bed rails;draw sheet;head of bed elevated  -AIME (r) GR (t) AIME (c)     Row Name 04/05/19 1405          Transfer Assessment/Treatment    Comment (Transfers)  unable to perform  (Significant)  deferred d/t hyperventilation  -AIME (r)  AIME (c)     Row Name 04/05/19 1405          Gait/Stairs Assessment/Training    Comment (Gait/Stairs)  unable to perform  -AIME (r) GR (t) AIME (c)     Row Name 04/05/19 1405          General ROM    GENERAL ROM COMMENTS  BUE screened in OT eval, BLE ROM limited in knee extention and DF  -AIME (r) GR (t) AIME (c)     Row Name 04/05/19 1405          MMT (Manual Muscle Testing)    General MMT Comments  BLE strength grossly 3/5, weakness R>L, able to complete a partial bilateral bridge to assist with scootiong  -AIME (r) GR (t) AIME (c)     Row Name 04/05/19 1405          Motor Assessment/Intervention    Additional Documentation  Balance (Group)  -AIME (r) GR (t) AIME (c)     Row Name 04/05/19 1405          Balance    Balance  static sitting balance;dynamic sitting balance  -AIME (r) GR (t) AIME (c)     Row Name 04/05/19 1405          Static Sitting Balance    Level of [place] (Supported Sitting, Static Balance)  standby assist  -AIME (r) GR (t) AIME (c)     Sitting Position (Supported Sitting, Static Balance)  sitting on edge of bed  -AIME (r) GR (t) AIME (c)     Time Able to Maintain Position (Supported Sitting, Static Balance)  1 to 2 minutes  -AIME (r) GR (t) AIME (c)     Comment (Supported Sitting, Static Balance)  fwd flexed posture, difficulty holding head up  -AIME (r) GR (t) AIME (c)     Row Name 04/05/19 1405          Dynamic  Sitting Balance    Level of [place], Reaches Outside Midline (Sitting, Dynamic Balance)  contact guard assist;minimal assist, 75% patient effort  -AIME (r) GR (t) AIME (c)     Sitting Position, Reaches Outside Midline (Sitting, Dynamic Balance)  sitting on edge of bed  -AIME (r) GR (t) AIME (c)     Comment, Reaches Outside Midline (Sitting, Dynamic Balance)  posterior lean observed with LE ROM testing  -AIME (r) GR (t) AIME (c)     Row Name 04/05/19 1405          Sensory Assessment/Intervention    Sensory General Assessment  no sensation deficits identified per pt report with BLE light touch testing  -AIME (r) GR (t) AIME (c)     Row Name 04/05/19 1405          Pain Assessment    Additional Documentation  Pain Scale: Numbers Pre/Post-Treatment (Group);Pain Scale: FACES Pre/Post-Treatment (Group)  -AIME (r) GR (t) AIME (c)     Row Name 04/05/19 1405          Pain Scale: Numbers Pre/Post-Treatment    Pain Scale: Numbers, Pretreatment  0/10 - no pain  -AIME (r) GR (t) AIME (c)     Pain Scale: Numbers, Post-Treatment  0/10 - no pain  -AIME (r) GR (t) AIME (c)     Row Name 04/05/19 1405          Pain Scale: FACES Pre/Post-Treatment    Pain: FACES Scale, Pretreatment  2-->hurts little bit  -AIME (r) GR (t) AIME (c)     Pain: FACES Scale, Post-Treatment  2-->hurts little bit  -AIME (r) GR (t) AIME (c)     Pre/Post Treatment Pain Comment  pain expression with knee PROM  -AIME (r) GR (t) AIME (c)     Row Name             Wound 04/01/19 1811 Left anterior foot pressure injury    Wound - Properties Group Date first assessed: 04/01/19  -AW Time first assessed: 1811  -AW Present On Admission : picture taken  -AW Side: Left  -AW Orientation: anterior  -AW Location: foot  -AW Type: pressure injury  -AW Stage, Pressure Injury: Stage 3  -AW    Row Name             Wound 04/01/19 1817 Right anterior foot diabetic/neuropathic ulceration    Wound - Properties Group Date first assessed: 04/01/19  -AW Time first assessed: 1817  -AW Present On Admission : yes;picture taken   -AW Side: Right  -AW Orientation: anterior  -AW Location: foot  -AW Type: diabetic/neuropathic ulceration  -AW Stage, Pressure Injury: --  -AW    Row Name 04/05/19 1405          Coping    Observed Emotional State  anxious;cooperative  -AIME (r) TONIA (t) AIME (c)     Verbalized Emotional State  acceptance  -AIME (r) TONIA (t) AIME (c)     Row Name 04/05/19 1405          Plan of Care Review    Plan of Care Reviewed With  patient  -AIME (r) TONIA (t) AIME (c)     Row Name 04/05/19 1507 04/05/19 1405       Physical Therapy Clinical Impression    Date of Referral to PT  --  -AIME  04/05/19  -AIME (r) GR (t) AIME (c)    Patient/Family Goals Statement (PT Clinical Impression)  --  -AIME (r) TONIA (t) AIME (c)  To increase independence with mobility and t/fs  -AIME (r) TONIA (t) AIME (sae)    Criteria for Skilled Interventions Met (PT Clinical Impression)  --  -AIME  yes  -AIME (r) TONIA (t) AIME (c)    Impairments Found (describe specific impairments)  --  -AIME  aerobic capacity/endurance;arousal, attention, and cognition;gait, locomotion, and balance;joint integrity and mobility;motor function;muscle performance;posture;ROM;ventilation and respiration/gas exchange  -AIME (r) GR (t) AIME (c)    Functional Limitations in Following Categories (Describe Specific Limitations)  --  -AIME  self-care;home management  -AIME (r) TONIA (t) AIME (c)    Rehab Potential (PT Clinical Summary)  --  -AIME  fair, will monitor progress closely  -AIME (r) TONIA (t) AIME (c)    Predicted Duration of Therapy (PT)  --  -AIME  until dc  -AIME (r) GR (t) AIME (c)    Care Plan Review (PT)  --  -AIME  evaluation/treatment results reviewed;patient/other agree to care plan  -AIME    Row Name 04/05/19 1507 04/05/19 1407       Physical Therapy Goals    Bed Mobility Goal Selection (PT)  --  -AIME  bed mobility, PT goal 1  -AIME (r) TONIA (cary) AIME (c)    Transfer Goal Selection (PT)  --  -AIME  transfer, PT goal 1  -AIME (r) TONIA (cary) AIME (sae)    Gait Training Goal Selection (PT)  --  -AIME  gait training, PT goal 1  -AIME (r) GR (t) AIME (c)    Balance Goal  Selection (PT)  --  -AIME  balance, PT goal 1  -AIME (r) GR (t) AIME (c)    Additional Documentation  --  -AIME  Balance Goal Selection (PT) (Row)  -AIME (r) GR (t) AIME (c)    Row Name 04/05/19 1507 04/05/19 1407       Bed Mobility Goal 1 (PT)    Activity/Assistive Device (Bed Mobility Goal 1, PT)  --  -AIME  bed mobility activities, all  -AIME (r) GR (t) AIME (c)    [place] Level/Cues Needed (Bed Mobility Goal 1, PT)  --  minimum assist (75% or more patient effort);1 person assist  -AIME (r) GR (t) AIME (c)    Time Frame (Bed Mobility Goal 1, PT)  --  long term goal (LTG);10 days  -AIME (r) GR (t) AIME (c)    Progress/Outcomes (Bed Mobility Goal 1, PT)  --  goal ongoing  -AIME (r) GR (t) AIME (c)    Row Name 04/05/19 9343          Transfer Goal 1 (PT)    Activity/Assistive Device (Transfer Goal 1, PT)  transfers, all  -AIME (r) GR (t) AIME (c)     [place] Level/Cues Needed (Transfer Goal 1, PT)  moderate assist (50-74% patient effort);1 person assist  -AIME (r) GR (t) AIME (c)     Time Frame (Transfer Goal 1, PT)  long term goal (LTG);10 days  -AIME (r) GR (t) AIME (c)     Progress/Outcome (Transfer Goal 1, PT)  goal ongoing  -AIME (r) GR (t) AIME (c)     Row Name 04/05/19 6675          Gait Training Goal 1 (PT)    Activity/Assistive Device (Gait Training Goal 1, PT)  gait (walking locomotion);decrease fall risk;increase endurance/gait distance;increase energy conservation  -AIME (r) GR (t) AIME (c)     [place] Level (Gait Training Goal 1, PT)  moderate assist (50-74% patient effort);1 person assist  -AIME (r) GR (t) AIME (c)     Distance (Gait Goal 1, PT)  ~5 feet to assist with t/f to chair  -AIME (r) GR (t) AIME (c)     Time Frame (Gait Training Goal 1, PT)  long term goal (LTG);10 days  -AIME (r) GR (t) AIME (c)     Progress/Outcome (Gait Training Goal 1, PT)  goal ongoing  -AIME (r) GR (t) AIME (c)     Row Name 04/05/19 1405          Balance Goal 1 (PT)    Activity/Assistive Device (Balance Goal 1, PT)  sitting, static;sitting, dynamic;standing,  static;standing, dynamic;walker, rolling  -AIME (r) GR (t) AIME (c)     [place] Level/Cues Needed (Balance Goal 1, PT)  moderate assist (50-74% patient effort);1 person assist  -AIME (r) GR (t) AIME (c)     Time Frame (Balance Goal 1, PT)  long term goal (LTG);10 days  -AIME (r) GR (t) AIME (c)     Progress/Outcomes (Balance Goal 1, PT)  goal ongoing  -AIME (r) GR (t) AIME (c)     Row Name 04/05/19 1409          Plan for Physical Therapy Intervention    Patient/Family Education  With all goals please teach pt how to manage breathing/hyperventilation - may need to repeat instructions before most activities  (Significant)   -AIME (r)  AIME (c)     Row Name 04/05/19 1404          Positioning and Restraints    Pre-Treatment Position  in bed  -AIME (r) GR (t) AIME (c)     Post Treatment Position  other  -AIME (r) GR (t) AIME (c)     Other Position  with other staff  -AIME (r) GR (t) AIME (c)     Row Name 04/05/19 1407          Living Environment    Home Accessibility  wheelchair accessible;other (see comments) walk in shower  -AIME (r) GR (t) AIME (c)       User Key  (r) = Recorded By, (t) = Taken By, (c) = Cosigned By    Initials Name Provider Type    Fadi Johnson, PT DPT Physical Therapist    Philly Molina, RN Registered Nurse    Shyann Nielsen, PT Student PT Student        Physical Therapy Education     Title: PT OT SLP Therapies (In Progress)     Topic: Physical Therapy (In Progress)     Point: Mobility training (In Progress)     Learning Progress Summary           Patient Acceptance, E, NR by GR at 4/5/2019  3:33 PM    Comment:  Pt educated on POC and benefit of increased mobility. Pt educated throughout eval how to recovery breathe, but continued to demonstrate episodes of hyperventilation.                   Point: Precautions (In Progress)     Learning Progress Summary           Patient Acceptance, E, NR by  at 4/5/2019  3:33 PM    Comment:  Pt educated on POC and benefit of increased mobility. Pt educated throughout eval  how to recovery breathe, but continued to demonstrate episodes of hyperventilation.                               User Key     Initials Effective Dates Name Provider Type Discipline    GR 03/13/19 -  Shyann Maynard, PT Student PT Student PT              PT Recommendation and Plan  Anticipated Discharge Disposition (PT): skilled nursing facility  Planned Therapy Interventions (PT Eval): balance training, bed mobility training, gait training, home exercise program, motor coordination training, neuromuscular re-education, patient/family education, postural re-education, ROM (range of motion), strengthening, stretching, transfer training  Therapy Frequency (PT Clinical Impression): daily  Outcome Summary/Treatment Plan (PT)  Anticipated Discharge Disposition (PT): skilled nursing facility  Plan of Care Reviewed With: patient  Outcome Summary: PT eval completed. Pt alert and oriented to person and place only. Nsg reported that pt has demonstrated memory deficits, and during the eval he required heavy verbal cueing to follow instructions and sequence bed mobility. PT required Sreekanth to modA x2 for rolling to the left, supine<>sit, and sit<>supine, with increases in assistance due to poor motor planning and hyperventilation. Pt demonstrated marked fatigue with bed mobility and we deferred all standing and walking activities due to this and pt weakness. His BLE AROM is limited 25% in knee extension and ankle DF, and his BLE strength is grossly 3/5 with sx R>L. Pt would benefit from skilled PT to address balance impairment, decreased activity tolerance, and LE weakness leading to decreased ability to complete transfers and ADLs. We recommend a SNF to continue rehab on dc from this facility.  Outcome Measures     Row Name 04/05/19 1505 04/05/19 1405          How much help from another person do you currently need...    Turning from your back to your side while in flat bed without using bedrails?  --  2  -AIME (r) GR (t) AIME (c)      Moving from lying on back to sitting on the side of a flat bed without bedrails?  --  2  -AIME (r) GR (t) AIME (c)     Moving to and from a bed to a chair (including a wheelchair)?  --  2  -AIME (r) GR (t) AIME (c)     Standing up from a chair using your arms (e.g., wheelchair, bedside chair)?  --  2  -AIME (r) GR (t) AIME (c)     Climbing 3-5 steps with a railing?  --  1  -AIME (r) GR (t) AIME (c)     To walk in hospital room?  --  1  -AIME (r) GR (t) AIME (c)     AM-[facility] 6 Clicks Score  --  10  -AIME (r) GR (t)        How much help from another is currently needed...    Putting on and taking off regular lower body clothing?  2  -AC  --     Bathing (including washing, rinsing, and drying)  2  -AC  --     Toileting (which includes using toilet bed pan or urinal)  2  -AC  --     Putting on and taking off regular upper body clothing  2  -AC  --     Taking care of personal grooming (such as brushing teeth)  2  -AC  --     Eating meals  3  -AC  --     Score  13  -AC  --        Functional Assessment    Outcome Measure Options  AM-PAC 6 Clicks Daily Activity (OT)  -AC  AM-[facility] 6 Clicks Basic Mobility (PT)  -AIME (r) GR (t) AIME (c)       User Key  (r) = Recorded By, (t) = Taken By, (c) = Cosigned By    Initials Name Provider Type    AC Lisandro Gaxiola, OTR/L Occupational Therapist    Fadi Johnson, PT DPT Physical Therapist    Shyann Nielsen, PT Student PT Student         Time Calculation:   PT Charges     Row Name 04/05/19 1516             Time Calculation    Start Time  1405  -AIME (r) GR (t) AIME (c)      Stop Time  1500  -AIME (r) GR (t) AIME (c)      Time Calculation (min)  55 min  -AIME (r) GR (t)      PT Received On  04/05/19  -AIME (r) GR (t) AIME (c)      PT Goal Re-Cert Due Date  04/15/19  -AIME (r) GR (t) AIME (c)        User Key  (r) = Recorded By, (t) = Taken By, (c) = Cosigned By    Initials Name Provider Type    Fadi Johnson, PT DPT Physical Therapist    Shyann Nielsen, PT Student PT Student        Therapy Charges for Today      Code Description Service Date Service Provider Modifiers Qty    80489021130 HC PT EVAL MOD COMPLEXITY 4 4/5/2019 Shyann Maynard, PT Student GP, KX 1          PT G-Codes  Outcome Measure Options: AM-PAC 6 Clicks Daily Activity (OT)  AM-PAC 6 Clicks Score: 10  Score: 13      Shyann Maynard, PT Student  4/5/2019

## 2019-04-05 NOTE — THERAPY TREATMENT NOTE
Acute Care - Speech Language Pathology   Swallow Treatment Note [facility]     Patient Name: Vitaly Pantoja  : 1960  MRN: [id number]  Today's Date: 2019               Admit Date: 2019  Pt sounds less audibly congested today as compared to yesterday. He completed trials of regular solids and thin liquids. He had a 1x delayed cough with thin. He had moderate difficulty with mastication of the regular solid requiring a thin liquid wash to soften the bolus. Recommend VFSS in radiology to further assess swallow function prior to initiating PO diet.   CHRISTINA Cody 2019 10:17 AM    Visit Dx:      ICD-10-CM ICD-9-CM   1. Shock, septic (CMS/formerly [facility]) A41.9 038.9    R65.21 785.52     995.92   2. Cardiac arrest (CMS/formerly [facility]) I46.9 427.5   3. Oropharyngeal dysphagia R13.12 787.22     Patient Active Problem List   Diagnosis   • Sepsis (CMS/formerly [facility])       Therapy Treatment  Rehabilitation Treatment Summary     Row Name 19 0952             Treatment Time/Intention    Discipline  speech language pathologist  -MB      Document Type  therapy note (daily note)  -MB      Subjective Information  no complaints  -MB      Mode of Treatment  speech-language pathology  -MB      Patient/Family Observations  No family present  -MB      Recorded by [MB] Julia Nevarez CCC-SLP 19 1010      Row Name 19 0952             Pain Scale: FACES Pre/Post-Treatment    Pain: FACES Scale, Pretreatment  0-->no hurt  -MB      Recorded by [MB] Julia Nevarez CCC-SLP 19 1010      Row Name                Wound 19 Left anterior foot pressure injury    Wound - Properties Group Date first assessed: 19 [AW] Time first assessed:  [AW] Present On Admission : picture taken [AW] Side: Left [AW] Orientation: anterior [AW] Location: foot [AW] Type: pressure injury [AW] Stage, Pressure Injury: Stage 3 [AW] Recorded by:  [AW] Philly Palma RN 19    Row Name                Wound  04/01/19 1817 Right anterior foot diabetic/neuropathic ulceration    Wound - Properties Group Date first assessed: 04/01/19 [AW] Time first assessed: 1817 [AW] Present On Admission : yes;picture taken [AW] Side: Right [AW] Orientation: anterior [AW] Location: foot [AW] Type: diabetic/neuropathic ulceration [AW2] Stage, Pressure Injury: -- [AW2] Recorded by:  [AW] Philly Palma RN 04/01/19 1817 [AW2] Philly Palma RN 04/02/19 1247    Row Name 04/05/19 0952             Outcome Summary/Treatment Plan (SLP)    Daily Summary of Progress (SLP)  progress toward functional goals is good  -MB      Barriers to Overall Progress (SLP)  none  -MB      Plan for Continued Treatment (SLP)  VFSS in radiology today  -MB      Anticipated Dischage Disposition  unknown  -MB      Recorded by [MB] Julia Nevarez CCC-SLP 04/05/19 1010        User Key  (r) = Recorded By, (t) = Taken By, (c) = Cosigned By    Initials Name Effective Dates Discipline    MB Julia Nevarez CCC-SLP 08/02/16 -  SLP    AW Philly Palma RN 08/02/16 -  Nurse          Outcome Summary  Outcome Summary/Treatment Plan (SLP)  Daily Summary of Progress (SLP): progress toward functional goals is good (04/05/19 0952 : Julia Nevarez CCC-SLP)  Barriers to Overall Progress (SLP): none (04/05/19 0952 : Julia Nevarez CCC-SLP)  Plan for Continued Treatment (SLP): VFSS in radiology today (04/05/19 0952 : Julia Nevarez CCC-SLP)  Anticipated Dischage Disposition: unknown (04/05/19 0952 : Julia Nevarez CCC-SLP)      SLP GOALS     Row Name 04/05/19 0952 04/04/19 1340          Oral Nutrition/Hydration Goal 1 (SLP)    Oral Nutrition/Hydration Goal 1, SLP  Pt will tolerate least restrictive diet with no overt s/s of aspiration.  -MB  Pt will tolerate least restrictive diet with no overt s/s of aspiration.  -MB (r) EH (t) MB (c)     Time Frame (Oral Nutrition/Hydration Goal 1, SLP)  by discharge  -MB  by discharge  -MB (r) EH (t) MB (c)      Barriers (Oral Nutrition/Hydration Goal 1, SLP)  none  -MB  cognitive impairment  -MB (r) EH (t) MB (c)     Progress/Outcomes (Oral Nutrition/Hydration Goal 1, SLP)  continuing progress toward goal  -MB  goal ongoing  -MB (r) EH (t) MB (c)       User Key  (r) = Recorded By, (t) = Taken By, (c) = Cosigned By    Initials Name Provider Type    Julia Saenz CCC-SLP Speech and Language Pathologist     Daphne Staples, Speech Therapy Student Speech Therapy Student          EDUCATION  The patient has been educated in the following areas:   Dysphagia (Swallowing Impairment).    SLP Recommendation and Plan  Daily Summary of Progress (SLP): progress toward functional goals is good  Barriers to Overall Progress (SLP): none  Plan for Continued Treatment (SLP): VFSS in radiology today  Anticipated Dischage Disposition: unknown                    Time Calculation:   Time Calculation- SLP     Row Name 04/05/19 1016             Time Calculation- SLP    SLP Start Time  0952  -MB      SLP Stop Time  1006  -MB      SLP Time Calculation (min)  14 min  -MB      SLP Received On  04/05/19  -MB        User Key  (r) = Recorded By, (t) = Taken By, (c) = Cosigned By    Initials Name Provider Type    Julia Saenz CCC-SLP Speech and Language Pathologist          Therapy Charges for Today     Code Description Service Date Service Provider Modifiers Qty    [id number] HC ST TREATMENT SWALLOW 1 4/5/2019 Julia Nevarez CCC-SLP GN 1                 CHRISTINA Cody  4/5/2019

## 2019-04-05 NOTE — PLAN OF CARE
Problem: Patient Care Overview  Goal: Plan of Care Review  Outcome: Ongoing (interventions implemented as appropriate)   04/05/19 1087   Coping/Psychosocial   Plan of Care Reviewed With patient   OTHER   Outcome Summary PT eval completed. Pt alert and oriented to person and place only. Nsg reported that pt has demonstrated memory deficits, and during the eval he required heavy verbal cueing to follow instructions and sequence bed mobility. PT required Sreekanth to modA x2 for rolling to the left, supine<>sit, and sit<>supine, with increases in assistance due to poor motor planning and hyperventilation. Pt demonstrated marked fatigue with bed mobility and we deferred all standing and walking activities due to this and pt weakness. His BLE AROM is limited 25% in knee extension and ankle DF, and his BLE strength is grossly 3/5 with sx R>L. Pt would benefit from skilled PT to address balance impairment, decreased activity tolerance, and LE weakness leading to decreased ability to complete transfers and ADLs. We recommend a SNF to continue rehab on dc from this facility.

## 2019-04-05 NOTE — PLAN OF CARE
Problem: Patient Care Overview  Goal: Plan of Care Review  Outcome: Ongoing (interventions implemented as appropriate)   04/05/19 1011   Coping/Psychosocial   Plan of Care Reviewed With patient   Plan of Care Review   Progress improving   OTHER   Outcome Summary Pt sounds less audibly congested today as compared to yesterday. He completed trials of regular solids and thin liquids. He had a 1x delayed cough with thin. He had moderate difficulty with mastication of the regular solid requiring a thin liquid wash to soften the bolus. Recommend VFSS in radiology to further assess swallow function prior to initiating PO diet.

## 2019-04-05 NOTE — PLAN OF CARE
Problem: Patient Care Overview  Goal: Plan of Care Review  Outcome: Ongoing (interventions implemented as appropriate)   04/05/19 1631   Coping/Psychosocial   Plan of Care Reviewed With patient   OTHER   Outcome Summary VFSS in radiology completed. Trials were presented in the following order: honey x2, nectar, pudding x2, honey. Pt exhibited poor hyolaryngeal elevation/excursion, epiglottic inversion, and pharyngeal constriction. With the first honey thick trial the pt passed only a minimal amount of the bolus into the vallecula with profound residue remaining in the pharyngeal area. He cleared some residue when using a chin tuck, but had laryngeal penetration of residue and still had severe residue remaining. With the rest of the trials the pt was instructed to use a chin tuck strategy. He exhibited silent laryngeal penetration during the initial swallow of nectar thick then subsequent silent aspiration during a multiple swallow. He was cued to cough and swallow again, but was still observed to have residue in the laryngeal vestibule and trachea. No definite laryngeal penetration or aspiration was observed with pudding thick, but the pt still had severe pharyngeal residue putting him at an increased risk for aspiration after the swallow. Again, using a chin tuck did seem to help clear more residue, but what was remaining was still of a severe amount. Recommend continuing NPO except for meds crushed in applesauce and using a chin tuck strategy during swallows. Ok for occasional ice chips with supervision. Recommend attempting trial tray of pureed foods with honey thick liquids and chin tuck strategy with SLP tomorrow to determine if pt may be able to tolerate those consistencies. Otherwise pt may benefit from TAWANDA.

## 2019-04-05 NOTE — THERAPY EVALUATION
"Acute Care - Occupational Therapy Initial Evaluation  [facility]     Patient Name: Vitaly Pantoja  : 1960  MRN: [id number]  Today's Date: 2019  Onset of Illness/Injury or Date of Surgery: 19  Date of Referral to OT: 19  Referring Physician: Dr. Milligan    Admit Date: 2019       ICD-10-CM ICD-9-CM   1. Shock, septic (CMS/HCC) A41.9 038.9    R65.21 785.52     995.92   2. Cardiac arrest (CMS/HCC) I46.9 427.5   3. Oropharyngeal dysphagia R13.12 787.22   4. Decreased activities of daily living (ADL) R68.89 780.99     Patient Active Problem List   Diagnosis   • Sepsis (CMS/HCC)     Past Medical History:   Diagnosis Date   • Back pain     current back pain per pt   • Chronic rheumatic arthritis (CMS/[facility])    • Diabetes mellitus (CMS/[facility])    • Hip dysplasia, congenital      Past Surgical History:   Procedure Laterality Date   • APPENDECTOMY     • JOINT REPLACEMENT Bilateral     TKA   • TONSILLECTOMY            OT ASSESSMENT FLOWSHEET (last 12 hours)      Occupational Therapy Evaluation     Row Name 19 1400                   OT Evaluation Time/Intention    Subjective Information  complains of;weakness;fatigue \"woozy\"  -        Document Type  evaluation  -        Patient Effort  good  -        Symptoms Noted During/After Treatment  fatigue;shortness of breath  -           General Information    Patient Profile Reviewed?  yes  -AC        Onset of Illness/Injury or Date of Surgery  19  -        Referring Physician  Dr. Milligan  -        Patient Observations  alert;cooperative;agree to therapy  -        Patient/Family Observations  no family present  -        General Observations of Patient  lying in fowlers in bed, fecal management system, gauze dressings to B feet, arias  -        Prior Level of Function  independent:;all household mobility;gait;transfer;ADL's;dependent:;home management;cooking;cleaning;driving  -        Equipment Currently Used at Home  walker, " rolling;raised toilet;shower chair;grab bar  -AC        Pertinent History of Current Functional Problem  transferred from OSH with SOA, hypotension, pneumonia; had episode of cardiac arrest with PEA w/return of spontaneous circulation, intubated 4/1/19, extubated 4/5/19; Dx: septic shock, cardiac arrest with PEA and ROSC, new onset a fib w/RVR, metabolic acidosis, abnormal LFTs, CARLOS, pneumonia, acute on chronic CHF, acute repiratory failure, bacteremia, dysphagia  -AC        Existing Precautions/Restrictions  fall  (Significant)  contact and droplet precautions  -AC        Limitations/Impairments  safety/cognitive  -AC        Risks Reviewed  patient:;LOB;nausea/vomiting;dizziness;increased discomfort;change in vital signs;increased drainage  -AC        Benefits Reviewed  patient:;improve function;increase independence;increase strength;increase balance;decrease pain;decrease risk of DVT;improve skin integrity;increase knowledge  -AC        Barriers to Rehab  medically complex;cognitive status  -AC           Relationship/Environment    Lives With  facility resident  -AC           Resource/Environmental Concerns    Current Living Arrangements  independent/assisted living facility  -           Cognitive Assessment/Interventions    Additional Documentation  Cognitive Assessment/Intervention (Group)  -           Cognitive Assessment/Intervention- PT/OT    Orientation Status (Cognition)  oriented to;person;place  -AC        Follows Commands (Cognition)  WNL;follows one step commands;over 90% accuracy  -        Personal Safety Interventions  fall prevention program maintained;gait belt;muscle strengthening facilitated;nonskid shoes/slippers when out of bed;supervised activity;toileting scheduled  -AC           Safety Issues, Functional Mobility    Impairments Affecting Function (Mobility)  balance;cognition;coordination;endurance/activity tolerance;grasp;postural/trunk control;range of motion (ROM);shortness of  breath;strength  -AC           Bed Mobility Assessment/Treatment    Bed Mobility Assessment/Treatment  supine-sit;sit-supine;scooting/bridging  -        Scooting/Bridging [place] (Bed Mobility)  maximum assist (25% patient effort);2 person assist;verbal cues;nonverbal cues (demo/gesture)  -        Supine-Sit [place] (Bed Mobility)  minimum assist (75% patient effort);2 person assist;verbal cues  -        Sit-Supine [place] (Bed Mobility)  moderate assist (50% patient effort);2 person assist;verbal cues;nonverbal cues (demo/gesture)  -        Assistive Device (Bed Mobility)  bed rails;draw sheet;head of bed elevated  -           Functional Mobility    Functional Mobility- Ind. Level  unable to perform  -           Transfer Assessment/Treatment    Comment (Transfers)  unable to perform  -           ADL Assessment/Intervention    BADL Assessment/Intervention  other (see comments) expected level of performance for ADL mod to maxA  -           BADL Safety/Performance    Impairments, BADL Safety/Performance  balance;endurance/activity tolerance;grasp/prehension;pain;range of motion;shortness of breath;strength;trunk/postural control  -           General ROM    GENERAL ROM COMMENTS  B shoulders impaired 75%, all other B UE joints WFL AROM  -AC           MMT (Manual Muscle Testing)    General MMT Comments  4/5 BUE  -           Motor Assessment/Interventions    Additional Documentation  Balance (Group)  -           Balance    Balance  static sitting balance;static standing balance  -           Static Sitting Balance    Level of [place] (Supported Sitting, Static Balance)  standby assist  -        Sitting Position (Supported Sitting, Static Balance)  sitting on edge of bed  -           Dynamic Sitting Balance    Level of [place], Reaches Outside Midline (Sitting, Dynamic Balance)  contact guard assist;minimal assist, 75% patient effort  -        Sitting Position, Reaches  Outside Midline (Sitting, Dynamic Balance)  sitting on edge of bed  -AC        Comment, Reaches Outside Midline (Sitting, Dynamic Balance)  posterior lean observed with LE ROM testing  -AC           Sensory Assessment/Intervention    Sensory General Assessment  no sensation deficits identified  -AC           Positioning and Restraints    Pre-Treatment Position  in bed  -AC        Post Treatment Position  other  -AC        Other Position  with other staff  -AC           Pain Assessment    Additional Documentation  Pain Scale: Numbers Pre/Post-Treatment (Group)  -AC           Pain Scale: Numbers Pre/Post-Treatment    Pain Scale: Numbers, Pretreatment  0/10 - no pain  -AC        Pain Scale: Numbers, Post-Treatment  0/10 - no pain  -AC           Wound 04/01/19 1811 Left anterior foot pressure injury    Wound - Properties Group Date first assessed: 04/01/19  -AW Time first assessed: 1811  -AW Present On Admission : picture taken  -AW Side: Left  -AW Orientation: anterior  -AW Location: foot  -AW Type: pressure injury  -AW Stage, Pressure Injury: Stage 3  -AW       Wound 04/01/19 1817 Right anterior foot diabetic/neuropathic ulceration    Wound - Properties Group Date first assessed: 04/01/19  -AW Time first assessed: 1817  -AW Present On Admission : yes;picture taken  -AW Side: Right  -AW Orientation: anterior  -AW Location: foot  -AW Type: diabetic/neuropathic ulceration  -AW Stage, Pressure Injury: --  -AW       Plan of Care Review    Plan of Care Reviewed With  patient  -AC           Clinical Impression (OT)    Date of Referral to OT  04/05/19  -AC        OT Diagnosis  decreased adl  -AC        Prognosis (OT Eval)  good  -AC        Criteria for Skilled Therapeutic Interventions Met (OT Eval)  yes;treatment indicated  -AC        Rehab Potential (OT Eval)  good, to achieve stated therapy goals  -AC        Therapy Frequency (OT Eval)  daily  -AC        Predicted Duration of Therapy Intervention (Therapy Eval)  10 days   -AC        Care Plan Review (OT)  evaluation/treatment results reviewed;care plan/treatment goals reviewed;risks/benefits reviewed;current/potential barriers reviewed;patient/other agree to care plan  -AC        Anticipated Discharge Disposition (OT)  skilled nursing facility  -AC           Planned OT Interventions    Planned Therapy Interventions (OT Eval)  activity tolerance training;BADL retraining;functional balance retraining;occupation/activity based interventions;patient/caregiver education/training;ROM/therapeutic exercise;strengthening exercise;transfer/mobility retraining  -AC           OT Goals    Dressing Goal Selection (OT)  dressing, OT goal 1  -AC        Strength Goal Selection (OT)  strength, OT goal 1  -AC        Endurance Goal Selection (OT)  endurance, OT goal 1  -AC        Activity Tolerance Goal Selection (OT)  --  -AC        Additional Documentation  Strength Goal Selection (OT) (Row);Activity Tolerance Goal Selection (OT) (Row);Endurance Goal Selection (OT) (Row)  -AC           Dressing Goal 1 (OT)    Activity/Assistive Device (Dressing Goal 1, OT)  lower body dressing  -AC        [place]/Cues Needed (Dressing Goal 1, OT)  minimum assist (75% or more patient effort)  -AC        Time Frame (Dressing Goal 1, OT)  long term goal (LTG);10 days  -AC        Progress/Outcome (Dressing Goal 1, OT)  goal ongoing  -AC           Strength Goal 1 (OT)    Strength Goal 1 (OT)  pt will increase B UE strength to 4+/5 to increase independence with ADL  -AC        Time Frame (Strength Goal 1, OT)  long term goal (LTG);10 days  -AC        Progress/Outcome (Strength Goal 1, OT)  goal ongoing  -AC            Endurance Goal 1 (OT)    Activity Level (Endurance Goal 1, OT)  endurance 2 fair +  -AC        Time Frame (Endurance Goal 1, OT)  long term goal (LTG);10 days  -AC        Progress/Outcome (Endurance Goal 1, OT)  goal ongoing  -AC           Living Environment    Home Accessibility  wheelchair  accessible;other (see comments) walk in shower  -          User Key  (r) = Recorded By, (t) = Taken By, (c) = Cosigned By    Initials Name Effective Dates     MinorLisandro GHISLAINE, OTR/L 11/29/18 -     Philly Molina, RN 08/02/16 -                OT Recommendation and Plan  Outcome Summary/Treatment Plan (OT)  Anticipated Discharge Disposition (OT): skilled nursing facility  Planned Therapy Interventions (OT Eval): activity tolerance training, BADL retraining, functional balance retraining, occupation/activity based interventions, patient/caregiver education/training, ROM/therapeutic exercise, strengthening exercise, transfer/mobility retraining  Therapy Frequency (OT Eval): daily  Plan of Care Review  Plan of Care Reviewed With: patient  Plan of Care Reviewed With: patient  Outcome Summary: OT eval completed.  Pt alert and oriented to person and place only.  Needs cueing for month, year and situation.  Pt completed supine<>sit with modAx2.  MaxAx2 for scooting up in bed.  Severely SOA with minimal activity i.e. ROM/MMT testing and bed mobility.  Pt was unable to stand due to weakness.  He is expected to require maxA for all ADL due to weakness and deconditioning.  Pt will require ongoing skilled OT at discharge in the SNF setting.  He is a high fall risk and not safe at this time to return to [facility].  Skilled OT to follow to address strengthening, balance, safety, endurance and activity tolerance for ADL.    Outcome Measures     Row Name 04/05/19 9919             How much help from another is currently needed...    Putting on and taking off regular lower body clothing?  2  -AC      Bathing (including washing, rinsing, and drying)  2  -AC      Toileting (which includes using toilet bed pan or urinal)  2  -AC      Putting on and taking off regular upper body clothing  2  -AC      Taking care of personal grooming (such as brushing teeth)  2  -AC      Eating meals  3  -AC      Score  13  -AC          Functional Assessment    Outcome Measure Options  AM-PAC 6 Clicks Daily Activity (OT)  -AC        User Key  (r) = Recorded By, (t) = Taken By, (c) = Cosigned By    Initials Name Provider Type     Lisandro Gaxiola OTR/L Occupational Therapist          Time Calculation:   Time Calculation- OT     Row Name 04/05/19 1506             Time Calculation- OT    OT Start Time  1400  -AC      OT Stop Time  1500  -AC      OT Time Calculation (min)  60 min  -AC      OT Received On  04/05/19  -      OT Goal Re-Cert Due Date  04/15/19  -         OT KX Modifier    Exception criteria met to exceed therapy cap  Apply KX Modifier  -AC        User Key  (r) = Recorded By, (t) = Taken By, (c) = Cosigned By    Initials Name Provider Type    Lisandro Schultz OTR/L Occupational Therapist        Therapy Charges for Today     Code Description Service Date Service Provider Modifiers Qty    [id number] INACTIVE - HC OT SELFCARE CURRENT 4/5/2019 Lisandro Gaxiola OTR/L  1    [id number] INACTIVE - HC OT SELFCARE PROJECTED 4/5/2019 Lisandro Gaxiola OTR/L  1    [id number] HC OT EVAL HIGH COMPLEXITY 4 4/5/2019 Lisandro Gaxiola OTR/L GO, KX 1          OT G-codes  OT Professional Judgement Used?: Yes  OT Functional Scales Options: AM-PAC 6 Clicks Daily Activity (OT)  Score: 13  Functional Limitation: Self care  Self Care Current Status (): At least 60 percent but less than 80 percent impaired, limited or restricted  Self Care Goal Status (): At least 40 percent but less than 60 percent impaired, limited or restricted    Lisandro Gaxiola OTR/L  4/5/2019

## 2019-04-05 NOTE — PROGRESS NOTES
[facility] Medicine Services  INPATIENT PROGRESS NOTE    Patient Name: Vitaly Pantoja  Date of Admission: 4/1/2019  Today's Date: 04/05/19  Length of Stay: 4  Primary Care Physician: Emerson Sandhu MD    Subjective   Chief Complaint: Thirsty  HPI   Patient reports that he is doing well this morning.  Reports that he is thirsty and would like some water to drink.  He is currently on no supplemental oxygen.  He denies having any pain.  Segal catheter remains in place.  No acute events reported from overnight.  Patient has been in A. fib with heart rates in the low 100s.  He denies any chest pain or palpitations.  He denies any shortness of breath at this time.    Review of Systems     All pertinent negatives and positives are as above. All other systems have been reviewed and are negative unless otherwise stated.     Objective    Temp:  [96.4 °F (35.8 °C)-98.1 °F (36.7 °C)] 97.2 °F (36.2 °C)  Heart Rate:  [] 111  Resp:  [13-24] 22  BP: (114-157)/() 133/95  Arterial Line BP: (134-168)/(64-76) 168/76  FiO2 (%):  [30 %] 30 %  Physical Exam   Constitutional: No distress.   HENT:   Head: Normocephalic.   Mouth/Throat: No oropharyngeal exudate (poor dentition).   Eyes: Pupils are equal, round, and reactive to light. No scleral icterus.   Neck: No tracheal deviation present.   Cardiovascular:   Irregular; rate approx 110   Pulmonary/Chest: Effort normal. No respiratory distress.   Slightly diminished at bases; on no supplemental 02 at this time   Abdominal: Soft. He exhibits no distension. There is no tenderness.   Genitourinary:   Genitourinary Comments: Segal in place   Musculoskeletal: He exhibits no edema.   Neurological: He is alert.   Skin: Skin is warm. He is not diaphoretic.   Dressing in place to bilateral feet (C/D/I)   Psychiatric: He has a normal mood and affect. His behavior is normal.   Vitals reviewed.    Results Review:  I have reviewed the labs,  radiology results, and diagnostic studies.    Laboratory Data:   Results from last 7 days   Lab Units 04/03/19  0258 04/02/19  0236 04/01/19  1625   WBC 10*3/mm3 21.41* 32.39* 26.16*   HEMOGLOBIN g/dL 10.7* 11.7* 10.1*   HEMATOCRIT % 31.0* 35.0* 30.1*   PLATELETS 10*3/mm3 275 267 195        Results from last 7 days   Lab Units 04/03/19  0258 04/02/19  0236 04/01/19  1625   SODIUM mmol/L 140 135 129*   POTASSIUM mmol/L 3.5 5.6* 5.2   CHLORIDE mmol/L 101 101 97*   CO2 mmol/L 27.0 15.0* 20.0*   BUN mg/dL 49* 60* 59*   CREATININE mg/dL 1.30 2.45* 2.32*   CALCIUM mg/dL 7.0* 7.0* 7.2*   BILIRUBIN mg/dL 0.6 1.2* 1.1*   ALK PHOS U/L 168* 223* 149*   ALT (SGPT) U/L 56* 67* 26   AST (SGOT) U/L 42 158* 43   GLUCOSE mg/dL 402* 213* 128*       Culture Data:   Blood Culture   Date Value Ref Range Status   04/03/2019 No growth at 24 hours  Preliminary   04/01/2019 No growth at 3 days  Preliminary   04/01/2019 No growth at 3 days  Preliminary     Urine Culture   Date Value Ref Range Status   04/01/2019 No growth at 2 days  Final     Respiratory Culture   Date Value Ref Range Status   04/01/2019 Heavy growth (4+) Normal Respiratory Danica  Final   04/01/2019 Moderate growth (3+) Staphylococcus aureus, MRSA (A)  Final     Comment:       Methicillin resistant Staphylococcus aureus, Patient may be an isolation risk.       Radiology Data:   Imaging Results (last 24 hours)     Procedure Component Value Units Date/Time    XR Chest 1 View [[id number]] Collected:  04/04/19 0728     Updated:  04/04/19 0732    Narrative:       EXAMINATION: XR CHEST 1 VW- 4/4/2019 7:28 AM CDT     HISTORY: intubated; A41.9-Sepsis, unspecified organism; R65.21-Severe  sepsis with septic shock; I46.9-Cardiac arrest, cause unspecified.     REPORT: A frontal view the chest is compared with the previous exam  04/03/2019 0319 hours.     The endotracheal tube appears in satisfactory position with the tip  approximately 4.4 cm superior to the aki. The nasogastric tube  and  right internal jugular central line appear to be in good position. Lungs  are hypoaerated, there is central vascular congestion with mild  improvement in pulmonary edema. Heart size is normal. No pneumothorax is  identified. No definite pleural effusion is seen. The osseous structures  are unremarkable.       Impression:       Stable satisfactory position of the life-support lines.  Improvement in pulmonary edema and volume overload is noted. Continued  follow-up is recommended.  This report was finalized on 04/04/2019 07:29 by Dr. Catrachito Hobson MD.          I have reviewed the patient's current medications.     Assessment/Plan     Active Hospital Problems    Diagnosis   • Sepsis (CMS/HCC)     1.  Septic Shock  2.  PEA Cardiac Arrest s/p ROSC  3.  New onset atrial fibrillation with RVR  4.  Metabolic Acidosis due to Lactic Acidosis - improved  5.  Abnormal LFTs/transaminitis - likely exac by #1  6.  Acute Kidney Injury due to #1  7.  Hyperkalemia  8.  Pneumonia - Staphylococcus on respiratory culture (MRSA)  9.  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  Acute respiratory failure due to #1 - extubated 4/4/2019  11.  Bacteremia due to beta hemolytic Streptococcus - noted from OSH prior to transfer  12.  Diabetes mellitus - complicated by hyperglycemia with hemoglobin A1c 9.1; also with peripheral neuropathy  13.  Dysphagia    Plan:  1.  Continue IV Vanco  2.  D/C Cefepime  3.  PO Amio; add PO metoprolol  4.  CBC and CMP today  5.  Continue wound care  6.  D/C Segal  7.  Speech therapy following  8.  Off of all pressors  9.  D/C Albuterol; continue Atrovent  10.  Now back to room air with no supplemental 02 requirement  11.  Currently on Lovenox at PPx dose; labs pending; will need to discuss anticoagulation in setting of AFIB    12.  Hope to restart low dose ACE I soon  13.  SSI coverage for now; A1c is 9.1  14.  Nutrition consultation  15.  Surveillance blood cultures negative  16.   Dispo planning  17.  Anticipate we can transfer to medical floor today  18.  PT assessment    Alonzo Milligan MD   04/05/19   7:03 AM

## 2019-04-06 NOTE — PROGRESS NOTES
"    [facility] Medicine Services  INPATIENT PROGRESS NOTE    Patient Name: Vitaly Pantoja  Date of Admission: 4/1/2019  Today's Date: 04/06/19  Length of Stay: 5  Primary Care Physician: Emerson Sandhu MD    Subjective   Chief Complaint: Thirsty  HPI   Patient reports that he is thirsty.  He denies feeling shortness of breath.  He denies pain.  Nursing reports that overnight his O2 saturations were checked and were found to be approximately 86-87% on room air.  He was placed on supplemental oxygen via nasal cannula at that time.  Condom catheter is in place.  Fecal management system also in place.  Patient reports no new complaints today, he is not a good historian at baseline - he did say \"thank you\" as I was leaving his room this morning.    Review of Systems     All pertinent negatives and positives are as above. All other systems have been reviewed and are negative unless otherwise stated.     Objective    Temp:  [97.4 °F (36.3 °C)-98.2 °F (36.8 °C)] 98.2 °F (36.8 °C)  Heart Rate:  [] 100  Resp:  [17-21] 18  BP: (130-168)/(60-90) 168/80  Physical Exam   Constitutional: No distress.   HENT:   Head: Normocephalic.   Mouth/Throat: No oropharyngeal exudate (poor dentition).   Eyes: Pupils are equal, round, and reactive to light. No scleral icterus.   Neck: No tracheal deviation present.   Cardiovascular:   Irregular; rate approx 100   Pulmonary/Chest: Effort normal. No respiratory distress.   Continues to be diminished at bases; on 3L NC at this time   Abdominal: Soft. He exhibits no distension. There is no tenderness.   Genitourinary:   Genitourinary Comments: Condom catheter and fecal management system in place   Musculoskeletal: He exhibits no edema.   Neurological: He is alert.   Skin: Skin is warm. He is not diaphoretic.   Dressing in place to bilateral feet (C/D/I) - dressing changed this AM   Psychiatric: He has a normal mood and affect. His behavior is normal. "   Vitals reviewed.    Results Review:  I have reviewed the labs, radiology results, and diagnostic studies.    Laboratory Data:   Results from last 7 days   Lab Units 04/06/19  0410 04/05/19  0740 04/03/19  0258   WBC 10*3/mm3 12.20* 8.43 21.41*   HEMOGLOBIN g/dL 11.1* 11.0* 10.7*   HEMATOCRIT % 34.6* 33.9* 31.0*   PLATELETS 10*3/mm3 258 214 275        Results from last 7 days   Lab Units 04/06/19  0410 04/05/19  1550 04/05/19  0740 04/03/19  0258 04/02/19  0236   SODIUM mmol/L 140 140 144 140 135   POTASSIUM mmol/L 3.2* 3.1* 2.7* 3.5 5.6*   CHLORIDE mmol/L 98 97* 101 101 101   CO2 mmol/L 31.0 34.0* 34.0* 27.0 15.0*   BUN mg/dL 13 17 19 49* 60*   CREATININE mg/dL 0.56 0.67 0.56 1.30 2.45*   CALCIUM mg/dL 7.4* 7.8* 7.7* 7.0* 7.0*   BILIRUBIN mg/dL  --   --  0.8 0.6 1.2*   ALK PHOS U/L  --   --  121* 168* 223*   ALT (SGPT) U/L  --   --  39 56* 67*   AST (SGOT) U/L  --   --  27 42 158*   GLUCOSE mg/dL 203* 235* 177* 402* 213*       Culture Data:   Blood Culture   Date Value Ref Range Status   04/03/2019 No growth at 24 hours  Preliminary   04/01/2019 No growth at 3 days  Preliminary   04/01/2019 No growth at 3 days  Preliminary     Urine Culture   Date Value Ref Range Status   04/01/2019 No growth at 2 days  Final     Respiratory Culture   Date Value Ref Range Status   04/01/2019 Heavy growth (4+) Normal Respiratory Danica  Final   04/01/2019 Moderate growth (3+) Staphylococcus aureus, MRSA (A)  Final     Comment:       Methicillin resistant Staphylococcus aureus, Patient may be an isolation risk.       Radiology Data:   Imaging Results (last 24 hours)     Procedure Component Value Units Date/Time    FL Video Swallow With Speech [[id number]] Collected:  04/05/19 1527     Updated:  04/05/19 1537    Narrative:       EXAMINATION: FL VIDEO SWALLOW W SPEECH-     4/5/2019 3:26 PM CDT     HISTORY: dysphagia; A41.9-Sepsis, unspecified organism; R65.21-Severe  sepsis with septic shock; I46.9-Cardiac arrest, cause  unspecified;  R13.12-Dysphagia, oropharyngeal phase; R68.89-Other general symptoms and  signs.     Routine Dysphagia exam under fluoroscopy with the patient seated.   A speech therapist conducted the exam.   The exam was recorded on DVD.     A single fluoroscopic image was captured.  No radiographs were exposed.  Fluoroscopy time = 1.6 minutes.     Honey consistency:  No epiglottic inversion. Prominent vallecular residual.     Repeat honey with chin type:  Improved handling of the bolus though still a large amount of residue  within the vallecula.     Nectar consistency:  Chin tuck positioning. Anterior wall coating of the upper trachea noted  before this swallow as a result of the prominent vallecular residual.  Laryngeal penetration with aspiration of nectar consistency.     Pudding consistency:  Chin tuck positioning. Good handling of the bolus. Moderate persistent  residue within the vallecula.     Repeat pudding showed the same results with good handling of the bolus.  Prominent persistent residue.      Repeat honey with chin tuck:  Moderate persistent residue. Adequate handling of the bolus.           This report was finalized on 04/05/2019 15:30 by Dr. Richard Santos MD.          I have reviewed the patient's current medications.     Assessment/Plan     Active Hospital Problems    Diagnosis   • Sepsis (CMS/formerly [facility])     1.  Septic Shock  2.  PEA Cardiac Arrest s/p ROSC  3.  New onset atrial fibrillation with RVR  4.  Metabolic Acidosis due to Lactic Acidosis - improved  5.  Abnormal LFTs/transaminitis - likely exac by #1; improved  6.  Acute Kidney Injury due to #1; improved  7.  Hypokalemia  8.  Pneumonia - Staphylococcus on respiratory culture (MRSA)  9.  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  Acute respiratory failure due to #1 - extubated 4/4/2019  11.  Bacteremia due to beta hemolytic Streptococcus - noted from OSH prior to transfer  12.  Diabetes mellitus - complicated by  hyperglycemia with hemoglobin A1c 9.1; also with peripheral neuropathy  13.  Dysphagia    Plan:  1.  Continue IV Vanco - day 5 of tx  2.  Surveillance blood cultures from 4/1 and 4/3 negative  3.  PO Amio; PO metoprolol  4.  ST recommendations reviewed; will attempt trial of pureed diet with honey thick liquids; he will need assistance with ALL feeding.  If he has any s/sxs of aspiration will need to discuss TAWANDA including Dobhoff tube placement  5.  Wound care  6.  D/C Albuterol; continue Atrovent  7.  Now back to room air with no supplemental 02 requirement  8.  PO Eliquis for anticoagulation given [id number] score  9.  Restart Lisinopril today  10.  SSI coverage; add PO Metformin; A1c is 9.1  11.  Patient currently on 3LNC; wean as tolerated  12.  Condom catheter in place; hope to d/c fecal management system soon  13.  PT assessment  14.  Dispo planning: patient was at [facility] prior to this hospitalization; continue dispo planning    Alonzo Milligan MD   04/06/19   11:00 AM

## 2019-04-06 NOTE — PLAN OF CARE
Problem: Patient Care Overview  Goal: Plan of Care Review  Outcome: Ongoing (interventions implemented as appropriate)   04/06/19 1104   Coping/Psychosocial   Plan of Care Reviewed With other (see comments)  (nsg)   Plan of Care Review   Progress no change   OTHER   Outcome Summary Pt seen for reassessment following MBS yesterday. Based on Dysphagia study, patient at risk for silent/over aspiration. During assessment today, significant oral residue noted after each swallow. Patient had difficulty clearing oral cavity and lips. Following oral care, patient cleared residue with sips X3 of H2O. Patient presented pureed diet and with prompt used a chin tuck to swallow. Unfortunately at bedside, ST unable to determine silent aspiration, but according to yesterday's MBS, patient indeed is at risk. Recommendations: Continue NPO, with ice chips and H2O following oral care. IF MD chooses to order a diet, the safest PO diet would be pureed with honey thick liquids, otherwise NPO with ice chips & h2o following oral care and medications crushed in applesauce. ST to recheck.

## 2019-04-06 NOTE — PROGRESS NOTES
Malnutrition Severity Assessment    Patient Name:  Vitaly Pantoja  YOB: 1960  MRN: [id number]  Admit Date:  4/1/2019    Patient meets criteria for : Moderate malnutrition    Comments:  If in agreement with malnutrition assessment, please attest documentation. Thanks.     Malnutrition Type: Chronic Illness Malnutrition     Malnutrition Type (last 8 hours)      Malnutrition Severity Assessment     Row Name 04/06/19 1544       Malnutrition Severity Assessment    Malnutrition Type  Chronic Illness Malnutrition    Row Name 04/06/19 1544       Physical Signs of Malnutrition (Chronic)    Muscle Wasting  Mild moderate muscle wasting: sunken temporalis, clavicle bone visible and squared shoulders, some depression of interosseous muscle    Fat Loss  Mild moderate fat wasting: sunken/dark orbital fat pad, mild depression of buccal fat pad, loose tissue to upper arm    Fluid Accumulation  Mild    Secondary Physical Signs  Present (comment) dry/flaky skin with decreased turgor; pressure injuries to L and R foot.     Row Name 04/06/19 1544       Energy Intake Status (Chronic)    Energy Intake  Mild (<75% / 5d)    Row Name 04/06/19 1544       Criteria Met (Must meet criteria for severity in at least 2 of these categories: M Wasting, Fat Loss, Fluid, Secondary Signs, Wt. Status, Intake)    Patient meets criteria for   Moderate malnutrition          Electronically signed by:  Francine Madsen RDN, LD  04/06/19 3:57 PM

## 2019-04-06 NOTE — THERAPY EVALUATION
Acute Care - Speech Language Pathology   Swallow Initial Evaluation [facility]     Patient Name: Vitaly Pantoja  : 1960  MRN: [id number]  Today's Date: 2019  Onset of Illness/Injury or Date of Surgery: 19     Referring Physician: Dr. Milligan      Admit Date: 2019    Visit Dx:     ICD-10-CM ICD-9-CM   1. Shock, septic (CMS/HCC) A41.9 038.9    R65.21 785.52     995.92   2. Cardiac arrest (CMS/HCC) I46.9 427.5   3. Oropharyngeal dysphagia R13.12 787.22   4. Decreased activities of daily living (ADL) R68.89 780.99   5. Impaired mobility Z74.09 799.89     Patient Active Problem List   Diagnosis   • Sepsis (CMS/HCC)     Past Medical History:   Diagnosis Date   • Back pain     current back pain per pt   • Chronic rheumatic arthritis (CMS/HCC)    • Diabetes mellitus (CMS/[facility])    • Hip dysplasia, congenital      Past Surgical History:   Procedure Laterality Date   • APPENDECTOMY     • JOINT REPLACEMENT Bilateral     TKA   • TONSILLECTOMY          Pt seen at bedside for reassessment of swallowing following MBS yesterday. Recommendations were to reassess today using honey thick liquids and pureed diet.   Based on Dysphagia study yesterday, patient at risk for silent/over aspiration. Patient presented pureed diet and with prompt used a chin tuck to swallow. At bedside, ST unable to determine silent aspiration, but according to yesterday's MBS, patient is at risk for aspiration as silent aspiration was noted during the MBS. During assessment today, significant oral residue noted after each swallow. Patient had difficulty clearing oral cavity and lips of residue. Following oral care, patient cleared residue with sips X3  of H2O.  Recommendations: Continue NPO, with ice chips and H2O following oral care. IF MD chooses to order a diet, the safest PO diet would be pureed with honey thick liquids, otherwise NPO with ice chips & h2o following oral care and medications crushed in applesauce. ST to recheck on  Monday.      See flowsheets below for additional information.        SWALLOW EVALUATION (last 72 hours)      SLP Adult Swallow Evaluation     Row Name 04/06/19 0930 04/05/19 1504 04/04/19 1340             Rehab Evaluation    Document Type  evaluation  -PH  evaluation  -MB  evaluation  -MB (r) EH (t) MB (c)      Subjective Information  --  complains of thirst  -MB  no complaints  -MB (r) EH (t) MB (c)      Patient Observations  --  alert;cooperative  -MB  alert;cooperative  -MB (r) EH (t) MB (c)      Patient/Family Observations  --  No family present  -MB  No family present  -MB (r) EH (t) MB (c)      Patient Effort  --  --  good  -MB (r) EH (t) MB (c)      Symptoms Noted During/After Treatment  --  --  none  -MB (r) EH (t) MB (c)         General Information    Patient Profile Reviewed  yes  -PH  yes  -MB  yes  -MB (r) EH (t) MB (c)      Pertinent History Of Current Problem  --  History of hypotension, pneumonia, a fib, septic shock, diabetes, previous tonsillectomy, cardiac arrest, CPR, intubated on 4-1-19, extubated on 4-4-19 -MB  History of hypotension, pneumonia, a fib, septic shock, diabetes, previous tonsillectomy, cardiac arrest, CPR, intubated on 4-1-19, extubated on 4-4-19  -MB      Current Method of Nutrition  NPO  -PH  NPO except meds with applesauce and sips/chips  -MB  NPO  -MB (r) EH (t) MB (c)      Precautions/Limitations, Vision  WFL  -PH  WFL  -MB  WFL  -MB (r) EH (t) MB (c)      Precautions/Limitations, Hearing  WFL  -PH  WFL  -MB  WFL  -MB (r) EH (t) MB (c)      Prior Level of Function-Communication  cognitive-linguistic impairment  -PH  cognitive-linguistic impairment;other (see comments) intellectual disability  -MB  cognitive-linguistic impairment;other (see comments) Intellectual disability  -MB      Prior Level of Function-Swallowing  other (see comments) HX of dysphagia according to chart.   -PH  --  NPO  -MB (r) EH (t) MB (c)      Plans/Goals Discussed with  patient  -PH  patient  -MB   patient  -MB (r) EH (t) MB (c)      Barriers to Rehab  cognitive status  -PH  cognitive status  -MB  previous functional deficit  -MB (r) EH (t) MB (c)      Patient's Goals for Discharge  -- patient wanting water.   -PH  return to PO diet  -MB  patient did not state  -MB (r) EH (t) MB (c)      Family Goals for Discharge  other (see comments) not available  -PH  --  other (see comments) Family not present   -MB (r) EH (t) MB (c)         Pain Assessment    Additional Documentation  --  --  Pain Scale: FACES Pre/Post-Treatment (Group)  -MB (r) EH (t) MB (c)         Pain Scale: FACES Pre/Post-Treatment    Pain: FACES Scale, Pretreatment  --  0-->no hurt  -MB  0-->no hurt  -MB (r) EH (t) MB (c)      Pain: FACES Scale, Post-Treatment  --  --  0-->no hurt  -MB (r) EH (t) MB (c)         Oral Motor and Function    Dentition Assessment  --  poor oral hygiene;missing teeth;teeth are in poor condition  -MB  poor oral hygiene;missing teeth;teeth are in poor condition  -MB (r) EH (t) MB (c)      Secretion Management  --  WNL/WFL  -MB  WNL/WFL  -MB (r) EH (t) MB (c)      Mucosal Quality  --  moist, healthy  -MB  cracked;dry  -MB (r) EH (t) MB (c)      Volitional Swallow  --  --  weak  -MB (r) EH (t) MB (c)      Volitional Cough  --  --  weak;non-productive  -MB (r) EH (t) MB (c)         Oral Musculature and Cranial Nerve Assessment    Oral Motor General Assessment  --  --  generalized oral motor weakness  -MB (r) EH (t) MB (c)      Mandibular Impairment Detail, Cranial Nerve V (Trigeminal)  --  --  reduced strength bilaterally  -MB (r) EH (t) MB (c)         General Eating/Swallowing Observations    Respiratory Support Currently in Use  --  --  nasal cannula  -MB (r) EH (t) MB (c)      Eating/Swallowing Skills  --  --  fed by SLP;unaware of safety concerns  -MB (r) EH (t) MB (c)      Positioning During Eating  --  --  upright in bed  -MB (r) EH (t) MB (c)      Utensils Used  --  --  spoon;straw  -MB (r) EH (t) MB (c)       Consistencies Trialed  --  --  regular textures;pureed;thin liquids;nectar/syrup-thick liquids;honey-thick liquids  -MB (r) EH (t) MB (c)         Clinical Swallow Eval    Oral Prep Phase  --  --  impaired  -MB (r) EH (t) MB (c)      Oral Transit  --  --  WFL  -MB (r) EH (t) MB (c)      Oral Residue  --  --  WFL  -MB (r) EH (t) MB (c)      Pharyngeal Phase  --  --  no overt signs/symptoms of pharyngeal impairment  -MB (r) EH (t) MB (c)      Esophageal Phase  --  --  unremarkable  -MB (r) EH (t) MB (c)         Oral Prep Concerns    Oral Prep Concerns  --  --  bolus removed from mouth manually  -MB (r) EH (t) MB (c)      Bolus Removed from Mouth Manually  --  --  regular consistencies  -MB (r) EH (t) MB (c)      Oral Prep Concerns, Comment  --  --  Difficulty with mastication  -MB (r) EH (t) MB (c)         MBS/VFSS    Utensils Used  --  spoon;straw  -MB  --      Consistencies Trialed  --  nectar/syrup-thick liquids;honey-thick liquids;pudding thick  -MB  --         MBS/VFSS Interpretation    Oral Prep Phase  --  impaired oral phase of swallowing  -MB  --      Oral Transit Phase  --  impaired  -MB  --      Oral Residue  --  impaired  -MB  --      VFSS Summary  --  Trials were presented in the following order: honey x2, nectar, pudding x2, honey. Pt exhibited poor hyolaryngeal elevation/excursion, epiglottic inversion, and pharyngeal constriction. With the first honey thick trial the pt passed only a minimal amount of the bolus into the vallecula with profound residue remaining in the pharyngeal area. He cleared some residue when using a chin tuck, but had laryngeal penetration of residue and still had severe residue remaining. With the rest of the trials the pt was instructed to use a chin tuck strategy. He exhibited silent laryngeal penetration during the initial swallow of nectar thick then subsequent silent aspiration during a multiple swallow. He was cued to cough and swallow again, but was still observed to have  residue in the laryngeal vestibule and trachea. No definite laryngeal penetration or aspiration was observed with pudding thick, but the pt still had severe pharyngeal residue putting him at an increased risk for aspiration after the swallow. Again, using a chin tuck did seem to help clear more residue, but what was remaining was still of a severe amount.   -MB  --         Oral Preparatory Phase    Oral Preparatory Phase  --  inadequate manipulation  -MB  --      Inadequate Manipulation  --  all consistencies tested  -MB  --         Oral Transit Phase    Impaired Oral Transit Phase  --  increased A-P transit time  -MB  --      Increased A-P Transit Time  --  all consistencies tested  -MB  --         Oral Residue    Impaired Oral Residue  --  diffuse residue throughout oral cavity  -MB  --      Diffuse Residue throughout Oral Cavity  --  all consistencies tested  -MB  --         Initiation of Pharyngeal Swallow    Pharyngeal Phase  --  impaired pharyngeal phase of swallowing  -MB  --      Penetration During the Swallow  --  nectar-thick liquids  -MB  --      Aspiration During the Swallow  --  nectar-thick liquids  -MB  --      Penetration After the Swallow  --  honey-thick liquids  -MB  --      Response to Penetration  --  no response  -MB  --      Response to Aspiration  --  no response, silent aspiration  -MB  --      Pharyngeal Residue  --  all consistencies tested;diffuse within pharynx  -MB  --      Response to Residue  --  unable to clear residue  -MB  --      Attempted Compensatory Maneuvers  --  chin tuck  -MB  --      Response to Attempted Compensatory Maneuvers  --  reduced residue;other (see comments) still severe residue remaining  -MB  --         Clinical Impression    SLP Swallowing Diagnosis  --  severe;pharyngeal dysfunction  -MB  --      Functional Impact  --  risk of aspiration/pneumonia;risk of malnutrition;risk of dehydration  -MB  --      Rehab Potential/Prognosis, Swallowing  --  adequate,  monitor progress closely  -MB  --      Swallow Criteria for Skilled Therapeutic Interventions Met  --  demonstrates skilled criteria  -MB  --         Recommendations    Therapy Frequency (Swallow)  at least;3 days per week  -PH  3 days per week  -MB  3 days per week  -MB (r) PATSY (t) MB (c)      Predicted Duration Therapy Intervention (Days)  --  until discharge  -MB  until discharge  -MB (r) PATSY (t) MB (c)      SLP Diet Recommendation  --  NPO;ice chips between meals after oral care, with supervision;other (see comments) trial tray of pureed/honey tomorrow  -MB  NPO;ice chips between meals after oral care, with supervision;water between meals after oral care, with supervision  -MB (r) PATSY (t) MB (c)      Recommended Diagnostics  --  --  VFSS (MBS)  -MB (r) PATSY (t) MB (c)      Recommended Precautions and Strategies  --  upright posture during/after eating  -MB  --      SLP Rec. for Method of Medication Administration  --  meds crushed;with pudding or applesauce using a chin tuck strategy  -MB  with pudding or applesauce;meds whole;meds crushed  -MB      Monitor for Signs of Aspiration  --  yes;cough;gurgly voice;throat clearing  -MB  yes;notify SLP if any concerns;cough;throat clearing;gurgly voice;fever  -MB (r) PATSY (t) MB (c)      Anticipated Dischage Disposition  --  unknown  -MB  unknown  -MB (r) PATSY (t) MB (c)         Swallow Goals (SLP)    Oral Nutrition/Hydration Goal Selection (SLP)  --  oral nutrition/hydration, SLP goal 1  -MB  oral nutrition/hydration, SLP goal 1  -MB (r) PATSY (t) MB (c)      Pharyngeal Strengthening Exercise Goal Selection (SLP)  --  pharyngeal strengthening exercise, SLP goal 1  -MB  --      Additional Documentation  --  pharyngeal strengthening exercise goal selection (SLP)  -MB  --         Oral Nutrition/Hydration Goal 1 (SLP)    Oral Nutrition/Hydration Goal 1, SLP  --  Pt will tolerate least restrictive diet with no overt s/s of aspiration.  -MB  Pt will tolerate least restrictive diet with  no overt s/s of aspiration.  -MB (r) EH (t) MB (c)      Time Frame (Oral Nutrition/Hydration Goal 1, SLP)  --  by discharge  -MB  by discharge  -MB (r) EH (t) MB (c)      Barriers (Oral Nutrition/Hydration Goal 1, SLP)  --  none  -MB  cognitive impairment  -MB (r) EH (t) MB (c)      Progress/Outcomes (Oral Nutrition/Hydration Goal 1, SLP)  --  goal ongoing  -MB  goal ongoing  -MB (r) EH (t) MB (c)         Pharyngeal Strengthening Exercise Goal 1 (SLP)    Activity (Pharyngeal Strengthening Goal 1, SLP)  --  increase superior movement of the hyolaryngeal complex;increase anterior movement of the hyolaryngeal complex;increase squeeze/positive pressure generation  -MB  --      Increase Superior Movement of the Hyolaryngeal Complex  --  Mendelsohn NMES  -MB  --      Increase Anterior Movement of the Hyolaryngeal Complex  --  shaker  -MB  --      Increase Squeeze/Positive Pressure Generation  --  hard effortful swallow;marsha  -MB  --      [place]/Accuracy (Pharyngeal Strengthening Goal 1, SLP)  --  with minimal cues (75-90% accuracy)  -MB  --      Time Frame (Pharyngeal Strengthening Goal 1, SLP)  --  short term goal (STG);by discharge  -MB  --      Barriers (Pharyngeal Strengthening Goal 1, SLP)  --  n/a  -MB  --      Progress/Outcomes (Pharyngeal Strengthening Goal 1, SLP)  --  goal ongoing  -MB  --        User Key  (r) = Recorded By, (t) = Taken By, (c) = Cosigned By    Initials Name Effective Dates    Julia Saenz, CCC-SLP 08/02/16 -     Araseli Del [place], CCC-SLP 08/02/16 -     Daphne Mena, Speech Therapy Student 02/27/19 -           EDUCATION  The patient has been educated in the following areas:   Dysphagia (Swallowing Impairment).    SLP Recommendation and Plan  SLP Swallowing Diagnosis: severe, oral dysfunction, pharyngeal dysfunction  SLP Diet Recommendation: water between meals after oral care, with supervision, ice chips between meals after oral care, with supervision, NPO(If not  concerned with aspiration, safest PO pureed/HT)  Recommended Precautions and Strategies: upright posture during/after eating, check mouth frequently for oral residue/pocketing, chin Tuck, volitional throat clear(IF DOCTOR ORDERS DIET)     Monitor for Signs of Aspiration: yes     Swallow Criteria for Skilled Therapeutic Interventions Met: demonstrates skilled criteria  Anticipated Dischage Disposition: skilled nursing facility  Rehab Potential/Prognosis, Swallowing: re-evaluate goals as necessary  Therapy Frequency (Swallow): at least, 3 days per week  Predicted Duration Therapy Intervention (Days): until discharge       Plan of Care Reviewed With: other (see comments)(nsg)  Plan of Care Review  Plan of Care Reviewed With: other (see comments)(nsg)  Daily Summary of Progress (SLP): unable to show any progress toward functional goals  Progress: no change  Outcome Summary: Pt seen for reassessment following MBS yesterday. Based on Dysphagia study, patient at risk for silent/over aspiration. During assessment today, significant oral residue noted after each swallow. Patient had difficulty clearing oral cavity and lips. Following oral care, patient cleared residue with sips X3  of H2O. Patient presented pureed diet and with prompt used a chin tuck to swallow. Unfortunately at bedside, ST unable to determine silent aspiration, but according to yesterday's MBS, patient indeed is at risk. Recommendations: Continue NPO, with ice chips and H2O following oral care. IF MD chooses to order a diet, the safest PO diet would be pureed with honey thick liquids, otherwise NPO with ice chips & h2o following oral care and medications crushed in applesauce. ST to recheck.      SLP GOALS     Row Name 04/05/19 1504 04/05/19 0952 04/04/19 1340       Oral Nutrition/Hydration Goal 1 (SLP)    Oral Nutrition/Hydration Goal 1, SLP  Pt will tolerate least restrictive diet with no overt s/s of aspiration.  -MB  Pt will tolerate least restrictive  diet with no overt s/s of aspiration.  -MB  Pt will tolerate least restrictive diet with no overt s/s of aspiration.  -MB (r) EH (t) MB (c)    Time Frame (Oral Nutrition/Hydration Goal 1, SLP)  by discharge  -MB  by discharge  -MB  by discharge  -MB (r) EH (t) MB (c)    Barriers (Oral Nutrition/Hydration Goal 1, SLP)  none  -MB  none  -MB  cognitive impairment  -MB (r) EH (t) MB (c)    Progress/Outcomes (Oral Nutrition/Hydration Goal 1, SLP)  goal ongoing  -MB  continuing progress toward goal  -MB  goal ongoing  -MB (r) EH (t) MB (c)       Pharyngeal Strengthening Exercise Goal 1 (SLP)    Activity (Pharyngeal Strengthening Goal 1, SLP)  increase superior movement of the hyolaryngeal complex;increase anterior movement of the hyolaryngeal complex;increase squeeze/positive pressure generation  -MB  --  --    Increase Superior Movement of the Hyolaryngeal Complex  Mendelsohn NMES  -MB  --  --    Increase Anterior Movement of the Hyolaryngeal Complex  shaker  -MB  --  --    Increase Squeeze/Positive Pressure Generation  hard effortful swallow;marsha  -MB  --  --    [place]/Accuracy (Pharyngeal Strengthening Goal 1, SLP)  with minimal cues (75-90% accuracy)  -MB  --  --    Time Frame (Pharyngeal Strengthening Goal 1, SLP)  short term goal (STG);by discharge  -MB  --  --    Barriers (Pharyngeal Strengthening Goal 1, SLP)  n/a  -MB  --  --    Progress/Outcomes (Pharyngeal Strengthening Goal 1, SLP)  goal ongoing  -MB  --  --      User Key  (r) = Recorded By, (t) = Taken By, (c) = Cosigned By    Initials Name Provider Type    Julia Saenz, CCC-SLP Speech and Language Pathologist    Daphne Mena, Speech Therapy Student Speech Therapy Student           SLP Outcome Measures (last 72 hours)      SLP Outcome Measures     Row Name 04/06/19 1100             SLP Outcome Measures    Outcome Measure Used?  Adult NOMS  -PH         Adult FCM Scores    FCM Chosen  Swallowing  -PH      Swallowing FCM Score  7  -PH         User Key  (r) = Recorded By, (t) = Taken By, (c) = Cosigned By    Initials Name Effective Dates     Araseli Arita CCC-SLP 08/02/16 -            Time Calculation:   Time Calculation- SLP     Row Name 04/06/19 1124             Time Calculation- SLP    SLP Start Time  0930  -PH      SLP Stop Time  1124  -PH      SLP Time Calculation (min)  114 min  -PH      SLP Goal Re-Cert Due Date  04/15/19  -PH        User Key  (r) = Recorded By, (t) = Taken By, (c) = Cosigned By    Initials Name Provider Type     Araseli Arita CCC-SLP Speech and Language Pathologist          Therapy Charges for Today     Code Description Service Date Service Provider Modifiers Qty    [id number]  ST EVAL ORAL PHARYNG SWALLOW 6 4/6/2019 Araseli Arita CCC-SLP GN, KX 1    [id number] HC ST CARE PLAN 15 MIN 4/6/2019 Araseli Arita CCC-SLP GN 2               Araseli J. [place], CCC-SLP  4/6/2019

## 2019-04-06 NOTE — PROGRESS NOTES
"Pharmacy Dosing Service  Pharmacokinetics  Vancomycin Follow-up Evaluation    Assessment/Action/Plan:  Follow up Vancomycin trough ordered before dose due on 4/7 at 0900.  Continue Vancomycin 1000mg iv q12h. Pharmacy will continue to monitor renal function and adjust dose accordingly.     Subjective:  Vitaly Pantoja is a 59 y.o. male currently on Vancomycin 1000 mg IV every 12 hours for the treatment of skin and soft tissue infection, day 6 of therapy (Current Vancomycin end date: 4/12 scheduled at 0900).    Objective:  Ht: 165.1 cm (65\"); Wt: 78.1 kg (172 lb 2.9 oz)  Estimated Creatinine Clearance: 136.8 mL/min (by C-G formula based on SCr of 0.56 mg/dL).   Lab Results   Component Value Date    CREATININE 0.56 04/06/2019    CREATININE 0.67 04/05/2019    CREATININE 0.56 04/05/2019      Lab Results   Component Value Date    WBC 12.20 (H) 04/06/2019    WBC 8.43 04/05/2019    WBC 21.41 (H) 04/03/2019         Lab Results   Component Value Date    VANCORANDOM 8.05 04/04/2019       Culture Results:  Microbiology Results (last 10 days)       Procedure Component Value - Date/Time    Blood Culture With FAN - Blood, Arm, Right [[id number]] Collected:  04/03/19 1513    Lab Status:  Preliminary result Specimen:  Blood from Arm, Right Updated:  04/05/19 1602     Blood Culture No growth at 2 days    Influenza Antigen, Rapid - Swab, Nasopharynx [[id number]]  (Normal) Collected:  04/01/19 2258    Lab Status:  Final result Specimen:  Swab from Nasopharynx Updated:  04/01/19 2318     Influenza A Ag, EIA Negative     Influenza B Ag, EIA Negative    Narrative:       Recommend confirmation of negative results by viral culture or molecular assay.    S. Pneumo Ag Urine or CSF - Urine, Urine, Clean Catch [[id number]]  (Normal) Collected:  04/01/19 2055    Lab Status:  Final result Specimen:  Urine, Clean Catch Updated:  04/01/19 2117     Strep Pneumo Ag Negative    Legionella Antigen, Urine - Urine, Urine, Clean Catch [[id number]]  (Normal) " Collected:  04/01/19 2055    Lab Status:  Final result Specimen:  Urine, Clean Catch Updated:  04/01/19 2117     LEGIONELLA ANTIGEN, URINE Negative    Urine Culture - Urine, Urine, Catheter [[id number]]  (Normal) Collected:  04/01/19 1933    Lab Status:  Final result Specimen:  Urine, Catheter Updated:  04/03/19 0942     Urine Culture No growth at 2 days    Respiratory Culture - Sputum, Cough [[id number]]  (Abnormal)  (Susceptibility) Collected:  04/01/19 1745    Lab Status:  Final result Specimen:  Sputum from Cough Updated:  04/05/19 0658     Respiratory Culture Heavy growth (4+) Normal Respiratory Twan      Moderate growth (3+) Staphylococcus aureus, MRSA     Comment:   Methicillin resistant Staphylococcus aureus, Patient may be an isolation risk.        Gram Stain Greater than 25 WBCs per low power field      Many (4+) Mixed gram positive twan      Few (2+) Epithelial cells seen    Susceptibility        Staphylococcus aureus, MRSA     ERIK     Clindamycin Susceptible     Erythromycin Susceptible     Gentamicin Susceptible     Inducible Clindamycin Resistance Negative     Levofloxacin Susceptible [1]      Oxacillin Resistant     Penicillin G Resistant     Tetracycline Susceptible     Trimethoprim + Sulfamethoxazole Susceptible     Vancomycin Susceptible              [1]   Staphylococcus species may develop resistance during prolonged therapy with quinolones. Isolates that are initially susceptible may become resistant within three to four days after initiation of therapy. Testing of repeat isolates may be warranted.                Susceptibility Comments       Staphylococcus aureus, MRSA    This isolate does not demonstrate inducible clindamycin resistance in vitro.                 Blood Culture - Blood, Hand, Right [[id number]] Collected:  04/01/19 1625    Lab Status:  Preliminary result Specimen:  Blood from Hand, Right Updated:  04/05/19 1700     Blood Culture No growth at 4 days    Blood Culture - Blood, Hand,  Left [[id number]] Collected:  04/01/19 1625    Lab Status:  Preliminary result Specimen:  Blood from Hand, Left Updated:  04/05/19 1700     Blood Culture No growth at 4 days            Cem Barton, [facility]   04/06/19 3:54 PM

## 2019-04-06 NOTE — PLAN OF CARE
Problem: Patient Care Overview  Goal: Plan of Care Review  Outcome: Ongoing (interventions implemented as appropriate)   04/06/19 5726   Coping/Psychosocial   Plan of Care Reviewed With patient   Plan of Care Review   Progress no change   OTHER   Outcome Summary BPs elevated; other vss; alert and oriented to self and place; no c/o pain; iv abx continue; fecal tube; condom catheter; npo; meds crushed with applesauce; bilat foot dressings changed; continue to monitor     Goal: Individualization and Mutuality  Outcome: Ongoing (interventions implemented as appropriate)    Goal: Discharge Needs Assessment  Outcome: Ongoing (interventions implemented as appropriate)    Goal: Interprofessional Rounds/Family Conf  Outcome: Ongoing (interventions implemented as appropriate)      Problem: Skin Injury Risk (Adult)  Goal: Skin Health and Integrity  Outcome: Ongoing (interventions implemented as appropriate)      Problem: Fall Risk (Adult)  Goal: Absence of Fall  Outcome: Ongoing (interventions implemented as appropriate)      Problem: Sepsis/Septic Shock (Adult)  Goal: Signs and Symptoms of Listed Potential Problems Will be Absent, Minimized or Managed (Sepsis/Septic Shock)  Outcome: Ongoing (interventions implemented as appropriate)      Problem: Nutrition, Imbalanced: Inadequate Oral Intake (Adult)  Goal: Improved Oral Intake  Outcome: Ongoing (interventions implemented as appropriate)    Goal: Prevent Further Weight Loss  Outcome: Ongoing (interventions implemented as appropriate)      Problem: Dysphagia (Adult)  Goal: Functional/Safe Swallow  Outcome: Ongoing (interventions implemented as appropriate)    Goal: Compensatory Techniques to Improve Safety/Function with Swallowing  Outcome: Ongoing (interventions implemented as appropriate)

## 2019-04-06 NOTE — PLAN OF CARE
Problem: Patient Care Overview  Goal: Plan of Care Review  Outcome: Ongoing (interventions implemented as appropriate)   04/06/19 1556   Coping/Psychosocial   Plan of Care Reviewed With patient   Plan of Care Review   Progress improving   OTHER   Outcome Summary Pt started on Pureed diet with HT liquids and CCHO restriction today. He did tell me he ate well at lunch (recorded intake 25%). Encouraged PO intake at dinner and added boost pudding to help meet needs. Pt does meet criteria for malnutrition; see progress notes for details. will continue to follow.       Problem: Nutrition, Imbalanced: Inadequate Oral Intake (Adult)  Goal: Improved Oral Intake  Outcome: Ongoing (interventions implemented as appropriate)   04/06/19 1556   Nutrition, Imbalanced: Inadequate Oral Intake (Adult)   Improved Oral Intake making progress toward outcome     Goal: Prevent Further Weight Loss  Outcome: Ongoing (interventions implemented as appropriate)   04/06/19 1556   Nutrition, Imbalanced: Inadequate Oral Intake (Adult)   Prevent Further Weight Loss making progress toward outcome

## 2019-04-07 NOTE — PROGRESS NOTES
"Pharmacy Dosing Service  Pharmacokinetics  Vancomycin Follow-up Evaluation    Assessment/Action/Plan:  4/7 0726 Vancomycin trough = 7.41 (10.5 hours post 1000mg dose).  Vancomcyin dose adjusted to 1000mg iv q8h.  Pharmacy will continue to monitor renal function and adjust dose accordingly.     Subjective:  Vitaly Pantoja is a 59 y.o. male currently on Vancomycin for the treatment of skin and soft tissue infection, day 7 of therapy (Current vancomycin end date: 4/12 scheduled at 0900).    Objective:  Ht: 165.1 cm (65\"); Wt: 78.6 kg (173 lb 4 oz)  Estimated Creatinine Clearance: 118.2 mL/min (by C-G formula based on SCr of 0.65 mg/dL).   Lab Results   Component Value Date    CREATININE 0.65 04/07/2019    CREATININE 0.56 04/06/2019    CREATININE 0.67 04/05/2019      Lab Results   Component Value Date    WBC 14.33 (H) 04/07/2019    WBC 12.20 (H) 04/06/2019    WBC 8.43 04/05/2019         Lab Results   Component Value Date    VANCOTROUGH 7.41 (L) 04/07/2019    VANCORANDOM 8.05 04/04/2019       Culture Results:  Microbiology Results (last 10 days)       Procedure Component Value - Date/Time    Blood Culture With FAN - Blood, Arm, Right [[id number]] Collected:  04/03/19 1513    Lab Status:  Preliminary result Specimen:  Blood from Arm, Right Updated:  04/06/19 1600     Blood Culture No growth at 3 days    Influenza Antigen, Rapid - Swab, Nasopharynx [[id number]]  (Normal) Collected:  04/01/19 2258    Lab Status:  Final result Specimen:  Swab from Nasopharynx Updated:  04/01/19 2318     Influenza A Ag, EIA Negative     Influenza B Ag, EIA Negative    Narrative:       Recommend confirmation of negative results by viral culture or molecular assay.    S. Pneumo Ag Urine or CSF - Urine, Urine, Clean Catch [[id number]]  (Normal) Collected:  04/01/19 2055    Lab Status:  Final result Specimen:  Urine, Clean Catch Updated:  04/01/19 2117     Strep Pneumo Ag Negative    Legionella Antigen, Urine - Urine, Urine, Clean Catch " [[id number]]  (Normal) Collected:  04/01/19 2055    Lab Status:  Final result Specimen:  Urine, Clean Catch Updated:  04/01/19 2117     LEGIONELLA ANTIGEN, URINE Negative    Urine Culture - Urine, Urine, Catheter [[id number]]  (Normal) Collected:  04/01/19 1933    Lab Status:  Final result Specimen:  Urine, Catheter Updated:  04/03/19 0942     Urine Culture No growth at 2 days    Respiratory Culture - Sputum, Cough [[id number]]  (Abnormal)  (Susceptibility) Collected:  04/01/19 1745    Lab Status:  Final result Specimen:  Sputum from Cough Updated:  04/05/19 0658     Respiratory Culture Heavy growth (4+) Normal Respiratory Twan      Moderate growth (3+) Staphylococcus aureus, MRSA     Comment:   Methicillin resistant Staphylococcus aureus, Patient may be an isolation risk.        Gram Stain Greater than 25 WBCs per low power field      Many (4+) Mixed gram positive twan      Few (2+) Epithelial cells seen    Susceptibility        Staphylococcus aureus, MRSA     ERIK     Clindamycin Susceptible     Erythromycin Susceptible     Gentamicin Susceptible     Inducible Clindamycin Resistance Negative     Levofloxacin Susceptible [1]      Oxacillin Resistant     Penicillin G Resistant     Tetracycline Susceptible     Trimethoprim + Sulfamethoxazole Susceptible     Vancomycin Susceptible              [1]   Staphylococcus species may develop resistance during prolonged therapy with quinolones. Isolates that are initially susceptible may become resistant within three to four days after initiation of therapy. Testing of repeat isolates may be warranted.                Susceptibility Comments       Staphylococcus aureus, MRSA    This isolate does not demonstrate inducible clindamycin resistance in vitro.                 Blood Culture - Blood, Hand, Right [[id number]] Collected:  04/01/19 1625    Lab Status:  Final result Specimen:  Blood from Hand, Right Updated:  04/06/19 1700     Blood Culture No growth at 5 days    Blood Culture  - Blood, Hand, Left [[id number]] Collected:  04/01/19 1625    Lab Status:  Final result Specimen:  Blood from Hand, Left Updated:  04/06/19 1700     Blood Culture No growth at 5 days            Cem Barton [facility]   04/07/19 9:08 AM

## 2019-04-07 NOTE — PROGRESS NOTES
"    [facility] Medicine Services  INPATIENT PROGRESS NOTE    Patient Name: Vitaly Pantoja  Date of Admission: 4/1/2019  Today's Date: 04/07/19  Length of Stay: 6  Primary Care Physician: Emerson Sandhu MD    Subjective   Chief Complaint: Thirsty  HPI   Patient's 2 main requests are more water to drink, in addition to a readiness to go \"home\".  He denies any pain.  He denies feeling shortness of breath.  He does have some discomfort in his mouth from some oral lesions/ulcerations which are present.  He reports no abdominal pain.  Fecal management system was removed yesterday.  He did have a T-max of 100.8 last p.m.  He was given some Tylenol.  He has not had fever today.    Review of Systems     All pertinent negatives and positives are as above. All other systems have been reviewed and are negative unless otherwise stated.     Objective    Temp:  [98.3 °F (36.8 °C)-100.8 °F (38.2 °C)] 98.3 °F (36.8 °C)  Heart Rate:  [84-97] 84  Resp:  [17-22] 17  BP: (132-156)/(52-84) 152/76  Physical Exam   Constitutional: No distress.   HENT:   Head: Normocephalic.   Mouth/Throat: No oropharyngeal exudate (poor dentition; numerous lesions/ulcerations noted on tongue and oral cavity).   Eyes: Pupils are equal, round, and reactive to light. No scleral icterus.   Neck: No tracheal deviation present.   Cardiovascular:   Rate approx 80 this AM   Pulmonary/Chest: Effort normal. No respiratory distress.   Continues to be diminished at bases; on 2L NC at this time   Abdominal: Soft. He exhibits distension. There is no tenderness.   Good bowel sounds noted; fecal mgmt system now removed   Genitourinary:   Genitourinary Comments: Condom catheter    Musculoskeletal: He exhibits edema (pitting edema noted to lower extremities).   Neurological: He is alert.   Skin: Skin is warm. He is not diaphoretic.   Psychiatric: He has a normal mood and affect. His behavior is normal.   Vitals reviewed.    Results " Review:  I have reviewed the labs, radiology results, and diagnostic studies.    Laboratory Data:   Results from last 7 days   Lab Units 04/07/19  0254 04/06/19  0410 04/05/19  0740   WBC 10*3/mm3 14.33* 12.20* 8.43   HEMOGLOBIN g/dL 10.6* 11.1* 11.0*   HEMATOCRIT % 32.5* 34.6* 33.9*   PLATELETS 10*3/mm3 246 258 214        Results from last 7 days   Lab Units 04/07/19  0254 04/06/19  0410 04/05/19  1550 04/05/19  0740 04/03/19  0258 04/02/19  0236   SODIUM mmol/L 138 140 140 144 140 135   POTASSIUM mmol/L 3.4* 3.2* 3.1* 2.7* 3.5 5.6*   CHLORIDE mmol/L 96* 98 97* 101 101 101   CO2 mmol/L 36.0* 31.0 34.0* 34.0* 27.0 15.0*   BUN mg/dL 11 13 17 19 49* 60*   CREATININE mg/dL 0.65 0.56 0.67 0.56 1.30 2.45*   CALCIUM mg/dL 7.2* 7.4* 7.8* 7.7* 7.0* 7.0*   BILIRUBIN mg/dL  --   --   --  0.8 0.6 1.2*   ALK PHOS U/L  --   --   --  121* 168* 223*   ALT (SGPT) U/L  --   --   --  39 56* 67*   AST (SGOT) U/L  --   --   --  27 42 158*   GLUCOSE mg/dL 168* 203* 235* 177* 402* 213*       Culture Data:   Blood Culture   Date Value Ref Range Status   04/03/2019 No growth at 24 hours  Preliminary   04/01/2019 No growth at 3 days  Preliminary   04/01/2019 No growth at 3 days  Preliminary     Urine Culture   Date Value Ref Range Status   04/01/2019 No growth at 2 days  Final     Respiratory Culture   Date Value Ref Range Status   04/01/2019 Heavy growth (4+) Normal Respiratory Danica  Final   04/01/2019 Moderate growth (3+) Staphylococcus aureus, MRSA (A)  Final     Comment:       Methicillin resistant Staphylococcus aureus, Patient may be an isolation risk.       Radiology Data:   Imaging Results (last 24 hours)     ** No results found for the last 24 hours. **          I have reviewed the patient's current medications.     Assessment/Plan     Active Hospital Problems    Diagnosis   • Sepsis (CMS/HCC)     1.  Septic Shock  2.  PEA Cardiac Arrest s/p ROSC  3.  New onset atrial fibrillation with RVR  4.  Metabolic Acidosis due to Lactic  Acidosis - improved  5.  Abnormal LFTs/transaminitis - likely exac by #1; improved  6.  Acute Kidney Injury due to #1; improved  7.  Hypokalemia  8.  Pneumonia - Staphylococcus on respiratory culture (MRSA)  9.  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  Acute respiratory failure due to #1 - extubated 4/4/2019  11.  Bacteremia due to beta hemolytic Streptococcus - noted from OSH prior to transfer  12.  Diabetes mellitus - complicated by hyperglycemia with hemoglobin A1c 9.1; also with peripheral neuropathy  13.  Dysphagia  14.  Oral lesions/ulcerations    Plan:  1.  Continue IV Vanco - day 6 of tx  2.  Surveillance blood cultures from 4/1 and 4/3 negative  3.  Tmax 100.8 over past 24 hours; WBC trended up to 14.3 - will repeat CBC with diff and check procalcitonin AM (last PCT on 4/1 was 8)  4.  Repeat CXR today  5.  KUB (some abdominal distension noted on exam; no pain; fecal management system removed yesterday)  6.  Magic mouthwash and Mycelex cande for oral lesions  7.  PO Amio and Metoprolol; HR well controlled  8.  ST recommendations reviewed; currently appears to be doing well with trial of pureed diet with honey thick liquids; he will need assistance with ALL feeding. 5.  Wound care  9.  PO Eliquis for anticoagulation given [id number] scor  10.  Lisinopril restarted 4/6  11.  SSI coverage; PO Metformin added; A1c is 9.1  12.  Wean 02 as tolerated; currently on 2LNC  13.  Condom catheter in place  15.  Physical therapy; increase activity as tolerated; currently OOB to chair  16.  Dispo planning: patient was at [facility] prior to this hospitalization    Alonzo Milligan MD   04/07/19   11:39 AM

## 2019-04-07 NOTE — PLAN OF CARE
Problem: Patient Care Overview  Goal: Plan of Care Review  Outcome: Ongoing (interventions implemented as appropriate)   04/07/19 5185   Coping/Psychosocial   Plan of Care Reviewed With patient   Plan of Care Review   Progress no change   OTHER   Outcome Summary Patient had no new complaints today. Low grade temp this afternoon 100.9, tylenol given, MD notified, blood cultures and UA ordered. S 85-96. Up in chair part of the day. Patient not eating very much at mealtime, encouraged PO intake. Continue to monitor.        Problem: Skin Injury Risk (Adult)  Goal: Skin Health and Integrity  Outcome: Ongoing (interventions implemented as appropriate)      Problem: Fall Risk (Adult)  Goal: Absence of Fall  Outcome: Ongoing (interventions implemented as appropriate)      Problem: Sepsis/Septic Shock (Adult)  Goal: Signs and Symptoms of Listed Potential Problems Will be Absent, Minimized or Managed (Sepsis/Septic Shock)  Outcome: Ongoing (interventions implemented as appropriate)      Problem: Nutrition, Imbalanced: Inadequate Oral Intake (Adult)  Goal: Improved Oral Intake  Outcome: Ongoing (interventions implemented as appropriate)    Goal: Prevent Further Weight Loss  Outcome: Ongoing (interventions implemented as appropriate)

## 2019-04-07 NOTE — PLAN OF CARE
"Problem: Patient Care Overview  Goal: Plan of Care Review  Outcome: Ongoing (interventions implemented as appropriate)   04/07/19 1015   Coping/Psychosocial   Plan of Care Reviewed With patient   OTHER   Outcome Summary Pt. requires minimal assist for supine to sit. Performs LE ex's actively. Poor sitting balance,posterior \"falls\" in sitting. Performed some reaching, has very little trunk movement. Moderate assist to transfer to bedside chair.         "

## 2019-04-07 NOTE — PLAN OF CARE
Problem: Patient Care Overview  Goal: Plan of Care Review  Outcome: Ongoing (interventions implemented as appropriate)   04/07/19 0259   Coping/Psychosocial   Plan of Care Reviewed With patient   Plan of Care Review   Progress improving   OTHER   Outcome Summary VSS. PUREE DIET WITH HONEY THICK LIQUIDS. PT C/O GENERALIZED PAIN AND GIVEN PRN TYLENOL WITH RELIEF. 3L O2 NC. CONTINUOUS PULSE OX. FALL RISK PROTOCOL IN PLACE. BED ALARM ACTIVATED. WILL CONTINUE TO MONITOR AND NOTIFY MD OF CHANGES.     Goal: Individualization and Mutuality  Outcome: Ongoing (interventions implemented as appropriate)      Problem: Skin Injury Risk (Adult)  Goal: Skin Health and Integrity  Outcome: Ongoing (interventions implemented as appropriate)      Problem: Fall Risk (Adult)  Goal: Absence of Fall  Outcome: Ongoing (interventions implemented as appropriate)      Problem: Sepsis/Septic Shock (Adult)  Goal: Signs and Symptoms of Listed Potential Problems Will be Absent, Minimized or Managed (Sepsis/Septic Shock)  Outcome: Ongoing (interventions implemented as appropriate)      Problem: Nutrition, Imbalanced: Inadequate Oral Intake (Adult)  Goal: Improved Oral Intake  Outcome: Ongoing (interventions implemented as appropriate)    Goal: Prevent Further Weight Loss  Outcome: Ongoing (interventions implemented as appropriate)      Problem: Dysphagia (Adult)  Goal: Functional/Safe Swallow  Outcome: Ongoing (interventions implemented as appropriate)    Goal: Compensatory Techniques to Improve Safety/Function with Swallowing  Outcome: Ongoing (interventions implemented as appropriate)

## 2019-04-08 PROBLEM — J96.01 ACUTE HYPOXEMIC RESPIRATORY FAILURE (HCC): Status: ACTIVE | Noted: 2019-01-01

## 2019-04-08 PROBLEM — E11.8 TYPE 2 DIABETES MELLITUS WITH COMPLICATION (HCC): Status: ACTIVE | Noted: 2019-01-01

## 2019-04-08 PROBLEM — I46.9 CARDIAC ARREST (HCC): Status: ACTIVE | Noted: 2019-01-01

## 2019-04-08 PROBLEM — J15.212 MRSA PNEUMONIA (HCC): Status: ACTIVE | Noted: 2019-01-01

## 2019-04-08 PROBLEM — B95.5 BETA HEMOLYTIC STREPTOCOCCUS CULTURE POSITIVE: Status: ACTIVE | Noted: 2019-01-01

## 2019-04-08 PROBLEM — R13.10 DYSPHAGIA: Status: ACTIVE | Noted: 2019-01-01

## 2019-04-08 NOTE — PLAN OF CARE
Problem: Patient Care Overview  Goal: Plan of Care Review  Outcome: Ongoing (interventions implemented as appropriate)   04/08/19 8416   Coping/Psychosocial   Plan of Care Reviewed With patient   Plan of Care Review   Progress no change   OTHER   Outcome Summary Pt declined EOB or OOB activity. Washed face set up and VCs. Performed AROM BUEs all planes for increased endurance/strength for t/fs. Attempted 2# wand for ther ex pt reported pain R shoulder. Pt limited by cognition, understanding of task and short term memory, decreased safety. Also demo'd decreased UE strength and motor control for ADL tasks. Frequently asked for a drink of water; attempted to assist pt with self feeding of lunch tray; pt refused Notified NSG. Continue OT POC. Recommend SNF.

## 2019-04-08 NOTE — PLAN OF CARE
Problem: Patient Care Overview  Goal: Plan of Care Review  Outcome: Ongoing (interventions implemented as appropriate)   04/08/19 8151   Coping/Psychosocial   Plan of Care Reviewed With patient   Plan of Care Review   Progress no change   OTHER   Outcome Summary PUREED DIET, HONEY THICK LIQUIDS CONTINUE, POOR INTAKE THIS SHIFT. NO C/O PAIN. DISORIENTED TO SITUATION. 101.2 TEMP THIS MORNING. BLOOD CX DRAWN LAST NIGHT, IV VANC Q8H, TROUGH THIS AFTERNOON AND PHARMACY SAID THEY WILL ADJUST ACCORDINGLY. DRSGS TO FEET C/D/I. CONTINUE TO MONITOR.     Goal: Individualization and Mutuality  Outcome: Ongoing (interventions implemented as appropriate)    Goal: Discharge Needs Assessment  Outcome: Ongoing (interventions implemented as appropriate)    Goal: Interprofessional Rounds/Family Conf  Outcome: Ongoing (interventions implemented as appropriate)

## 2019-04-08 NOTE — THERAPY TREATMENT NOTE
"Acute Care - Occupational Therapy Treatment Note  [facility]     Patient Name: Vitaly Pantoja  : 1960  MRN: [id number]  Today's Date: 2019  Onset of Illness/Injury or Date of Surgery: 19  Date of Referral to OT: 19  Referring Physician: Dr. Milligan    Admit Date: 2019       ICD-10-CM ICD-9-CM   1. Shock, septic (CMS/HCC) A41.9 038.9    R65.21 785.52     995.92   2. Cardiac arrest (CMS/HCC) I46.9 427.5   3. Oropharyngeal dysphagia R13.12 787.22   4. Decreased activities of daily living (ADL) R68.89 780.99   5. Impaired mobility Z74.09 799.89     Patient Active Problem List   Diagnosis   • Sepsis (CMS/HCC)     Past Medical History:   Diagnosis Date   • Back pain     current back pain per pt   • Chronic rheumatic arthritis (CMS/[facility])    • Diabetes mellitus (CMS/[facility])    • Hip dysplasia, congenital      Past Surgical History:   Procedure Laterality Date   • APPENDECTOMY     • JOINT REPLACEMENT Bilateral     TKA   • TONSILLECTOMY         Therapy Treatment    Rehabilitation Treatment Summary     Row Name 19 1308 19 1023 19 0836       Treatment Time/Intention    Discipline  occupational therapy assistant  -MM  physical therapy assistant  -PARVEEN  speech language pathologist  -MB    Document Type  therapy note (daily note)  -MM  therapy note (daily note)  -PARVEEN  therapy note (daily note)  -MB    Subjective Information  complains of;pain with ther ex R shoulder   -MM2  complains of \"I don't feel good today\"  -JP2  complains of;fatigue  -MB    Mode of Treatment  --  --  speech-language pathology  -MB    Patient/Family Observations  --  --  No family present  -MB    Patient Effort  fair  -MM  --  fair  -MB    Existing Precautions/Restrictions  fall  -MM  fall  -JP2  --    Treatment Considerations/Comments  confused; extra time needed for understanding; Per PTA from NSG pt developmentally delayed  -MM2  developmentally delayed  -JP2  --    Recorded by [MM] Charo Petit COTA/ZOE " 04/08/19 1333  [MM2] Charo Petit LEDBETTER/L 04/08/19 1340 [PARVEEN] Marisol Cantu, PTA 04/08/19 1024  [JP2] Marisol Cantu, PTA 04/08/19 1052 [MB] Julia Nevarez, CCC-SLP 04/08/19 0932    Row Name 04/08/19 1308             Cognitive Assessment/Intervention- PT/OT    Affect/Mental Status (Cognitive)  confused  -MM      Orientation Status (Cognition)  oriented to;person;place  -MM      Follows Commands (Cognition)  WFL;follows one step commands;over 90% accuracy  -MM      Cognitive Function (Cognitive)  memory deficit  -MM      Memory Deficit (Cognitive)  mild deficit;short term memory  -MM      Personal Safety Interventions  fall prevention program maintained;gait belt;nonskid shoes/slippers when out of bed;supervised activity  -MM      Recorded by [MM] Charo Petit LEDBETTER/L 04/08/19 1333      Row Name 04/08/19 1308             Safety Issues, Functional Mobility    Impairments Affecting Function (Mobility)  balance;cognition;coordination;endurance/activity tolerance;motor planning;pain;postural/trunk control;shortness of breath;strength  -MM      Recorded by [MM] Charo Petit COTA/L 04/08/19 1333      Row Name 04/08/19 1308 04/08/19 1023          Bed Mobility Assessment/Treatment    Rolling Left [place] (Bed Mobility)  --  verbal cues;minimum assist (75% patient effort)  -PARVEEN     Supine-Sit [place] (Bed Mobility)  --  verbal cues;minimum assist (75% patient effort)  -PARVEEN     Comment (Bed Mobility)  declined EOB or OOB activiy; agrees to in bed activity only  -MM  --     Recorded by [MM] Charo Petit COTA/L 04/08/19 1333 [PARVEEN] Marisol Cantu, PTA 04/08/19 1052     Row Name 04/08/19 1308             Transfer Assessment/Treatment    Comment (Transfers)  declined OOB activity   -MM      Recorded by [MM] Charo Petit LEDBETTER/L 04/08/19 1333      Row Name 04/08/19 1023             Bed-Chair Transfer    Bed-Chair [place] (Transfers)  verbal cues;moderate assist (50% patient effort)  forward head posture  -PARVEEN      Recorded by [PARVEEN] Marisol Cantu, PTA 04/08/19 1052      Row Name 04/08/19 1023             Sit-Stand Transfer    Sit-Stand [place] (Transfers)  verbal cues;minimum assist (75% patient effort)  -PARVEEN      Recorded by [PARVEEN] Marisol Cantu, PTA 04/08/19 1052      Row Name 04/08/19 1023             Stand-Sit Transfer    Stand-Sit [place] (Transfers)  contact guard  -PARVEEN      Recorded by [PARVEEN] Marisol Cantu, PTA 04/08/19 1052      Row Name 04/08/19 1308             ADL Assessment/Intervention    BADL Assessment/Intervention  grooming;feeding  -MM      Recorded by [MM] Charo Petit COTA/L 04/08/19 1344      Row Name 04/08/19 1308             Grooming Assessment/Training    [place] Level (Grooming)  wash face, hands;set up;verbal cues  -MM      Grooming Position  sitting up in bed;supported sitting  -MM      Comment (Grooming)  decreased initiation   -MM      Recorded by [MM] Charo Petit COTA/L 04/08/19 1344      Row Name 04/08/19 1308             Self-Feeding Assessment/Training    Comment (Feeding)  attempted to assist; pt refused to eat notified NSG  -MM      Recorded by [MM] Charo Petit COTA/L 04/08/19 1344      Row Name 04/08/19 1308             BADL Safety/Performance    Impairments, BADL Safety/Performance  balance;endurance/activity tolerance;strength;coordination;grasp/prehension  -MM      Skilled BADL Treatment/Intervention  BADL process/adaptation training  -MM      Recorded by [MM] Charo Petit COTA/L 04/08/19 1344      Row Name 04/08/19 1308             Motor Skills Assessment/Interventions    Additional Documentation  Therapeutic Exercise (Group);Therapeutic Exercise Interventions (Group)  -MM      Recorded by [MM] Charo Petit COTA/L 04/08/19 1333      Row Name 04/08/19 1308 04/08/19 1023          Therapeutic Exercise    Lower Extremity (Therapeutic Exercise)  --  heel slides, bilateral;SAQ (short arc quad), bilateral  -PARVEEN      Lower Extremity Range of Motion (Therapeutic Exercise)  --  hip abduction/adduction, bilateral;hip internal/external rotation, bilateral;ankle dorsiflexion/plantar flexion, bilateral  -PARVEEN     Exercise Type (Therapeutic Exercise)  --  AROM (active range of motion)  -PARVEEN     Position (Therapeutic Exercise)  --  supine  -PARVEEN     Sets/Reps (Therapeutic Exercise)  --  15  -PARVEEN     Comment (Therapeutic Exercise)  AROM all planes BUEs x15 reps; used 2#wand for BUE bicep curls shoulder flex; pt reports pain RUE in shoulder with weight  -MM  --     Recorded by [MM] Charo Petit COTA/L 04/08/19 1333 [PARVEEN] Marisol Cantu, PTA 04/08/19 1052     Row Name 04/08/19 1023             Static Sitting Balance    Level of [place] (Supported Sitting, Static Balance)  contact guard assist;minimal assist, 75% patient effort posterior lean at times  -PARVEEN      Recorded by [PARVEEN] Marisol Cantu, PTA 04/08/19 1052      Row Name 04/08/19 1308 04/08/19 1023          Positioning and Restraints    Pre-Treatment Position  in bed  -MM  in bed  -PARVEEN     Post Treatment Position  bed  -MM  chair  -PARVEEN     In Bed  fowlers;call light within reach;encouraged to call for assist;exit alarm on;side rails up x2  -MM  --     In Chair  --  reclined;call light within reach;encouraged to call for assist;exit alarm on  -PARVEEN     Recorded by [MM] Charo Petit COTA/L 04/08/19 1333 [PARVEEN] Marisol Cantu, PTA 04/08/19 1052     Row Name 04/08/19 1023             Pain Scale: Numbers Pre/Post-Treatment    Pain Scale: Numbers, Pretreatment  0/10 - no pain  -PARVEEN      Recorded by [PARVEEN] Marisol Cantu, PTA 04/08/19 1052      Row Name 04/08/19 1308 04/08/19 0836          Pain Scale: FACES Pre/Post-Treatment    Pain: FACES Scale, Pretreatment  0-->no hurt  -MM  2-->hurts little bit  -MB     Pain: FACES Scale, Post-Treatment  0-->no hurt  -MM  --     Recorded by [MM] Charo Petit COTA/L 04/08/19 1333 [MB] Julia Nevarez, CCC-SLP 04/08/19 0932     Row  Name                Wound 04/01/19 1811 Left anterior foot pressure injury    Wound - Properties Group Date first assessed: 04/01/19 [AW] Time first assessed: 1811 [AW] Present On Admission : picture taken [AW] Side: Left [AW] Orientation: anterior [AW] Location: foot [AW] Type: pressure injury [AW] Stage, Pressure Injury: Stage 3 [AW] Recorded by:  [AW] Philly Palma RN 04/01/19 1812    Row Name                Wound 04/01/19 1817 Right anterior foot diabetic/neuropathic ulceration    Wound - Properties Group Date first assessed: 04/01/19 [AW] Time first assessed: 1817 [AW] Present On Admission : yes;picture taken [AW] Side: Right [AW] Orientation: anterior [AW] Location: foot [AW] Type: diabetic/neuropathic ulceration [AW2] Stage, Pressure Injury: -- [AW2] Recorded by:  [AW] Philly Palma RN 04/01/19 1817 [AW2] Philly Palma RN 04/02/19 1247    Row Name 04/08/19 1308             Plan of Care Review    Plan of Care Reviewed With  patient  -MM      Recorded by [MM] Charo Petit COTA/L 04/08/19 1333      Row Name 04/08/19 0836             Outcome Summary/Treatment Plan (SLP)    Daily Summary of Progress (SLP)  progress towards functional goals is fair  -MB      Barriers to Overall Progress (SLP)  Mouth sores  -MB      Plan for Continued Treatment (SLP)  Pureed diet and honey thick liquids started per MD. Continue to follow for swallow therapy  -MB      Anticipated Dischage Disposition  skilled nursing facility  -MB      Recorded by [MB] Julia Nevarez CCC-SLP 04/08/19 0932        User Key  (r) = Recorded By, (t) = Taken By, (c) = Cosigned By    Initials Name Effective Dates Discipline    Julia Saenz CCC-SLP 08/02/16 -  SLP    Marisol Figueredo PTA 08/02/16 -  PT    Philly Molina, RN 08/02/16 -  Nurse    Charo Hernandez COTA/L 10/15/18 -  OT        Wound 04/01/19 1811 Left anterior foot pressure injury (Active)   Dressing Appearance dry;intact 4/8/2019  7:40 AM    Closure JULIO 4/7/2019  7:27 PM   Base dressing in place, unable to visualize 4/7/2019  7:27 PM   Care, Wound cleansed with;sterile normal saline 4/8/2019  5:00 AM   Dressing Care, Wound dressing changed 4/8/2019  5:00 AM       Wound 04/01/19 1817 Right anterior foot diabetic/neuropathic ulceration (Active)   Dressing Appearance dry;intact 4/8/2019  7:40 AM   Closure JULIO 4/7/2019  7:27 PM   Base dressing in place, unable to visualize 4/7/2019  7:27 PM   Care, Wound cleansed with;sterile normal saline 4/8/2019  5:00 AM   Dressing Care, Wound dressing changed 4/8/2019  5:00 AM     Rehab Goal Summary     Row Name 04/08/19 0836             Oral Nutrition/Hydration Goal 1 (SLP)    Oral Nutrition/Hydration Goal 1, SLP  Pt will tolerate least restrictive diet with no overt s/s of aspiration.  -MB      Time Frame (Oral Nutrition/Hydration Goal 1, SLP)  by discharge  -MB      Barriers (Oral Nutrition/Hydration Goal 1, SLP)  none  -MB      Progress/Outcomes (Oral Nutrition/Hydration Goal 1, SLP)  continuing progress toward goal  -MB         Pharyngeal Strengthening Exercise Goal 1 (SLP)    Activity (Pharyngeal Strengthening Goal 1, SLP)  increase superior movement of the hyolaryngeal complex;increase anterior movement of the hyolaryngeal complex;increase squeeze/positive pressure generation  -MB      Increase Superior Movement of the Hyolaryngeal Complex  Mendelsohn NMES  -MB      Increase Anterior Movement of the Hyolaryngeal Complex  shaker  -MB      Increase Squeeze/Positive Pressure Generation  hard effortful swallow;marsha  -MB      [place]/Accuracy (Pharyngeal Strengthening Goal 1, SLP)  with minimal cues (75-90% accuracy)  -MB      Time Frame (Pharyngeal Strengthening Goal 1, SLP)  short term goal (STG);by discharge  -MB      Barriers (Pharyngeal Strengthening Goal 1, SLP)  mouth sores  -MB      Progress/Outcomes (Pharyngeal Strengthening Goal 1, SLP)  continuing progress toward goal  -MB        User Key  (r) =  Recorded By, (t) = Taken By, (c) = Cosigned By    Initials Name Provider Type Discipline    Julia Saenz, CCC-SLP Speech and Language Pathologist SLP        Occupational Therapy Education     Title: PT OT SLP Therapies (In Progress)     Topic: Occupational Therapy (Done)     Point: ADL training (Done)     Description: Instruct learner(s) on proper safety adaptation and remediation techniques during self care or transfers.   Instruct in proper use of assistive devices.    Learning Progress Summary           Patient Acceptance, E, NL,VU by OREN at 4/8/2019  1:41 PM    Comment:  Pt confused; difficulty with memory; re education required; educated on OT POC, ther ex, need for OOB activity                               User Key     Initials Effective Dates Name Provider Type Discipline    OREN 10/15/18 -  Charo Petit COTA/L Occupational Therapy Assistant OT                OT Recommendation and Plan     Plan of Care Review  Plan of Care Reviewed With: patient  Plan of Care Reviewed With: patient  Outcome Summary: Pt declined EOB or OOB activity. Washed face set up and VCs. Performed AROM BUEs all planes for increased endurance/strength for t/fs. Attempted 2# wand for ther ex pt reported pain R shoulder. Pt limited by cognition, understanding of task and short term memory. Frequently asked for a drink of water; attempted to assist pt with self feeding of lunch tray; pt refused Notified NSG. Continue OT POC. Recommend SNF.   Outcome Measures     Row Name 04/08/19 1308 04/05/19 1505 04/05/19 1405       How much help from another person do you currently need...    Turning from your back to your side while in flat bed without using bedrails?  --  --  2  -AIME (r) GR (t) AIME (c)    Moving from lying on back to sitting on the side of a flat bed without bedrails?  --  --  2  -AIME (r) GR (t) AIME (c)    Moving to and from a bed to a chair (including a wheelchair)?  --  --  2  -AIME (r) GR (t) AIME (c)    Standing up from a chair  using your arms (e.g., wheelchair, bedside chair)?  --  --  2  -AIME (r) GR (t) AIME (c)    Climbing 3-5 steps with a railing?  --  --  1  -AIME (r) GR (t) AIME (c)    To walk in hospital room?  --  --  1  -AIME (r) GR (t) AIME (c)    AM-PAC 6 Clicks Score  --  --  10  -AIME (r) GR (t)       How much help from another is currently needed...    Putting on and taking off regular lower body clothing?  2  -MM  2  -AC  --    Bathing (including washing, rinsing, and drying)  2  -MM  2  -AC  --    Toileting (which includes using toilet bed pan or urinal)  2  -MM  2  -AC  --    Putting on and taking off regular upper body clothing  2  -MM  2  -AC  --    Taking care of personal grooming (such as brushing teeth)  2  -MM  2  -AC  --    Eating meals  3  -MM  3  -AC  --    Score  13  -MM  13  -AC  --       Functional Assessment    Outcome Measure Options  --  AM-PAC 6 Clicks Daily Activity (OT)  -AC  AM-PAC 6 Clicks Basic Mobility (PT)  -AIME (r) GR (t) AIME (c)      User Key  (r) = Recorded By, (t) = Taken By, (c) = Cosigned By    Initials Name Provider Type    Lisandro Schultz, OTR/L Occupational Therapist    Fadi Johnson, PT DPT Physical Therapist    Charo Hernandez COTA/L Occupational Therapy Assistant    Shyann Nielsen, PT Student PT Student           Time Calculation:   Time Calculation- OT     Row Name 04/08/19 1308             Time Calculation- OT    OT Start Time  1308  -MM      OT Stop Time  1339  -MM      OT Time Calculation (min)  31 min  -MM      Total Timed Code Minutes- OT  31 minute(s)  -MM      OT Received On  04/08/19  -MM         Timed Charges    69061 - OT Therapeutic Exercise Minutes  20  -MM      85913 - OT Self Care/Mgmt Minutes  11  -MM        User Key  (r) = Recorded By, (t) = Taken By, (c) = Cosigned By    Initials Name Provider Type    Charo Hernandez COTA/L Occupational Therapy Assistant        Therapy Charges for Today     Code Description Service Date Service Provider Modifiers Qty     47497489389 HC OT THER PROC EA 15 MIN 4/8/2019 Charo Petit COTA/L GO, KX 1    56846389828 HC OT SELF CARE/MGMT/TRAIN EA 15 MIN 4/8/2019 Charo Petit COTA/L GO, KX 1          OT G-codes  OT Professional Judgement Used?: Yes  OT Functional Scales Options: AM-PAC 6 Clicks Daily Activity (OT)  Score: 13  Functional Limitation: Self care  Self Care Current Status (): At least 60 percent but less than 80 percent impaired, limited or restricted  Self Care Goal Status (): At least 40 percent but less than 60 percent impaired, limited or restricted    ANDREA Machado  4/8/2019

## 2019-04-08 NOTE — PLAN OF CARE
Problem: Patient Care Overview  Goal: Plan of Care Review  Outcome: Ongoing (interventions implemented as appropriate)   04/08/19 1054   Coping/Psychosocial   Plan of Care Reviewed With patient   OTHER   Outcome Summary Pt. requires min assist for supine to sit. Min assist to stand. mod assist to take a few small steps to bedside chair. Tolerated well.

## 2019-04-08 NOTE — PROGRESS NOTES
Continued Stay Note  [facility]     Patient Name: Vitaly Pantoja  MRN: [id number]  Today's Date: 4/8/2019    Admit Date: 4/1/2019    Discharge Plan     Row Name 04/08/19 0829       Plan    Plan  VIJI spoke to Kely at [facility], personal care home, [phone].  She stated they can take pt back and care for his wounds and assist from bed to chair and assist to stand.  She stated they cannot take him back if he is on iv abx.  VIJI will follow.  CECILIA Lucas.     Patient/Family in Agreement with Plan  yes        Discharge Codes    No documentation.             CECILIA Chan

## 2019-04-08 NOTE — PROGRESS NOTES
[facility] Medicine Services  INPATIENT PROGRESS NOTE    Patient Name: Vitaly Pantoja  Date of Admission: 4/1/2019  Today's Date: 04/08/19  Length of Stay: 7  Primary Care Physician: Emerson Sandhu MD    Subjective   Chief Complaint: Shortness of breath  HPI     Patient was seen and examined at bedside.  Patient is overall doing much better.  Indicates that he does not have any significant shortness of breath.  He does have some loose stools.  Patient has been febrile on multiple occasions over the past 24 hours.  He received blood cultures yesterday that are showing no growth today.  He remains on vancomycin, however troughs have been a little bit on the lower side.  Patient is requesting to go home soon.  Patient has had poor p.o. intake over the last couple of days.  He indicates that some of it is due to not being interested in the food.        Review of Systems   Constitutional: Positive for appetite change (poor ), chills and fever. Negative for activity change.   Respiratory: Negative for cough (resolved) and shortness of breath.    Cardiovascular: Negative for chest pain and palpitations.   Gastrointestinal: Negative for abdominal distention, abdominal pain, anal bleeding, constipation, diarrhea, nausea and vomiting.      All pertinent negatives and positives are as above. All other systems have been reviewed and are negative unless otherwise stated.     Objective    Temp:  [98.6 °F (37 °C)-101.2 °F (38.4 °C)] 99.2 °F (37.3 °C)  Heart Rate:  [75-95] 85  Resp:  [16-20] 16  BP: (120-143)/(66-74) 120/70  Physical Exam   Constitutional: No distress.   HENT:   Head: Normocephalic and atraumatic.   Eyes: Conjunctivae are normal. No scleral icterus.   Neck: No JVD present.   Cardiovascular: Normal rate and regular rhythm. Exam reveals no gallop and no friction rub.   No murmur heard.  Pulmonary/Chest: Effort normal and breath sounds normal. No respiratory distress. He has  no wheezes. He has no rales.   Abdominal: Soft. Bowel sounds are normal. He exhibits no distension and no mass. There is tenderness. There is no rebound and no guarding. No hernia.   Musculoskeletal: He exhibits no edema.   Neurological: He is alert.   Oriented to person and place, but not situation   Skin: Skin is warm and dry. He is not diaphoretic. No erythema.   Psychiatric: He has a normal mood and affect. His behavior is normal.   Nursing note and vitals reviewed.          Results Review:  I have reviewed the labs, radiology results, and diagnostic studies.    Laboratory Data:   Results from last 7 days   Lab Units 04/08/19  0410 04/07/19  0254 04/06/19  0410   WBC 10*3/mm3 13.79* 14.33* 12.20*   HEMOGLOBIN g/dL 10.6* 10.6* 11.1*   HEMATOCRIT % 32.7* 32.5* 34.6*   PLATELETS 10*3/mm3 286 246 258        Results from last 7 days   Lab Units 04/07/19  0254 04/06/19  0410 04/05/19  1550 04/05/19  0740 04/03/19  0258 04/02/19  0236   SODIUM mmol/L 138 140 140 144 140 135   POTASSIUM mmol/L 3.4* 3.2* 3.1* 2.7* 3.5 5.6*   CHLORIDE mmol/L 96* 98 97* 101 101 101   CO2 mmol/L 36.0* 31.0 34.0* 34.0* 27.0 15.0*   BUN mg/dL 11 13 17 19 49* 60*   CREATININE mg/dL 0.65 0.56 0.67 0.56 1.30 2.45*   CALCIUM mg/dL 7.2* 7.4* 7.8* 7.7* 7.0* 7.0*   BILIRUBIN mg/dL  --   --   --  0.8 0.6 1.2*   ALK PHOS U/L  --   --   --  121* 168* 223*   ALT (SGPT) U/L  --   --   --  39 56* 67*   AST (SGOT) U/L  --   --   --  27 42 158*   GLUCOSE mg/dL 168* 203* 235* 177* 402* 213*       Culture Data:   Blood Culture   Date Value Ref Range Status   04/07/2019 No growth at less than 24 hours  Preliminary   04/07/2019 No growth at less than 24 hours  Preliminary   04/03/2019 No growth at 5 days  Final   04/01/2019 No growth at 5 days  Final   04/01/2019 No growth at 5 days  Final     Urine Culture   Date Value Ref Range Status   04/01/2019 No growth at 2 days  Final     Respiratory Culture   Date Value Ref Range Status   04/01/2019 Heavy growth (4+)  Normal Respiratory Danica  Final   04/01/2019 Moderate growth (3+) Staphylococcus aureus, MRSA (A)  Final     Comment:       Methicillin resistant Staphylococcus aureus, Patient may be an isolation risk.       Radiology Data:   Imaging Results (last 24 hours)     ** No results found for the last 24 hours. **          I have reviewed the patient's current medications.     Assessment/Plan     Active Hospital Problems    Diagnosis   • Dysphagia   • MRSA pneumonia (CMS/[facility])   • Cardiac arrest (CMS/[facility]), PEA    • Type 2 diabetes mellitus with complication (CMS/[facility]), hyperglycemia and peripheral neuropathy    • Beta hemolytic Streptococcus culture positive, bacteremia at OSH    • Acute hypoxemic respiratory failure (CMS/[facility])   • Septic shock      Plan:  1.  Continue IV Vanco - day 7/14 o  2.  Surveillance blood cultures from 4/1 and 4/3 negative  3.  Procalcitonin improved  4.  C diff pending  5.  Magic mouthwash and Mycelex cande for oral lesions  6.  PO Amio and Metoprolol; HR well controlled  7.  ST recommendations reviewed; currently appears to be doing well with trial of pureed diet with honey thick liquids; he will need assistance with ALL feeding. 8.  Wound care  9.  PO Eliquis for anticoagulation given [id number] score  10. Continue lisinopril   11.  SSI coverage; PO Metformin added; A1c is 9.1  12.  Weaned down to room air  13.  Condom catheter in place  15.  Physical therapy; increase activity as tolerated; currently OOB to chair  16.  Dispo planning: patient was at [facility] prior to this hospitalization will need SNF for duration of antibiotics.   17.  PICC consult                    Discharge Planning: I expect the patient to be discharged to SNF in ? Days.      Jadon Burnett MD   04/08/19   4:10 PM

## 2019-04-08 NOTE — PLAN OF CARE
Problem: Patient Care Overview  Goal: Plan of Care Review  Outcome: Ongoing (interventions implemented as appropriate)   04/08/19 5531   Coping/Psychosocial   Plan of Care Reviewed With patient   Plan of Care Review   Progress no change   OTHER   Outcome Summary Pt completed 25 minutes of NMES on A1 setting at 26.5 mA. He swallowed during contractions times approximately 15 to 20% of the time. Pt completed trials of ice chips during NMES with no overt s/s of aspiration, however he did exhibit silent aspiration on a previous VFSS in radiology. Also observed nurseDomonique give pt meds crushed in applesauce. Again no ovet s/s of aspiration were observed, but aspiration cannot be ruled out as the pt may experience silent aspiration. Educated pt to use chin tuck during swallows. SLP will continue to follow and treat for dysphagia.

## 2019-04-08 NOTE — THERAPY TREATMENT NOTE
Acute Care - Speech Language Pathology   Swallow Treatment Note [facility]     Patient Name: Vitaly Pantoja  : 1960  MRN: [id number]  Today's Date: 2019  Onset of Illness/Injury or Date of Surgery: 19     Referring Physician: Dr. Milligan      Admit Date: 2019  Pt completed 25 minutes of NMES on A1 setting at 26.5 mA. He swallowed during contractions times approximately 15 to 20% of the time. Pt completed trials of ice chips during NMES with no overt s/s of aspiration, however he did exhibit silent aspiration on a previous VFSS in radiology. Also observed nurse, Domonique give pt meds crushed in applesauce. Again no ovet s/s of aspiration were observed, but aspiration cannot be ruled out as the pt may experience silent aspiration. Educated pt to use chin tuck during swallows. SLP will continue to follow and treat for dysphagia.   CHRISTINA Cody 2019 9:39 AM    Visit Dx:      ICD-10-CM ICD-9-CM   1. Shock, septic (CMS/HCC) A41.9 038.9    R65.21 785.52     995.92   2. Cardiac arrest (CMS/HCC) I46.9 427.5   3. Oropharyngeal dysphagia R13.12 787.22   4. Decreased activities of daily living (ADL) R68.89 780.99   5. Impaired mobility Z74.09 799.89     Patient Active Problem List   Diagnosis   • Sepsis (CMS/HCC)       Therapy Treatment  Rehabilitation Treatment Summary     Row Name 19 0836             Treatment Time/Intention    Discipline  speech language pathologist  -MB      Document Type  therapy note (daily note)  -MB      Subjective Information  complains of;fatigue  -MB      Mode of Treatment  speech-language pathology  -MB      Patient/Family Observations  No family present  -MB      Patient Effort  fair  -MB      Recorded by [MB] Julia Nevarez CCC-SLP 19 0925      Row Name 1936             Pain Scale: FACES Pre/Post-Treatment    Pain: FACES Scale, Pretreatment  2-->hurts little bit  -MB      Recorded by [MB] Julia Nevarez CCC-SLP 19 0950       Row Name                Wound 04/01/19 1811 Left anterior foot pressure injury    Wound - Properties Group Date first assessed: 04/01/19 [AW] Time first assessed: 1811 [AW] Present On Admission : picture taken [AW] Side: Left [AW] Orientation: anterior [AW] Location: foot [AW] Type: pressure injury [AW] Stage, Pressure Injury: Stage 3 [AW] Recorded by:  [AW] Philly Palma RN 04/01/19 1812    Row Name                Wound 04/01/19 1817 Right anterior foot diabetic/neuropathic ulceration    Wound - Properties Group Date first assessed: 04/01/19 [AW] Time first assessed: 1817 [AW] Present On Admission : yes;picture taken [AW] Side: Right [AW] Orientation: anterior [AW] Location: foot [AW] Type: diabetic/neuropathic ulceration [AW2] Stage, Pressure Injury: -- [AW2] Recorded by:  [AW] Philly Palma RN 04/01/19 1817 [AW2] Philly Palma RN 04/02/19 1247    Row Name 04/08/19 0836             Outcome Summary/Treatment Plan (SLP)    Daily Summary of Progress (SLP)  progress towards functional goals is fair  -MB      Barriers to Overall Progress (SLP)  Mouth sores  -MB      Plan for Continued Treatment (SLP)  Pureed diet and honey thick liquids started per MD. Continue to follow for swallow therapy  -MB      Anticipated Dischage Disposition  [facility] nursing facility  -MB      Recorded by [MB] Julia Nevarez CCC-SLP 04/08/19 0932        User Key  (r) = Recorded By, (t) = Taken By, (c) = Cosigned By    Initials Name Effective Dates Discipline    Julia Saenz CCC-SLP 08/02/16 -  SLP    AW Philly Palma RN 08/02/16 -  Nurse          Outcome Summary  Outcome Summary/Treatment Plan (SLP)  Daily Summary of Progress (SLP): progress towards functional goals is fair (04/08/19 0836 : Julia Nevarez CCC-SLP)  Barriers to Overall Progress (SLP): Mouth sores (04/08/19 0836 : Julia Nevarez CCC-SLP)  Plan for Continued Treatment (SLP): Pureed diet and honey thick liquids started per MD. Continue  to follow for swallow therapy (04/08/19 0836 : Julia Nevarez, CCC-SLP)  Anticipated Dischage Disposition: skilled nursing facility (04/08/19 0836 : Julia Nevarez, CCC-SLP)      SLP GOALS     Row Name 04/08/19 0836 04/05/19 1504 04/05/19 0952       Oral Nutrition/Hydration Goal 1 (SLP)    Oral Nutrition/Hydration Goal 1, SLP  Pt will tolerate least restrictive diet with no overt s/s of aspiration.  -MB  Pt will tolerate least restrictive diet with no overt s/s of aspiration.  -MB  Pt will tolerate least restrictive diet with no overt s/s of aspiration.  -MB    Time Frame (Oral Nutrition/Hydration Goal 1, SLP)  by discharge  -MB  by discharge  -MB  by discharge  -MB    Barriers (Oral Nutrition/Hydration Goal 1, SLP)  none  -MB  none  -MB  none  -MB    Progress/Outcomes (Oral Nutrition/Hydration Goal 1, SLP)  continuing progress toward goal  -MB  goal ongoing  -MB  continuing progress toward goal  -MB       Pharyngeal Strengthening Exercise Goal 1 (SLP)    Activity (Pharyngeal Strengthening Goal 1, SLP)  increase superior movement of the hyolaryngeal complex;increase anterior movement of the hyolaryngeal complex;increase squeeze/positive pressure generation  -MB  increase superior movement of the hyolaryngeal complex;increase anterior movement of the hyolaryngeal complex;increase squeeze/positive pressure generation  -MB  --    Increase Superior Movement of the Hyolaryngeal Complex  Mendelsohn NMES  -MB  Mendelsohn NMES  -MB  --    Increase Anterior Movement of the Hyolaryngeal Complex  shaker  -MB  shaker  -MB  --    Increase Squeeze/Positive Pressure Generation  hard effortful swallow;marsha  -MB  hard effortful swallow;marsha  -MB  --    [place]/Accuracy (Pharyngeal Strengthening Goal 1, SLP)  with minimal cues (75-90% accuracy)  -MB  with minimal cues (75-90% accuracy)  -MB  --    Time Frame (Pharyngeal Strengthening Goal 1, SLP)  short term goal (STG);by discharge  -MB  short term goal (STG);by  discharge  -MB  --    Barriers (Pharyngeal Strengthening Goal 1, SLP)  mouth sores  -MB  n/a  -MB  --    Progress/Outcomes (Pharyngeal Strengthening Goal 1, SLP)  continuing progress toward goal  -MB  goal ongoing  -MB  --      User Key  (r) = Recorded By, (t) = Taken By, (c) = Cosigned By    Initials Name Provider Type    Julia Saenz CCC-SLP Speech and Language Pathologist          EDUCATION  The patient has been educated in the following areas:   Dysphagia (Swallowing Impairment).    SLP Recommendation and Plan  Daily Summary of Progress (SLP): progress towards functional goals is fair  Barriers to Overall Progress (SLP): Mouth sores  Plan for Continued Treatment (SLP): Pureed diet and honey thick liquids started per MD. Continue to follow for swallow therapy  Anticipated Dischage Disposition: skilled nursing facility                SLP Outcome Measures (last 72 hours)      SLP Outcome Measures     Row Name 04/06/19 1100             SLP Outcome Measures    Outcome Measure Used?  Adult NOMS  -PH         Adult FCM Scores    FCM Chosen  Swallowing  -PH      Swallowing FCM Score  7  -PH        User Key  (r) = Recorded By, (t) = Taken By, (c) = Cosigned By    Initials Name Effective Dates    PH Araseli Arita CCC-SLP 08/02/16 -              Time Calculation:   Time Calculation- SLP     Row Name 04/08/19 0938             Time Calculation- SLP    SLP Start Time  0836  -MB      SLP Stop Time  0926  -MB      SLP Time Calculation (min)  50 min  -MB      SLP Received On  04/08/19  -MB        User Key  (r) = Recorded By, (t) = Taken By, (c) = Cosigned By    Initials Name Provider Type    Julia Saenz CCC-SLP Speech and Language Pathologist          Therapy Charges for Today     Code Description Service Date Service Provider Modifiers Qty    [id number]  ST TREATMENT SWALLOW 3 4/8/2019 Julia Nevarez CCC-SLP GN, KX 1                 CHRISTINA Cody  4/8/2019

## 2019-04-08 NOTE — PLAN OF CARE
Problem: Patient Care Overview  Goal: Plan of Care Review  Outcome: Ongoing (interventions implemented as appropriate)   04/08/19 0100 04/08/19 0408   Coping/Psychosocial   Plan of Care Reviewed With patient --    Plan of Care Review   Progress --  no change   OTHER   Outcome Summary --  VSS. PUREE FOODS/HONEY THICK LIQUIDS. MEDS GIVEN IN PUDDING. TURN Q2HR. STRAIGHT CATH FOR UA. NO BACTERIA SEEN. LOOSE, SEEDY, YELLOW/BROWN STOOLS. BARRIER CREAM APPLIED. DISORIENTED TO SITUATION. IV ABX GIVEN PER ORDER. FALL RISK PROTOCOL IN PLACE. BED ALARM ACTIVATED. ORAL CARE COMPLETED. NSR 82-91 ON TELEMETRY. DROPLET/CONTACT ISOLATION MAINTAINED. WILL CONTINUE TO MONITOR AND NOTIFY MD OF CHANGES.     Goal: Individualization and Mutuality  Outcome: Ongoing (interventions implemented as appropriate)      Problem: Skin Injury Risk (Adult)  Goal: Skin Health and Integrity  Outcome: Ongoing (interventions implemented as appropriate)      Problem: Fall Risk (Adult)  Goal: Absence of Fall  Outcome: Ongoing (interventions implemented as appropriate)      Problem: Sepsis/Septic Shock (Adult)  Goal: Signs and Symptoms of Listed Potential Problems Will be Absent, Minimized or Managed (Sepsis/Septic Shock)  Outcome: Ongoing (interventions implemented as appropriate)      Problem: Nutrition, Imbalanced: Inadequate Oral Intake (Adult)  Goal: Improved Oral Intake  Outcome: Ongoing (interventions implemented as appropriate)    Goal: Prevent Further Weight Loss  Outcome: Ongoing (interventions implemented as appropriate)      Problem: Dysphagia (Adult)  Goal: Functional/Safe Swallow  Outcome: Ongoing (interventions implemented as appropriate)    Goal: Compensatory Techniques to Improve Safety/Function with Swallowing  Outcome: Ongoing (interventions implemented as appropriate)

## 2019-04-09 NOTE — PLAN OF CARE
Problem: Patient Care Overview  Goal: Plan of Care Review  Outcome: Ongoing (interventions implemented as appropriate)   04/09/19 7469   Coping/Psychosocial   Plan of Care Reviewed With patient   Plan of Care Review   Progress no change   OTHER   Outcome Summary OT txt completed. Pt appears fatigued, reclined back in chair upon entering room. Stands x2 to work on standing balance activity, modA for stand and min-modA for standing balance for ~10 seconds. Pt unable to tolerate standing and refuses further activity d/t fatigue. Cont OT POC. Rec d/c to SNF.

## 2019-04-09 NOTE — PLAN OF CARE
Problem: Patient Care Overview  Goal: Plan of Care Review  Outcome: Ongoing (interventions implemented as appropriate)   04/09/19 9369   Coping/Psychosocial   Plan of Care Reviewed With patient   Plan of Care Review   Progress no change   OTHER   Outcome Summary Pt completed 15 minutes of NMES on A1 setting at 26.5 mA. He swallowed during contractions approximately 30% of the time. He completed ice chip trials during NMES with no overt s/s of aspiration. SLP encouraged pt to complete 20 minutes of NMES, but he declined and wanted to stop at 15. SLP will continue to follow.

## 2019-04-09 NOTE — CONSULTS
8cm PowerGlide Midline inserted into right cephalic vein with ultrasound guidance.  Flushes and draws without difficulty.  Dressing applied.

## 2019-04-09 NOTE — THERAPY TREATMENT NOTE
Acute Care - Occupational Therapy Treatment Note  [facility]     Patient Name: Vitaly Pantoja  : 1960  MRN: [id number]  Today's Date: 2019  Onset of Illness/Injury or Date of Surgery: 19  Date of Referral to OT: 19  Referring Physician: Dr. Milligan    Admit Date: 2019       ICD-10-CM ICD-9-CM   1. Shock, septic (CMS/HCC) A41.9 038.9    R65.21 785.52     995.92   2. Cardiac arrest (CMS/HCC) I46.9 427.5   3. Oropharyngeal dysphagia R13.12 787.22   4. Decreased activities of daily living (ADL) R68.89 780.99   5. Impaired mobility Z74.09 799.89     Patient Active Problem List   Diagnosis   • Septic shock    • Dysphagia   • MRSA pneumonia (CMS/HCC)   • Cardiac arrest (CMS/[facility]), PEA    • Type 2 diabetes mellitus with complication (CMS/[facility]), hyperglycemia and peripheral neuropathy    • Beta hemolytic Streptococcus culture positive, bacteremia at OSH    • Acute hypoxemic respiratory failure (CMS/HCC)     Past Medical History:   Diagnosis Date   • Back pain     current back pain per pt   • Chronic rheumatic arthritis (CMS/HCC)    • Diabetes mellitus (CMS/HCC)    • Hip dysplasia, congenital      Past Surgical History:   Procedure Laterality Date   • APPENDECTOMY     • JOINT REPLACEMENT Bilateral     TKA   • TONSILLECTOMY         Therapy Treatment    Rehabilitation Treatment Summary     Row Name 19 1510 19 1322 19 1301       Treatment Time/Intention    Discipline  occupational therapist  -MW  speech language pathologist  -MB  physical therapy assistant  -AF    Document Type  therapy note (daily note)  -MW  therapy note (daily note)  -MB  therapy note (daily note)  -AF    Subjective Information  complains of;weakness;fatigue  -MW2  complains of;pain  -MB  complains of;weakness;fatigue  -AF2    Mode of Treatment  occupational therapy  -MW2  speech-language pathology  -MB  --    Patient/Family Observations  --  No family present  -MB  --    Patient Effort  --  adequate  -MB  --     Existing Precautions/Restrictions  --  --  fall  -AF    Recorded by [MW] Lay Florentino, OTR/L 04/09/19 1510  [MW2] Lay Florentino, OTR/L 04/09/19 1544 [MB] Julia Nevarez, CCC-SLP 04/09/19 1352 [AF] Habersham, Sia, PTA 04/09/19 1316  [AF2] Habersham, Sia, PTA 04/09/19 1327    Row Name 04/09/19 1510 04/09/19 1327          Bed Mobility Assessment/Treatment    Rolling Left [place] (Bed Mobility)  --  verbal cues;minimum assist (75% patient effort)  -AF     Supine-Sit [place] (Bed Mobility)  --  verbal cues;moderate assist (50% patient effort)  -AF     Comment (Bed Mobility)  up in chair  -MW  --     Recorded by [MW] Lay Florentino OTR/L 04/09/19 1544 [AF] Tanya, Sia, PTA 04/09/19 1331     Row Name 04/09/19 1510             Functional Mobility    Functional Mobility- Ind. Level  unable to perform  -MW      Recorded by [MW] Lay Florentino OTR/L 04/09/19 1544      Row Name 04/09/19 1510             Transfer Assessment/Treatment    Transfer Assessment/Treatment  sit-stand transfer;stand-sit transfer  -MW      Comment (Transfers)  stood x2, increased processing time required  -MW      Recorded by [MW] Lay Florentino OTR/L 04/09/19 1544      Row Name 04/09/19 1510 04/09/19 1327          Sit-Stand Transfer    Sit-Stand [place] (Transfers)  moderate assist (50% patient effort);verbal cues  -MW  verbal cues;moderate assist (50% patient effort)  -AF     Recorded by [MW] Lay Florentino OTR/L 04/09/19 1544 [AF] Tanya, Sia, PTA 04/09/19 1331     Row Name 04/09/19 1510 04/09/19 1327          Stand-Sit Transfer    Stand-Sit [place] (Transfers)  moderate assist (50% patient effort);verbal cues  -MW  verbal cues;minimum assist (75% patient effort)  -AF     Recorded by [MW] Lay Florentino, OTR/L 04/09/19 1544 [AF] Sia Martínez, PTA 04/09/19 1331     Row Name 04/09/19 1327             Gait/Stairs Assessment/Training    [place] Level (Gait)  moderate assist (50%  patient effort);verbal cues  -AF      Distance in Feet (Gait)  2  -AF      Recorded by [AF] Sia Martínez, MICHELLE 04/09/19 1331      Row Name 04/09/19 1327             Therapeutic Exercise    Lower Extremity Range of Motion (Therapeutic Exercise)  ankle dorsiflexion/plantar flexion, bilateral;knee flexion/extension, bilateral  -AF      Exercise Type (Therapeutic Exercise)  AROM (active range of motion)  -AF      Position (Therapeutic Exercise)  seated  -AF      Sets/Reps (Therapeutic Exercise)  10  -AF      Recorded by [AF] Sia Martínez, PTA 04/09/19 1331      Row Name 04/09/19 1510             Balance    Balance  static standing balance  -MW      Recorded by [MW] Lay Florentino OTR/L 04/09/19 1544      Row Name 04/09/19 1327             Static Sitting Balance    Level of [place] (Supported Sitting, Static Balance)  contact guard assist;minimal assist, 75% patient effort  -AF      Sitting Position (Supported Sitting, Static Balance)  sitting on edge of bed  -AF      Time Able to Maintain Position (Supported Sitting, Static Balance)  3 to 4 minutes  -AF      Recorded by [AF] Sia Martínez, MICHELLE 04/09/19 1331      Row Name 04/09/19 1510             Static Standing Balance    Level of [place] (Unsupported Standing, Static Balance)  minimal assist, 75% patient effort  -MW      Recorded by [MW] Lay Florentino OTR/L 04/09/19 1544      Row Name 04/09/19 1510 04/09/19 1327          Positioning and Restraints    Pre-Treatment Position  sitting in chair/recliner  -MW  in bed  -AF     Post Treatment Position  chair  -MW  chair  -AF     In Chair  sitting;call light within reach;encouraged to call for assist  -MW  sitting;call light within reach;exit alarm on;notified nsg  -AF     Recorded by [MW] Lay Florentino OTR/L 04/09/19 1544 [AF] Sia Martínez, [facility] 04/09/19 1331     Row Name 04/09/19 1510             Pain Assessment    Additional Documentation  Pain Scale: FACES Pre/Post-Treatment (Group)   -MW      Recorded by [MW] Lay Florentino OTR/L 04/09/19 1544      Row Name 04/09/19 1327             Pain Scale: Numbers Pre/Post-Treatment    Pain Scale: Numbers, Pretreatment  10/10  -AF      Pain Scale: Numbers, Post-Treatment  9/10  -AF      Recorded by [AF] GarlandElizabethSia, PTA 04/09/19 1331      Row Name 04/09/19 1510 04/09/19 1322          Pain Scale: FACES Pre/Post-Treatment    Pain: FACES Scale, Pretreatment  2-->hurts little bit  -MW  2-->hurts little bit  -MB     Pain: FACES Scale, Post-Treatment  2-->hurts little bit  -MW  --     Recorded by [MW] Lay Florentino OTR/L 04/09/19 1544 [MB] Julia Nevarez CCC-[facility] 04/09/19 1352     Row Name                Wound 04/01/19 1811 Left anterior foot pressure injury    Wound - Properties Group Date first assessed: 04/01/19 [AW] Time first assessed: 1811 [AW] Present On Admission : picture taken [AW] Side: Left [AW] Orientation: anterior [AW] Location: foot [AW] Type: pressure injury [AW] Stage, Pressure Injury: Stage 3 [AW] Recorded by:  [AW] Philly Palma RN 04/01/19 1812    Row Name                Wound 04/01/19 1817 Right anterior foot diabetic/neuropathic ulceration    Wound - Properties Group Date first assessed: 04/01/19 [AW] Time first assessed: 1817 [AW] Present On Admission : yes;picture taken [AW] Side: Right [AW] Orientation: anterior [AW] Location: foot [AW] Type: diabetic/neuropathic ulceration [AW2] Stage, Pressure Injury: -- [AW2] Recorded by:  [AW] Philly Palma RN 04/01/19 1817 [AW2] Philly Palma RN 04/02/19 1247    Row Name 04/09/19 1510             Plan of Care Review    Plan of Care Reviewed With  patient  -MW      Recorded by [MW] Lay Florentino OTR/L 04/09/19 1544      Row Name 04/09/19 1510             Outcome Summary/Treatment Plan (OT)    Daily Summary of Progress (OT)  progress toward functional goals is gradual  -MW      Anticipated Discharge Disposition (OT)  skilled nursing facility  -MW      Recorded by [MW]  Lay Florentino, OTR/L 04/09/19 1544      Row Name 04/09/19 1322             Outcome Summary/Treatment Plan (SLP)    Daily Summary of Progress (SLP)  progress towards functional goals is fair  -MB      Barriers to Overall Progress (SLP)  Cognition  -MB      Plan for Continued Treatment (SLP)  Continue to follow  -MB      Anticipated Dischage Disposition  skilled nursing facility  -MB      Recorded by [MB] Julia Nevarez, CCC-SLP 04/09/19 1352        User Key  (r) = Recorded By, (t) = Taken By, (c) = Cosigned By    Initials Name Effective Dates Discipline    MB Julia Nevraez CCC-SLP 08/02/16 -  SLP    Philly Molina, RN 08/02/16 -  Nurse    MW Lya Florentino, OTR/L 08/28/18 -  OT    AF Sia Martínez PTA 02/11/19 -  PT        Wound 04/01/19 1811 Left anterior foot pressure injury (Active)   Dressing Appearance intact;moist drainage 4/9/2019  8:01 AM   Closure None 4/9/2019  8:01 AM   Base granulating;pink 4/9/2019  8:01 AM   Periwound intact;moist 4/9/2019  8:01 AM   Periwound Temperature warm 4/9/2019  8:01 AM   Periwound Skin Turgor firm 4/9/2019  8:01 AM   Edges open 4/9/2019  8:01 AM   Drainage Characteristics/Odor serosanguineous;yellow 4/9/2019  8:01 AM   Drainage Amount scant 4/9/2019  8:01 AM   Care, Wound cleansed with;sterile normal saline;moist wound environment maintained 4/9/2019  6:15 AM   Dressing Care, Wound dressing changed;gauze;petroleum-based 4/9/2019  6:15 AM       Wound 04/01/19 1817 Right anterior foot diabetic/neuropathic ulceration (Active)   Dressing Appearance dry;intact;no drainage 4/9/2019  8:01 AM   Closure None 4/9/2019  8:01 AM   Base granulating;pink 4/9/2019  8:01 AM   Periwound pink;moist 4/9/2019  8:01 AM   Periwound Temperature warm 4/9/2019  8:01 AM   Periwound Skin Turgor firm 4/9/2019  8:01 AM   Edges callused 4/9/2019  8:01 AM   Drainage Amount none 4/9/2019  8:01 AM   Care, Wound cleansed with;sterile normal saline;moist wound environment maintained  4/9/2019  6:15 AM   Dressing Care, Wound dressing changed;gauze;petroleum-based 4/9/2019  6:15 AM     Rehab Goal Summary     Row Name 04/09/19 1322             Oral Nutrition/Hydration Goal 1 (SLP)    Oral Nutrition/Hydration Goal 1, SLP  Pt will tolerate least restrictive diet with no overt s/s of aspiration.  -MB      Time Frame (Oral Nutrition/Hydration Goal 1, SLP)  by discharge  -MB      Barriers (Oral Nutrition/Hydration Goal 1, SLP)  Cognition  -MB      Progress/Outcomes (Oral Nutrition/Hydration Goal 1, SLP)  continuing progress toward goal  -MB         Pharyngeal Strengthening Exercise Goal 1 (SLP)    Activity (Pharyngeal Strengthening Goal 1, SLP)  increase superior movement of the hyolaryngeal complex;increase anterior movement of the hyolaryngeal complex;increase squeeze/positive pressure generation  -MB      Increase Superior Movement of the Hyolaryngeal Complex  Mendelsohn NMES  -MB      Increase Anterior Movement of the Hyolaryngeal Complex  shaker  -MB      Increase Squeeze/Positive Pressure Generation  hard effortful swallow;marsha  -MB      [place]/Accuracy (Pharyngeal Strengthening Goal 1, SLP)  with minimal cues (75-90% accuracy)  -MB      Time Frame (Pharyngeal Strengthening Goal 1, SLP)  short term goal (STG);by discharge  -MB      Barriers (Pharyngeal Strengthening Goal 1, SLP)  Cognition  -MB      Progress/Outcomes (Pharyngeal Strengthening Goal 1, SLP)  continuing progress toward goal  -MB        User Key  (r) = Recorded By, (t) = Taken By, (c) = Cosigned By    Initials Name Provider Type Discipline    Julia Saenz CCC-SLP Speech and Language Pathologist SLP        Occupational Therapy Education     Title: PT OT SLP Therapies (In Progress)     Topic: Occupational Therapy (Done)     Point: ADL training (Done)     Description: Instruct learner(s) on proper safety adaptation and remediation techniques during self care or transfers.   Instruct in proper use of assistive devices.     Learning Progress Summary           Patient Acceptance, E, NL,VU by OREN at 4/8/2019  1:41 PM    Comment:  Pt confused; difficulty with memory; re education required; educated on OT POC, ther ex, need for OOB activity                               User Key     Initials Effective Dates Name Provider Type Discipline    OREN 10/15/18 -  Charo Petit COTA/L Occupational Therapy Assistant OT                OT Recommendation and Plan  Outcome Summary/Treatment Plan (OT)  Daily Summary of Progress (OT): progress toward functional goals is gradual  Anticipated Discharge Disposition (OT): skilled nursing facility  Daily Summary of Progress (OT): progress toward functional goals is gradual  Plan of Care Review  Plan of Care Reviewed With: patient  Plan of Care Reviewed With: patient  Outcome Summary: OT txt completed. Pt appears fatigued, reclined back in chair upon entering room. Stands x2 to work on standing balance activity, modA for stand and min-modA for standing balance for ~10 seconds. Pt unable to tolerate standing and refuses further activity d/t fatigue. Cont OT POC. Rec d/c to SNF.  Outcome Measures     Row Name 04/09/19 1500 04/09/19 1327 04/08/19 1308       How much help from another person do you currently need...    Turning from your back to your side while in flat bed without using bedrails?  --  2  -AF  --    Moving from lying on back to sitting on the side of a flat bed without bedrails?  --  2  -AF  --    Moving to and from a bed to a chair (including a wheelchair)?  --  2  -AF  --    Standing up from a chair using your arms (e.g., wheelchair, bedside chair)?  --  2  -AF  --    Climbing 3-5 steps with a railing?  --  1  -AF  --    To walk in hospital room?  --  1  -AF  --    AM-PAC 6 Clicks Score  --  10  -AF  --       How much help from another is currently needed...    Putting on and taking off regular lower body clothing?  2  -MW  --  2  -MM    Bathing (including washing, rinsing, and drying)  2   -MW  --  2  -MM    Toileting (which includes using toilet bed pan or urinal)  2  -MW  --  2  -MM    Putting on and taking off regular upper body clothing  2  -MW  --  2  -MM    Taking care of personal grooming (such as brushing teeth)  2  -MW  --  2  -MM    Eating meals  3  -MW  --  3  -MM    Score  13  -MW  --  13  -MM       Functional Assessment    Outcome Measure Options  AM-PAC 6 Clicks Daily Activity (OT)  -MW  AM-PAC 6 Clicks Basic Mobility (PT)  -AF  --      User Key  (r) = Recorded By, (t) = Taken By, (c) = Cosigned By    Initials Name Provider Type    MW Lay Florentino, OTR/L Occupational Therapist    MM Charo Petit COTA/L Occupational Therapy Assistant    Sia Traore PTA Physical Therapy Assistant           Time Calculation:   Time Calculation- OT     Row Name 04/09/19 1547             Time Calculation- OT    OT Start Time  1530  -MW      OT Stop Time  1544  -MW      OT Time Calculation (min)  14 min  -MW      Total Timed Code Minutes- OT  14 minute(s)  -MW      OT Received On  04/09/19  -        User Key  (r) = Recorded By, (t) = Taken By, (c) = Cosigned By    Initials Name Provider Type     Lay Florentino OTR/L Occupational Therapist        Therapy Charges for Today     Code Description Service Date Service Provider Modifiers Qty    [id number]  OT THER PROC EA 15 MIN 4/9/2019 Lay Florentino OTR/L GO, KX 1          OT G-codes  OT Professional Judgement Used?: Yes  OT Functional Scales Options: AM-PAC 6 Clicks Daily Activity (OT)  Score: 13  Functional Limitation: Self care  Self Care Current Status (): At least 60 percent but less than 80 percent impaired, limited or restricted  Self Care Goal Status (): At least 40 percent but less than 60 percent impaired, limited or restricted    ASTRID Moreira/ZOE  4/9/2019

## 2019-04-09 NOTE — PLAN OF CARE
Problem: Patient Care Overview  Goal: Plan of Care Review  Outcome: Ongoing (interventions implemented as appropriate)   04/09/19 1815   Coping/Psychosocial   Plan of Care Reviewed With patient   OTHER   Outcome Summary Pt. required miniA to roll L and modA to trans SL L to sit EOB. Pt. stated he could not sit up and put forth little effort. He c/o of low back pain and agreed to sit in chair for a while. Pt. required modA to stand from EOB and to hold him upright while taking short steps to chair. He required VCs to take steps w/ each LE and Sreekanth to sit in chair safely. Once safely in chair, pt. performed ankle pumps and LAQ x 10 before refusing to do more and started c/o of thirst and how much he hates thickened liquids. Pt. will benefit from increased strengthening and activity.

## 2019-04-09 NOTE — PROGRESS NOTES
Continued Stay Note   Joanne     Patient Name: Vitaly Pantoja  MRN: [id number]  Today's Date: 4/9/2019    Admit Date: 4/1/2019    Discharge Plan     Row Name 04/09/19 1308       Plan    Plan  MD feels pt can make his own decisions about SNF/medical treatment.  He stated in the future pt's PCP might want to look into writing a letter for guardianship.  Pt is in agreement for SNF.  Pt lives in [place] so will try to keep him close to this area.  Referral has been made to [facility] in [place].  SW will follow for bed offer.  CECILIA Lucas.     Patient/Family in Agreement with Plan  yes        Discharge Codes    No documentation.             CECILIA Chan

## 2019-04-09 NOTE — PLAN OF CARE
Problem: Patient Care Overview  Goal: Plan of Care Review  Outcome: Ongoing (interventions implemented as appropriate)   04/09/19 0648   Coping/Psychosocial   Plan of Care Reviewed With patient   Plan of Care Review   Progress no change   OTHER   Outcome Summary VSS. No c/o pain. IV antibiotics continue. Wound care to bilateral feet completed this shift. Pt. tolerated well. Contact and droplet precautions continue. Pt. tolerated meds crushed in pudding with no s/s aspiration noted. Safety maintained. Continue to monitor.     Goal: Individualization and Mutuality  Outcome: Ongoing (interventions implemented as appropriate)   04/09/19 0648   Individualization   Patient Specific Goals (Include Timeframe) Improved Infection.   Patient Specific Interventions IV antibiotics continue. Droplet and contact isolation continue.     Goal: Interprofessional Rounds/Family Conf  Outcome: Ongoing (interventions implemented as appropriate)   04/09/19 0648   Interdisciplinary Rounds/Family Conf   Participants patient;nursing       Problem: Skin Injury Risk (Adult)  Goal: Skin Health and Integrity  Outcome: Ongoing (interventions implemented as appropriate)   04/09/19 0648   Skin Injury Risk (Adult)   Skin Health and Integrity making progress toward outcome       Problem: Fall Risk (Adult)  Goal: Absence of Fall  Outcome: Ongoing (interventions implemented as appropriate)   04/09/19 0648   Fall Risk (Adult)   Absence of Fall making progress toward outcome       Problem: Sepsis/Septic Shock (Adult)  Goal: Signs and Symptoms of Listed Potential Problems Will be Absent, Minimized or Managed (Sepsis/Septic Shock)  Outcome: Ongoing (interventions implemented as appropriate)   04/09/19 0648   Goal/Outcome Evaluation   Problems Assessed (Sepsis) all   Problems Present (Sepsis) situational response       Problem: Nutrition, Imbalanced: Inadequate Oral Intake (Adult)  Goal: Improved Oral Intake  Outcome: Ongoing (interventions implemented as  appropriate)   04/09/19 0648   Nutrition, Imbalanced: Inadequate Oral Intake (Adult)   Improved Oral Intake making progress toward outcome     Goal: Prevent Further Weight Loss  Outcome: Ongoing (interventions implemented as appropriate)   04/09/19 0648   Nutrition, Imbalanced: Inadequate Oral Intake (Adult)   Prevent Further Weight Loss making progress toward outcome       Problem: Dysphagia (Adult)  Goal: Compensatory Techniques to Improve Safety/Function with Swallowing  Outcome: Ongoing (interventions implemented as appropriate)   04/09/19 0648   Dysphagia (Adult)   Compensatory Techniques to Improve Safety/Function with Swallowing making progress toward outcome

## 2019-04-09 NOTE — PROGRESS NOTES
[facility] Medicine Services  INPATIENT PROGRESS NOTE    Patient Name: Vitaly Pantoja  Date of Admission: 4/1/2019  Today's Date: 04/09/19  Length of Stay: 8  Primary Care Physician: mEerson Sandhu MD    Subjective   Chief Complaint: diarrhea  HPI     Patient was seen and examined at bedside.  Patient had a very large bowel movement in the bed, that leaked around his depends.  Patient is requesting nursing assistants to help clean up.  Had a long discussion with the patient regarding his care, he was able to understand and follow conversation, and indicated that he would like to go to a nursing facility to complete his antibiotics instead of staying here.  He understood that he would need antibiotics for an additional 6 days.  Patient has been afebrile for the last 24 hours.  Vancomycin trough has been normal.  Patient is tolerating p.o. intake.        Review of Systems   Constitutional: Negative for activity change, appetite change, chills, diaphoresis, fatigue and fever.   Respiratory: Negative for cough and shortness of breath.    Cardiovascular: Negative for chest pain and palpitations.   Gastrointestinal: Positive for diarrhea. Negative for abdominal distention, abdominal pain, constipation, nausea and vomiting.        All pertinent negatives and positives are as above. All other systems have been reviewed and are negative unless otherwise stated.     Objective    Temp:  [97.9 °F (36.6 °C)-99.4 °F (37.4 °C)] 99.4 °F (37.4 °C)  Heart Rate:  [80-93] 87  Resp:  [16-22] 16  BP: (118-140)/(68-75) 138/75  Physical Exam  Constitutional: No distress.   HENT:   Head: Normocephalic and atraumatic.   Eyes: Conjunctivae are normal. No scleral icterus.   Neck: No JVD present.   Cardiovascular: Normal rate and regular rhythm. Exam reveals no gallop and no friction rub.   No murmur heard.  Pulmonary/Chest: Effort normal and breath sounds normal. No respiratory distress. He has no  wheezes. He has no rales.   Abdominal: Soft. Bowel sounds are normal. He exhibits no distension and no mass. There is tenderness. There is no rebound and no guarding. No hernia.   Musculoskeletal: He exhibits no edema.   Neurological: He is alert. He is alert and oriented x 3  Skin: Skin is warm and dry. He is not diaphoretic. No erythema.   Psychiatric: He has a normal mood and affect. His behavior is normal.   Nursing note and vitals reviewed.          Results Review:  I have reviewed the labs, radiology results, and diagnostic studies.    Laboratory Data:   Results from last 7 days   Lab Units 04/09/19  0619 04/08/19  0410 04/07/19  0254   WBC 10*3/mm3 11.28* 13.79* 14.33*   HEMOGLOBIN g/dL 10.1* 10.6* 10.6*   HEMATOCRIT % 31.0* 32.7* 32.5*   PLATELETS 10*3/mm3 342 286 246        Results from last 7 days   Lab Units 04/09/19  0619 04/07/19  0254 04/06/19  0410  04/05/19  0740 04/03/19  0258   SODIUM mmol/L 135 138 140   < > 144 140   POTASSIUM mmol/L 3.4* 3.4* 3.2*   < > 2.7* 3.5   CHLORIDE mmol/L 96* 96* 98   < > 101 101   CO2 mmol/L 28.0 36.0* 31.0   < > 34.0* 27.0   BUN mg/dL 8 11 13   < > 19 49*   CREATININE mg/dL 0.66 0.65 0.56   < > 0.56 1.30   CALCIUM mg/dL 7.2* 7.2* 7.4*   < > 7.7* 7.0*   BILIRUBIN mg/dL 0.6  --   --   --  0.8 0.6   ALK PHOS U/L 91  --   --   --  121* 168*   ALT (SGPT) U/L <15  --   --   --  39 56*   AST (SGOT) U/L 20  --   --   --  27 42   GLUCOSE mg/dL 145* 168* 203*   < > 177* 402*    < > = values in this interval not displayed.       Culture Data:   Blood Culture   Date Value Ref Range Status   04/07/2019 No growth at 24 hours  Preliminary   04/07/2019 No growth at 24 hours  Preliminary   04/03/2019 No growth at 5 days  Final       Radiology Data:   Imaging Results (last 24 hours)     ** No results found for the last 24 hours. **          I have reviewed the patient's current medications.     Assessment/Plan     Active Hospital Problems    Diagnosis   • Dysphagia   • MRSA pneumonia  (CMS/[facility]ly [facility])   • Cardiac arrest (CMS/[facility]ly [facility]), PEA    • Type 2 diabetes mellitus with complication (CMS/[facility]ly [facility]), hyperglycemia and peripheral neuropathy    • Beta hemolytic Streptococcus culture positive, bacteremia at OSH    • Acute hypoxemic respiratory failure (CMS/[facility])   • Septic shock        Plan:  1.  Continue IV Vanco - day 8/14   2.  Surveillance blood cultures from 4/1 and 4/3 negative  3.  Procalcitonin improved  4.  C diff pending  5.  Magic mouthwash and Mycelex cande for oral lesions  6.  PO Amio and Metoprolol; HR well controlled  7.  ST recommendations reviewed; currently appears to be doing well with trial of pureed diet with honey thick liquids; he will need assistance with ALL feeding. 8.  Wound care  9.  PO Eliquis for anticoagulation given [id number] score  10. Continue lisinopril   11.  SSI coverage; PO Metformin added; A1c is 9.1  12.  Weaned down to room air  13.  Condom catheter in place  15.  Physical therapy; increase activity as tolerated; currently OOB to chair  16.  Dispo planning: patient was at [facility] prior to this hospitalization will need SNF for duration of antibiotics.   17.  PICC consult    Vancomycin 1,250 mg BID until 4/15              Discharge Planning: I expect the patient to be discharged to SNF in 1-2 days.      Jadon Burnett MD   04/09/19   5:44 PM

## 2019-04-09 NOTE — DISCHARGE PLACEMENT REQUEST
"From:  Marianela Sheridan, BSW  [phone]    Vitaly Curran (59 y.o. Male)     Date of Birth Social Security Number Address Home Phone MRN    1960  [facility]  [address] [place] [address] [phone] [id number]    Buddhism Marital Status          Scientologist Single       Admission Date Admission Type Admitting Provider Attending Provider Department, Room/Bed    4/1/19 Urgent Jadon Burnett MD Fleming, John Eric, MD [facility] 4B, 401/1    Discharge Date Discharge Disposition Discharge Destination                       Attending Provider:  Jadon Burnett MD    Allergies:  Compazine [Prochlorperazine Edisylate], Hydrocodone, Oxycodone, Relafen [Nabumetone], Thorazine [Chlorpromazine]    Isolation:  Contact Drop   Infection:  MRSA (04/04/19)   Code Status:  CPR    Ht:  165.1 cm (65\")   Wt:  78.2 kg (172 lb 6.4 oz)    Admission Cmt:  None   Principal Problem:  None                Active Insurance as of 4/1/2019     Primary Coverage     Payor Plan Insurance Group Employer/Plan Group    MEDICARE MEDICARE A & B      Payor Plan Address Payor Plan Phone Number Payor Plan Fax Number Effective Dates    [address] [phone]  9/1/1997 - None Entered    [facility] [id number]       Subscriber Name Subscriber Birth Date Member ID       VITALY CURRAN 1960 [id number]           Secondary Coverage     Payor Plan Insurance Group Employer/Plan Group    [place]      Payor Plan Address Payor Plan Phone Number Payor Plan Fax Number Effective Dates    [address] [address]   1/1/2014 - None Entered    [place] [address]       Subscriber Name Subscriber Birth Date Member ID       VITALY CURRAN 1960 [id number]                 Emergency Contacts      (Rel.) Home Phone Work Phone Mobile Phone    Jin Curran (Other) [phone] -- --    Catrachito Curran (Brother) -- -- [phone]               History & Physical    "   Jadon Burnett MD at 4/1/2019  5:15 PM              [facility] Medicine Services  HISTORY AND PHYSICAL    Date of Admission: 4/1/2019  Primary Care Physician: Emerson Sandhu MD    Subjective     Chief Complaint: shortness of breath, fall    History of Present Illness    Mr. Pantoja is a 60 yo M presenting as a transfer from OSH.  History obtained from record and discussion with OSH.  Patient was unable to participate in H&P significantly as was very lethargic.Patient presented with shortness of breath.  Patient was found to be hypotensive and had a pneumonia.  Patient went into atrial fibrillation with RVR.    At OSH patient was given ceftriaxone 1 gm and azithromycin 500 mg.  Patient was started on cardizem gtt for his atrial fibrillation and was also given 1 mg/kg lovenorx.  Upon presentation there, T 100.6 and 90/53.     Per OSH patient present the patient was found on the floor in his room while checking on him and found on the floor next to his bed.      Patient was given 30 cc/kg bolus at OSH      Review of Systems   Unable to perform ROS: Patient unresponsive        Otherwise complete ROS reviewed and negative except as mentioned in the HPI.    Past Medical History:   Past Medical History:   Diagnosis Date   • Back pain     current back pain per pt   • Chronic rheumatic arthritis (CMS/HCC)    • Diabetes mellitus (CMS/HCC)    • Hip dysplasia, congenital      Past Surgical History:  Past Surgical History:   Procedure Laterality Date   • APPENDECTOMY     • JOINT REPLACEMENT Bilateral 2015    TKA   • TONSILLECTOMY       Social History:      Family History: Alcoholism, hypertension    Allergies:  No Known Allergies  Medications:  Prior to Admission medications    Medication Sig Start Date End Date Taking? Authorizing Provider   acetaminophen-codeine (TYLENOL with CODEINE #4) 300-60 MG per tablet Take 1 tablet by mouth Every 4 (Four) Hours As Needed for Moderate Pain .     Provider, MD Maxwell   cyclobenzaprine (FLEXERIL) 10 MG tablet Take 1 tablet by mouth 3 (Three) Times a Day As Needed for Muscle Spasms. 10/10/18   Victorina Hanson APRN   diazePAM (VALIUM) 5 MG tablet Take 5 mg by mouth Every 12 (Twelve) Hours As Needed for Anxiety.    Provider, MD Maxwell   lisinopril (PRINIVIL,ZESTRIL) 40 MG tablet Take 40 mg by mouth Daily.    ProviderMaxwell MD   MethylPREDNISolone (MEDROL, PRIYA,) 4 MG tablet Take as directed on package instructions. 10/10/18   Victorina Hanson APRN     Objective     Vital Signs: BP 91/73   Pulse 109   Wt 79.5 kg (175 lb 6 oz)   SpO2 93%   BMI 29.18 kg/m²    Physical Exam   Constitutional: He is oriented to person, place, and time. He appears lethargic. He appears distressed.   HENT:   Head: Normocephalic and atraumatic.   Eyes: Conjunctivae are normal. No scleral icterus.   Neck: Neck supple. JVD present.   Cardiovascular: Regular rhythm, normal heart sounds and intact distal pulses. Exam reveals no friction rub.   No murmur heard.  tachycardia   Pulmonary/Chest: He has wheezes. He has rales.   Rhonchi   Abdominal: Soft. Bowel sounds are normal. He exhibits no distension. There is no tenderness. There is no guarding.   Musculoskeletal: He exhibits no edema.   Neurological: He is oriented to person, place, and time. He appears lethargic.   Skin: Skin is warm. No rash noted. He is diaphoretic. No erythema.   Psychiatric: He has a normal mood and affect. His behavior is normal.   Nursing note and vitals reviewed.          Results Reviewed:  Lab Results (last 24 hours)     Procedure Component Value Units Date/Time    Respiratory Culture - Sputum, Cough [[id number]] Collected:  04/01/19 1745    Specimen:  Sputum from Cough Updated:  04/01/19 1753    TSH [[id number]]  (Normal) Collected:  04/01/19 1625    Specimen:  Blood Updated:  04/01/19 1733     TSH 0.836 mIU/mL     Procalcitonin [[id number]]  (Abnormal) Collected:  04/01/19 1625     Specimen:  Blood Updated:  04/01/19 1720     Procalcitonin 8.04 ng/mL     Narrative:       SIRS, sepsis, severe sepsis, and septic shock are categorized according to the criteria of the consensus conference of the American College of Chest Physicians/Society of Critical Care Medicine.    PCT < 0.5 ng/mL     Systemic infection (sepsis) is not likely.    PCT >0.5 and < 2.0 ng/mL Systemic infection (sepsis) is possible, but other conditions are known to elevate PCT as well.    PCT > 2.0 ng/mL     Systemic infection (sepsis) is likely, unless other causes are known.      PCT > 10.0 ng/mL    Important systemic inflammatory response, almost exclusively due to severe bacterial sepsis or septic shock.    PCT values of < 0.5 ng/mL do not exclude an infection, because localized infections (without systemic signs) may be associated with such low concentrations, or a systemic infection in its initial stages (<6 hours).  Increased PCT can occur without infection.  PCT concentrations between 0.5 and 2.0 ng/mL should be interpreted taking into account the patients history.  It is recommended to retest PCT within 6-24 hours if any concentrations < 2.0 ng/mL are obtained.    Manual Differential [[id number]]  (Abnormal) Collected:  04/01/19 1625    Specimen:  Blood Updated:  04/01/19 1716     Neutrophil % 96.0 %      Lymphocyte % 1.0 %      Monocyte % 3.0 %      Neutrophils Absolute 25.11 10*3/mm3      Lymphocytes Absolute 0.26 10*3/mm3      Monocytes Absolute 0.78 10*3/mm3      Anisocytosis Mod/2+     Microcytes Slight/1+     Poikilocytes Slight/1+     Polychromasia Mod/2+     WBC Morphology Normal     Giant Platelets Slight/1+    CBC Auto Differential [[id number]]  (Abnormal) Collected:  04/01/19 1625    Specimen:  Blood Updated:  04/01/19 1716     WBC 26.16 10*3/mm3      RBC 4.09 10*6/mm3      Hemoglobin 10.1 g/dL      Hematocrit 30.1 %      MCV 73.6 fL      MCH 24.7 pg      MCHC 33.6 g/dL      RDW 15.2 %      RDW-SD 40.1 fl       MPV 11.3 fL      Platelets 195 10*3/mm3     Hemoglobin A1c [[id number]] Collected:  04/01/19 1625    Specimen:  Blood Updated:  04/01/19 1716     Hemoglobin A1C 9.1 %     Narrative:       Less than 6.0           Non-Diabetic Range  6.0-7.0                 ADA Therapeutic Target  Greater than 7.0        Action Suggested    Troponin [[id number]]  (Normal) Collected:  04/01/19 1625    Specimen:  Blood Updated:  04/01/19 1713     Troponin I 0.012 ng/mL     Lactic Acid, Plasma [[id number]]  (Abnormal) Collected:  04/01/19 1625    Specimen:  Blood Updated:  04/01/19 1712     Lactate 2.3 mmol/L     Lactic Acid, Reflex Timer (This will reflex a repeat order 3-3:15 hours after ordered.) [[id number]] Collected:  04/01/19 1625    Specimen:  Blood Updated:  04/01/19 1704    Comprehensive Metabolic Panel [[id number]]  (Abnormal) Collected:  04/01/19 1625    Specimen:  Blood Updated:  04/01/19 1702     Glucose 128 mg/dL      BUN 59 mg/dL      Creatinine 2.32 mg/dL      Sodium 129 mmol/L      Potassium 5.2 mmol/L      Chloride 97 mmol/L      CO2 20.0 mmol/L      Calcium 7.2 mg/dL      Total Protein 5.7 g/dL      Albumin 2.90 g/dL      ALT (SGPT) 26 U/L      AST (SGOT) 43 U/L      Alkaline Phosphatase 149 U/L      Total Bilirubin 1.1 mg/dL      eGFR Non African Amer 29 mL/min/1.73      Globulin 2.8 gm/dL      A/G Ratio 1.0 g/dL      BUN/Creatinine Ratio 25.4     Anion Gap 12.0 mmol/L     Narrative:       GFR Normal >60  Chronic Kidney Disease <60  Kidney Failure <15    Phosphorus [[id number]]  (Abnormal) Collected:  04/01/19 1625    Specimen:  Blood Updated:  04/01/19 1702     Phosphorus 4.7 mg/dL     Magnesium [[id number]]  (Normal) Collected:  04/01/19 1625    Specimen:  Blood Updated:  04/01/19 1702     Magnesium 2.2 mg/dL     Blood Culture - Blood, Hand, Right [[id number]] Collected:  04/01/19 1625    Specimen:  Blood from Hand, Right Updated:  04/01/19 1649    Blood Culture - Blood, Hand, Left [[id number]] Collected:  04/01/19 1626     Specimen:  Blood from Hand, Left Updated:  04/01/19 1649    Blood Gas, Arterial [[id number]]  (Abnormal) Collected:  04/01/19 1628    Specimen:  Arterial Blood Updated:  04/01/19 1632     Site Left Femoral     Shawn's Test N/A     pH, Arterial 7.291 pH units      Comment: 84 Value below reference range        pCO2, Arterial 41.6 mm Hg      pO2, Arterial 72.7 mm Hg      Comment: 84 Value below reference range        HCO3, Arterial 20.0 mmol/L      Base Excess, Arterial -6.2 mmol/L      Comment: 84 Value below reference range        O2 Saturation, Arterial 93.7 %      Comment: 84 Value below reference range        Temperature 37.0 C      Barometric Pressure for Blood Gas 756 mmHg      Modality Nasal Cannula     Flow Rate 4.0 lpm      Ventilator Mode NA     Collected by [id number]     Comment: Meter: [id number]     :  [id number]           Imaging Results (last 24 hours)     Procedure Component Value Units Date/Time    XR Abdomen KUB [[id number]] Collected:  04/01/19 1750     Updated:  04/01/19 1754    Narrative:       EXAMINATION: KUB radiograph 04/01/2019     HISTORY: NG tube placement     FINDINGS: KUB radiograph reveals an NG tube has been successfully  advanced into the stomach with the tip in the distal body/antrum. The  tube is well-positioned and ready for use.  This report was finalized on 04/01/2019 17:50 by Dr. Jorje Talavera MD.    XR Chest 1 View [[id number]] Collected:  04/01/19 1749     Updated:  04/01/19 1753    Narrative:       EXAMINATION: Chest one view 04/01/2019     HISTORY: Intubation postarrest     FINDINGS: Today's exam is compared to previous study of earlier today.  There is worsening pulmonary venous hypertension with developing  interstitial and alveolar perihilar pulmonary edema. A small left  effusion is suspected. There is no pneumothorax. Endotracheal tube has  been placed with the tip approximately 1.5 cm above the aki. A right  IJ deep line remains in place with the tip in  the right atrium.       Impression:       1.. Endotracheal tube placed with the tip approximately 1.5 cm above the  aki. A right IJ deep line remains in place.  2. Worsening pulmonary venous hypertension with developing perihilar  interstitial and alveolar pulmonary edema.  This report was finalized on 04/01/2019 17:50 by Dr. Jorje Talavera MD.    XR Chest 1 View [[id number]] Collected:  04/01/19 1657     Updated:  04/01/19 1706    Narrative:       XR CHEST 1 VW- 4/1/2019 4:56 PM CDT     HISTORY: central line tube placement       COMPARISON: Chest x-ray dated 10/10/2018.     FINDINGS:   There are 2 areas of consolidation in the left lung base with partial  silhouetting of the left hemidiaphragm. Lungs are otherwise clear. No  pleural effusion. Heart is normal in size. Pulmonary vasculature are  unremarkable. There is a right IJ central line with tip projecting deep  within the right atrium.     The osseous structures and surrounding soft tissues demonstrate no acute  abnormality.       Impression:       1. Right IJ central line projects deep within the right atrium, possibly  near the valve. Consider withdrawing slightly.   2. Areas of consolidation in the left base may reflect pneumonia.        This report was finalized on 04/01/2019 17:03 by Dr Pop Munguia, .        I have personally reviewed and interpreted the radiology studies and ECG obtained at time of admission.     Assessment / Plan     Assessment:   Active Hospital Problems    Diagnosis   • Sepsis (CMS/[facility])     Assessment:  1.  Septic shock requiring vasopressors due to pneumonia  2.  New onset atrial fibrillation with RVR  3.  CARLOS due to sepsis  4.  Hematuria  5.  Anion gap metabolic acidosis due to lactic acidosis    Plan:   1.  IVF  2.  Levophed, vasopressin  3.  Duonebs  4.  Vanc, zosyn, merropenem  5.  BC x 2   6.  Respiratory culture  7.  Flu swab  8.  IVF  9.  Central line placed for CVP, access, and vasopressors     Approximately 45minutes  of critical care time were spent managing the patient exclusive of billable procedures.       Labs personally reviewed: Shows elevated creatinine and leukocytosis.  Otherwise brnign.    Radiology/Diagnostic imaging personally reviewed: CXR per my read shows bilateral infitrates R>L    Outside hospital records reviewed and summarized:  Patient received azithromycin and ceftriaxone there.  Vital signs reviewed.  Patient had mild leukocytosis, compensated ABG          Code Status: Presumed full     I discussed the patient's findings and my recommendations with the patient and bedside RNs    Estimated length of stay ?    Jadon Burnett MD   04/01/19   7:15 PM            Electronically signed by Jadon Burnett MD at 4/1/2019 11:03 PM       Prior to Admission Medications     Prescriptions Last Dose Informant Patient Reported? Taking?    acetaminophen-codeine (TYLENOL with CODEINE #4) 300-60 MG per tablet Past Month Nursing Home Yes Yes    Take 1 tablet by mouth Every 8 (Eight) Hours As Needed for Moderate Pain .    ARIPiprazole (ABILIFY) 5 MG tablet Past Week Nursing Home Yes Yes    Take 5 mg by mouth Daily.    busPIRone (BUSPAR) 10 MG tablet Past Week Nursing Home Yes Yes    Take 10 mg by mouth 2 (Two) Times a Day.    celecoxib (CeleBREX) 200 MG capsule Past Week Nursing Home Yes Yes    Take 200 mg by mouth 2 (Two) Times a Day.    cyclobenzaprine (FLEXERIL) 5 MG tablet Past Week Nursing Home Yes Yes    Take 5 mg by mouth 3 (Three) Times a Day As Needed for Muscle Spasms.    diazePAM (VALIUM) 5 MG tablet Past Week Nursing Home Yes Yes    Take 5 mg by mouth 2 (Two) Times a Day.    diphenoxylate-atropine (LOMOTIL) 2.5-0.025 MG per tablet Past Week Nursing Home Yes Yes    Take 1 tablet by mouth Every 8 (Eight) Hours As Needed for Diarrhea.    fluticasone (FLONASE) 50 MCG/ACT nasal spray Past Week Nursing Home Yes Yes    2 sprays into the nostril(s) as directed by provider Daily.    furosemide (LASIX) 20 MG tablet  Past Month Nursing Home Yes Yes    Take 20 mg by mouth Daily As Needed (leg swelling).    gabapentin (NEURONTIN) 300 MG capsule Past Week Nursing Home Yes Yes    Take 300 mg by mouth 2 (Two) Times a Day.    levothyroxine (SYNTHROID, LEVOTHROID) 25 MCG tablet Past Week Nursing Home Yes Yes    Take 25 mcg by mouth Daily.    lisinopril (PRINIVIL,ZESTRIL) 40 MG tablet Past Week Nursing Home Yes Yes    Take 40 mg by mouth Daily.    PARoxetine (PAXIL) 10 MG tablet Past Week Nursing Home Yes Yes    Take 10 mg by mouth Every Morning.    potassium chloride (K-DUR,KLOR-CON) 20 MEQ CR tablet Past Week Nursing Home Yes Yes    Take 20 mEq by mouth Daily.    promethazine (PHENERGAN) 12.5 MG tablet Past Month Nursing Home Yes Yes    Take 12.5 mg by mouth Every 6 (Six) Hours As Needed for Nausea or Vomiting.          Hospital Medications (active)       Dose Frequency Start End    acetaminophen (TYLENOL) tablet 650 mg 650 mg Every 6 Hours PRN 4/3/2019     Sig - Route: Take 2 tablets by mouth Every 6 (Six) Hours As Needed for Mild Pain . - Oral    amiodarone (PACERONE) tablet 200 mg 200 mg Every 12 Hours Scheduled 4/4/2019     Sig - Route: Take 1 tablet by mouth Every 12 (Twelve) Hours. - Oral    apixaban (ELIQUIS) tablet 5 mg 5 mg Every 12 Hours Scheduled 4/6/2019     Sig - Route: Take 1 tablet by mouth Every 12 (Twelve) Hours. - Oral    ARIPiprazole (ABILIFY) tablet 5 mg 5 mg Daily 4/5/2019     Sig - Route: Take 1 tablet by mouth Daily. - Oral    busPIRone (BUSPAR) tablet 10 mg 10 mg Every 12 Hours Scheduled 4/5/2019     Sig - Route: Take 1 tablet by mouth Every 12 (Twelve) Hours. - Oral    clotrimazole (MYCELEX) cande 10 mg 10 mg 5 Times Daily 4/7/2019 4/8/2019    Sig - Route: Take 1 tablet by mouth 5 (Five) Times a Day. - Oral    dextrose (D50W) 25 g/ 50mL Intravenous Solution 25 g 25 g Every 15 Minutes PRN 4/2/2019     Sig - Route: Infuse 50 mL into a venous catheter Every 15 (Fifteen) Minutes As Needed for Low Blood Sugar  (Blood Sugar Less Than 70). - Intravenous    dextrose (GLUTOSE) oral gel 15 g 15 g Every 15 Minutes PRN 4/2/2019     Sig - Route: Take 15 g by mouth Every 15 (Fifteen) Minutes As Needed for Low Blood Sugar (Blood sugar less than 70). - Oral    diazePAM (VALIUM) tablet 5 mg 5 mg Every 12 Hours PRN 4/5/2019 4/15/2019    Sig - Route: Take 1 tablet by mouth Every 12 (Twelve) Hours As Needed for Anxiety. - Oral    glucagon (human recombinant) (GLUCAGEN DIAGNOSTIC) injection 1 mg 1 mg As Needed 4/2/2019     Sig - Route: Inject 1 mg under the skin into the appropriate area as directed As Needed (Blood Glucose Less Than 70). - Subcutaneous    insulin lispro (humaLOG) injection 4-24 Units 4-24 Units 4 Times Daily With Meals & Nightly 4/8/2019     Sig - Route: Inject 4-24 Units under the skin into the appropriate area as directed 4 (Four) Times a Day With Meals & at Bedtime. - Subcutaneous    ipratropium (ATROVENT) nebulizer solution 0.5 mg 0.5 mg 4 Times Daily - RT 4/5/2019     Sig - Route: Take 2.5 mL by nebulization 4 (Four) Times a Day. - Nebulization    levothyroxine (SYNTHROID, LEVOTHROID) tablet 12.5 mcg 12.5 mcg Every Early Morning 4/5/2019     Sig - Route: Take 0.5 tablets by mouth Every Morning. - Oral    lidocaine-Maalox-diphenhydramine (MIRACLE MOUTHWASH) oral suspension 30 mL 30 mL 4 Times Daily PRN 4/7/2019     Sig - Route: Swish and spit 30 mL 4 (Four) Times a Day As Needed (oral pain). - Swish & Spit    lisinopril (PRINIVIL,ZESTRIL) tablet 20 mg 20 mg Every 24 Hours Scheduled 4/6/2019     Sig - Route: Take 1 tablet by mouth Daily. - Oral    metFORMIN (GLUCOPHAGE) tablet 500 mg 500 mg 2 Times Daily With Meals 4/6/2019     Sig - Route: Take 1 tablet by mouth 2 (Two) Times a Day With Meals. - Oral    metoprolol tartrate (LOPRESSOR) tablet 25 mg 25 mg Every 12 Hours Scheduled 4/5/2019     Sig - Route: Take 1 tablet by mouth Every 12 (Twelve) Hours. - Oral    ondansetron (ZOFRAN) injection 4 mg 4 mg Every 6 Hours  PRN 4/5/2019     Sig - Route: Infuse 2 mL into a venous catheter Every 6 (Six) Hours As Needed for Nausea or Vomiting. - Intravenous    PARoxetine (PAXIL) tablet 10 mg 10 mg Daily 4/5/2019     Sig - Route: Take 1 tablet by mouth Daily. - Oral    sodium chloride 0.9 % flush 3 mL 3 mL Every 12 Hours Scheduled 4/1/2019     Sig - Route: Infuse 3 mL into a venous catheter Every 12 (Twelve) Hours. - Intravenous    sodium chloride 0.9 % flush 3-10 mL 3-10 mL As Needed 4/1/2019     Sig - Route: Infuse 3-10 mL into a venous catheter As Needed for Line Care. - Intravenous    vancomycin 1250 mg/250 mL 0.9% NS IVPB ([facility]) 1,250 mg Every 12 Hours 4/9/2019 4/12/2019    Sig - Route: Infuse 250 mL into a venous catheter Every 12 (Twelve) Hours. - Intravenous    insulin lispro (humaLOG) injection 0-24 Units (Discontinued) 0-24 Units Every 6 Hours 4/3/2019 4/8/2019    Sig - Route: Inject 0-24 Units under the skin into the appropriate area as directed Every 6 (Six) Hours. - Subcutaneous    vancomycin (VANCOCIN) in iso-osmotic dextrose IVPB 1 g (premix) 200 mL (Discontinued) 1,000 mg Every 8 Hours 4/7/2019 4/8/2019    Sig - Route: Infuse 200 mL into a venous catheter Every 8 (Eight) Hours. - Intravenous    Reason for Discontinue: Reorder             Operative/Procedure Notes (most recent note)      Inder Huynh MD at 4/2/2019  8:15 PM      Pre-procedure Diagnoses    1. Poor venous access [I87.8]           Post-procedure Diagnoses    1. Poor venous access [I87.8]           Procedures    1. INSERT CENTRAL LINE AT BEDSIDE [PRO85 (Custom)]               Procedure insertion of central venous line.  Indication previous central line to long for access site was in right atrium at right AV junction.  Available equipment inappropriate to just pull back to much exposure so elected to change his line out for shorter line.  Consent: The procedure was performed in an emergent situation. Verbal consent not obtained. Written consent not  "obtained.  Patient identity confirmed: hospital-assigned identification number  Time out: Immediately prior to procedure a \"time out\" was called to verify the correct patient, procedure, equipment, support staff and site/side marked as required.  Indications: vascular access  Anesthesia: None   anesthesia:  Anesthesia: None    Sedation:  Patient sedated: Yes   Preparation: skin prepped with 2% chlorhexidine  Skin prep agent dried: skin prep agent completely dried prior to procedure  Sterile barriers: all five maximum sterile barriers used - cap, mask, sterile gown, sterile gloves, and large sterile sheet  Hand hygiene: hand hygiene performed prior to central venous catheter insertion  Location details: Right IJ  Patient position: trendelenburg  Oh 1 sutures were removed.  Area was cleaned thoroughly as well as catheter.  In sterile fashion new guidewire was placed through the old catheter.  It was subsequently withdrawn.  New line was placed via modified Seldinger procedure without difficulty and sutured into place.  All 3 ports aspirated and flushed without difficulty.  Sutured into place.  Chest x-ray confirmed good placement and good positioning of the line.  Catheter type: triple lumen  Ultrasound guidance: no  Number of attempts: 1  Successful placement: yes  Post-procedure: line sutured and dressing applied  Assessment: blood return through all ports  No complications  EBL Zero  CXR confirms placement and no pneumothorax      Inder Huynh MD  04/03/19  8:17 PM                        Electronically signed by Inder Huynh MD at 4/3/2019  8:21 PM          Physician Progress Notes (most recent note)      Jadon Burnett MD at 4/8/2019  4:10 PM              [facility] Medicine Services  INPATIENT PROGRESS NOTE    Patient Name: Vitaly Pantoja  Date of Admission: 4/1/2019  Today's Date: 04/08/19  Length of Stay: 7  Primary Care Physician: Emerson Sandhu, " MD    Subjective   Chief Complaint: Shortness of breath  HPI     Patient was seen and examined at bedside.  Patient is overall doing much better.  Indicates that he does not have any significant shortness of breath.  He does have some loose stools.  Patient has been febrile on multiple occasions over the past 24 hours.  He received blood cultures yesterday that are showing no growth today.  He remains on vancomycin, however troughs have been a little bit on the lower side.  Patient is requesting to go home soon.  Patient has had poor p.o. intake over the last couple of days.  He indicates that some of it is due to not being interested in the food.        Review of Systems   Constitutional: Positive for appetite change (poor ), chills and fever. Negative for activity change.   Respiratory: Negative for cough (resolved) and shortness of breath.    Cardiovascular: Negative for chest pain and palpitations.   Gastrointestinal: Negative for abdominal distention, abdominal pain, anal bleeding, constipation, diarrhea, nausea and vomiting.      All pertinent negatives and positives are as above. All other systems have been reviewed and are negative unless otherwise stated.     Objective    Temp:  [98.6 °F (37 °C)-101.2 °F (38.4 °C)] 99.2 °F (37.3 °C)  Heart Rate:  [75-95] 85  Resp:  [16-20] 16  BP: (120-143)/(66-74) 120/70  Physical Exam   Constitutional: No distress.   HENT:   Head: Normocephalic and atraumatic.   Eyes: Conjunctivae are normal. No scleral icterus.   Neck: No JVD present.   Cardiovascular: Normal rate and regular rhythm. Exam reveals no gallop and no friction rub.   No murmur heard.  Pulmonary/Chest: Effort normal and breath sounds normal. No respiratory distress. He has no wheezes. He has no rales.   Abdominal: Soft. Bowel sounds are normal. He exhibits no distension and no mass. There is tenderness. There is no rebound and no guarding. No hernia.   Musculoskeletal: He exhibits no edema.   Neurological: He  is alert.   Oriented to person and place, but not situation   Skin: Skin is warm and dry. He is not diaphoretic. No erythema.   Psychiatric: He has a normal mood and affect. His behavior is normal.   Nursing note and vitals reviewed.          Results Review:  I have reviewed the labs, radiology results, and diagnostic studies.    Laboratory Data:   Results from last 7 days   Lab Units 04/08/19  0410 04/07/19  0254 04/06/19  0410   WBC 10*3/mm3 13.79* 14.33* 12.20*   HEMOGLOBIN g/dL 10.6* 10.6* 11.1*   HEMATOCRIT % 32.7* 32.5* 34.6*   PLATELETS 10*3/mm3 286 246 258        Results from last 7 days   Lab Units 04/07/19  0254 04/06/19  0410 04/05/19  1550 04/05/19  0740 04/03/19  0258 04/02/19  0236   SODIUM mmol/L 138 140 140 144 140 135   POTASSIUM mmol/L 3.4* 3.2* 3.1* 2.7* 3.5 5.6*   CHLORIDE mmol/L 96* 98 97* 101 101 101   CO2 mmol/L 36.0* 31.0 34.0* 34.0* 27.0 15.0*   BUN mg/dL 11 13 17 19 49* 60*   CREATININE mg/dL 0.65 0.56 0.67 0.56 1.30 2.45*   CALCIUM mg/dL 7.2* 7.4* 7.8* 7.7* 7.0* 7.0*   BILIRUBIN mg/dL  --   --   --  0.8 0.6 1.2*   ALK PHOS U/L  --   --   --  121* 168* 223*   ALT (SGPT) U/L  --   --   --  39 56* 67*   AST (SGOT) U/L  --   --   --  27 42 158*   GLUCOSE mg/dL 168* 203* 235* 177* 402* 213*       Culture Data:   Blood Culture   Date Value Ref Range Status   04/07/2019 No growth at less than 24 hours  Preliminary   04/07/2019 No growth at less than 24 hours  Preliminary   04/03/2019 No growth at 5 days  Final   04/01/2019 No growth at 5 days  Final   04/01/2019 No growth at 5 days  Final     Urine Culture   Date Value Ref Range Status   04/01/2019 No growth at 2 days  Final     Respiratory Culture   Date Value Ref Range Status   04/01/2019 Heavy growth (4+) Normal Respiratory Danica  Final   04/01/2019 Moderate growth (3+) Staphylococcus aureus, MRSA (A)  Final     Comment:       Methicillin resistant Staphylococcus aureus, Patient may be an isolation risk.       Radiology Data:   Imaging Results  (last 24 hours)     ** No results found for the last 24 hours. **          I have reviewed the patient's current medications.     Assessment/Plan     Active Hospital Problems    Diagnosis   • Dysphagia   • MRSA pneumonia (CMS/[facility])   • Cardiac arrest (CMS/[facility]), PEA    • Type 2 diabetes mellitus with complication (CMS/[facility]), hyperglycemia and peripheral neuropathy    • Beta hemolytic Streptococcus culture positive, bacteremia at OSH    • Acute hypoxemic respiratory failure (CMS/[facility])   • Septic shock      Plan:  1.  Continue IV Vanco - day 7/14 o  2.  Surveillance blood cultures from 4/1 and 4/3 negative  3.   Procalcitonin improved  4.  C diff pending  5.  Magic mouthwash and Mycelex cande for oral lesions  6.  PO Amio and Metoprolol; HR well controlled  7.  ST recommendations reviewed; currently appears to be doing well with trial of pureed diet with honey thick liquids; he will need assistance with ALL feeding. 8.  Wound care  9.  PO Eliquis for anticoagulation given [id number] score  10. Continue lisinopril   11.  SSI coverage; PO Metformin added; A1c is 9.1  12.   Weaned down to room air  13.  Condom catheter in place  15.  Physical therapy; increase activity as tolerated; currently OOB to chair  16.  Dispo planning: patient was at [facility] prior to this hospitalization will need SNF for duration of antibiotics.   17.  PICC consult                    Discharge Planning: I expect the patient to be discharged to SNF in ? Days.      Jadon Burnett MD   04/08/19   4:10 PM    Electronically signed by Jadon Burnett MD at 4/8/2019  6:47 PM       Consult Notes (most recent note)     No notes of this type exist for this encounter.           Nutrition Notes (most recent note)      Francine Madsen at 4/6/2019  3:57 PM     Attestation signed by Alonzo Milligan MD at 4/8/2019  8:57 AM    I have reviewed the documentation above and agree.    Alonzo Milligan MD  04/08/19  8:57 AM                       Malnutrition Severity Assessment    Patient Name:  Vitaly Pantoja  YOB: 1960  MRN: [id number]  Admit Date:  4/1/2019    Patient meets criteria for : Moderate malnutrition    Comments:  If in agreement with malnutrition assessment, please attest documentation. Thanks.     Malnutrition Type: Chronic Illness Malnutrition     Malnutrition Type (last 8 hours)      Malnutrition Severity Assessment     Row Name 04/06/19 1544       Malnutrition Severity Assessment    Malnutrition Type  Chronic Illness Malnutrition    Row Name 04/06/19 1544       Physical Signs of Malnutrition (Chronic)    Muscle Wasting  Mild moderate muscle wasting: sunken temporalis, clavicle bone visible and squared shoulders, some depression of interosseous muscle    Fat Loss  Mild moderate fat wasting: sunken/dark orbital fat pad, mild depression of buccal fat pad, loose tissue to upper arm    Fluid Accumulation  Mild    Secondary Physical Signs  Present (comment) dry/flaky skin with decreased turgor; pressure injuries to L and R foot.     Row Name 04/06/19 1544       Energy Intake Status (Chronic)    Energy Intake  Mild (<75% / 5d)    Row Name 04/06/19 1544       Criteria Met (Must meet criteria for severity in at least 2 of these categories: M Wasting, Fat Loss, Fluid, Secondary Signs, Wt. Status, Intake)    Patient meets criteria for   Moderate malnutrition          Electronically signed by:  Francine Madsen RDN, LD  04/06/19 3:57 PM        Electronically signed by Alonzo Milligan MD at 4/8/2019  8:57 AM          Physical Therapy Notes (most recent note)      Marisol Cantu PTA at 4/8/2019 10:54 AM  Version 1 of 1         Problem: Patient Care Overview  Goal: Plan of Care Review  Outcome: Ongoing (interventions implemented as appropriate)   04/08/19 1053   Coping/Psychosocial   Plan of Care Reviewed With patient   OTHER   Outcome Summary Pt. requires min assist for supine to sit. Min assist to stand. mod assist to  "take a few small steps to bedside chair. Tolerated well.           Electronically signed by Marisol Cantu PTA at 2019 10:54 AM          Occupational Therapy Notes (most recent note)      Charo Petit COTA/L at 2019  1:48 PM          Acute Care - Occupational Therapy Treatment Note   Joanne     Patient Name: Vitaly Pantoja  : 1960  MRN: [id number]  Today's Date: 2019  Onset of Illness/Injury or Date of Surgery: 19  Date of Referral to OT: 19  Referring Physician: Dr. Milligan    Admit Date: 2019       ICD-10-CM ICD-9-CM   1. Shock, septic (CMS/HCC) A41.9 038.9    R65.21 785.52     995.92   2. Cardiac arrest (CMS/HCC) I46.9 427.5   3. Oropharyngeal dysphagia R13.12 787.22   4. Decreased activities of daily living (ADL) R68.89 780.99   5. Impaired mobility Z74.09 799.89     Patient Active Problem List   Diagnosis   • Sepsis (CMS/HCC)     Past Medical History:   Diagnosis Date   • Back pain     current back pain per pt   • Chronic rheumatic arthritis (CMS/HCC)    • Diabetes mellitus (CMS/HCC)    • Hip dysplasia, congenital      Past Surgical History:   Procedure Laterality Date   • APPENDECTOMY     • JOINT REPLACEMENT Bilateral     TKA   • TONSILLECTOMY         Therapy Treatment    Rehabilitation Treatment Summary     Row Name 19 1308 19 1023 19 0836       Treatment Time/Intention    Discipline  occupational therapy assistant  -MM  physical therapy assistant  -PARVEEN  speech language pathologist  -MB    Document Type  therapy note (daily note)  -MM  therapy note (daily note)  -PARVEEN  therapy note (daily note)  -MB    Subjective Information  complains of;pain with ther ex R shoulder   -MM2  complains of \"I don't feel good today\"  -JP2  complains of;fatigue  -MB    Mode of Treatment  --  --  speech-language pathology  -MB    Patient/Family Observations  --  --  No family present  -MB    Patient Effort  fair  -MM  --  fair  -MB    Existing " Precautions/Restrictions  fall  -MM  fall  -JP2  --    Treatment Considerations/Comments  confused; extra time needed for understanding; Per PTA from NSG pt developmentally delayed  -MM2  developmentally delayed  -JP2  --    Recorded by [MM] Charo Petit COTA/L 04/08/19 1333  [MM2] Charo Petit LEDBETTER/L 04/08/19 1340 [PARVEEN] Marisol Canut, PTA 04/08/19 1024  [JP2] Marisol Cantu, PTA 04/08/19 1052 [MB] Julia Nevarez, CCC-SLP 04/08/19 0932    Row Name 04/08/19 1308             Cognitive Assessment/Intervention- PT/OT    Affect/Mental Status (Cognitive)  confused  -MM      Orientation Status (Cognition)  oriented to;person;place  -MM      Follows Commands (Cognition)  WFL;follows one step commands;over 90% accuracy  -MM      Cognitive Function (Cognitive)  memory deficit  -MM      Memory Deficit (Cognitive)  mild deficit;short term memory  -MM      Personal Safety Interventions  fall prevention program maintained;gait belt;nonskid shoes/slippers when out of bed;supervised activity  -MM      Recorded by [MM] Charo Petit COTA/L 04/08/19 1333      Row Name 04/08/19 1308             Safety Issues, Functional Mobility    Impairments Affecting Function (Mobility)  balance;cognition;coordination;endurance/activity tolerance;motor planning;pain;postural/trunk control;shortness of breath;strength  -MM      Recorded by [MM] Charo Petit COTA/L 04/08/19 1333      Row Name 04/08/19 1308 04/08/19 1023          Bed Mobility Assessment/Treatment    Rolling Left [place] (Bed Mobility)  --  verbal cues;minimum assist (75% patient effort)  -PARVEEN     Supine-Sit [place] (Bed Mobility)  --  verbal cues;minimum assist (75% patient effort)  -PARVEEN     Comment (Bed Mobility)  declined EOB or OOB activiy; agrees to in bed activity only  -MM  --     Recorded by [MM] Charo Petit COTA/L 04/08/19 1333 [PARVEEN] Marisol Cantu, PTA 04/08/19 1052     Row Name 04/08/19 1308             Transfer  Assessment/Treatment    Comment (Transfers)  declined OOB activity   -MM      Recorded by [MM] Charo Petit COTA/L 04/08/19 1333      Row Name 04/08/19 1023             Bed-Chair Transfer    Bed-Chair [place] (Transfers)  verbal cues;moderate assist (50% patient effort) forward head posture  -PARVEEN      Recorded by [PARVEEN] Marisol Cantu, PTA 04/08/19 1052      Row Name 04/08/19 1023             Sit-Stand Transfer    Sit-Stand [place] (Transfers)  verbal cues;minimum assist (75% patient effort)  -PARVEEN      Recorded by [PARVEEN] Marisol Cantu, PTA 04/08/19 1052      Row Name 04/08/19 1023             Stand-Sit Transfer    Stand-Sit [place] (Transfers)  contact guard  -PARVEEN      Recorded by [PARVEEN] Marisol Cantu, [facility] 04/08/19 1052      Row Name 04/08/19 1308             ADL Assessment/Intervention    BADL Assessment/Intervention  grooming;feeding  -MM      Recorded by [MM] Charo Petit COTA/L 04/08/19 1344      Row Name 04/08/19 1308             Grooming Assessment/Training    [place] Level (Grooming)  wash face, hands;set up;verbal cues  -MM      Grooming Position  sitting up in bed;supported sitting  -MM      Comment (Grooming)  decreased initiation   -MM      Recorded by [MM] Charo Petit COTA/L 04/08/19 1344      Row Name 04/08/19 1308             Self-Feeding Assessment/Training    Comment (Feeding)  attempted to assist; pt refused to eat notified NSG  -MM      Recorded by [MM] Charo Petit COTA/L 04/08/19 1344      Row Name 04/08/19 1308             BADL Safety/Performance    Impairments, BADL Safety/Performance  balance;endurance/activity tolerance;strength;coordination;grasp/prehension  -MM      Skilled BADL Treatment/Intervention  BADL process/adaptation training  -MM      Recorded by [MM] Charo Petit COTA/L 04/08/19 1344      Row Name 04/08/19 1308             Motor Skills Assessment/Interventions    Additional Documentation  Therapeutic Exercise  (Group);Therapeutic Exercise Interventions (Group)  -MM      Recorded by [MM] Charo Petit COTA/L 04/08/19 1333      Row Name 04/08/19 1308 04/08/19 1023          Therapeutic Exercise    Lower Extremity (Therapeutic Exercise)  --  heel slides, bilateral;SAQ (short arc quad), bilateral  -PARVEEN     Lower Extremity Range of Motion (Therapeutic Exercise)  --  hip abduction/adduction, bilateral;hip internal/external rotation, bilateral;ankle dorsiflexion/plantar flexion, bilateral  -PARVEEN     Exercise Type (Therapeutic Exercise)  --  AROM (active range of motion)  -PARVEEN     Position (Therapeutic Exercise)  --  supine  -PARVEEN     Sets/Reps (Therapeutic Exercise)  --  15  -PARVEEN     Comment (Therapeutic Exercise)  AROM all planes BUEs x15 reps; used 2#wand for BUE bicep curls shoulder flex; pt reports pain RUE in shoulder with weight  -MM  --     Recorded by [MM] Charo Petit COTA/L 04/08/19 1333 [PARVEEN] Marisol Cantu, MICHELLE 04/08/19 1052     Row Name 04/08/19 1023             Static Sitting Balance    Level of [place] (Supported Sitting, Static Balance)  contact guard assist;minimal assist, 75% patient effort posterior lean at times  -PARVEEN      Recorded by [PARVEEN] Marisol Cantu PTA 04/08/19 1052      Row Name 04/08/19 1308 04/08/19 1023          Positioning and Restraints    Pre-Treatment Position  in bed  -MM  in bed  -PARVEEN     Post Treatment Position  bed  -MM  chair  -PARVEEN     In Bed  fowlers;call light within reach;encouraged to call for assist;exit alarm on;side rails up x2  -MM  --     In Chair  --  reclined;call light within reach;encouraged to call for assist;exit alarm on  -PARVEEN     Recorded by [MM] Charo Petit COTA/L 04/08/19 1333 [PARVEEN] Marisol Cantu, MICHELLE 04/08/19 1052     Row Name 04/08/19 1023             Pain Scale: Numbers Pre/Post-Treatment    Pain Scale: Numbers, Pretreatment  0/10 - no pain  -PARVEEN      Recorded by [PARVEEN] Marisol Cantu PTA 04/08/19 1052      Row Name 04/08/19 1308 04/08/19 0836           Pain Scale: FACES Pre/Post-Treatment    Pain: FACES Scale, Pretreatment  0-->no hurt  -MM  2-->hurts little bit  -MB     Pain: FACES Scale, Post-Treatment  0-->no hurt  -MM  --     Recorded by [MM] Charo Petit COTA/L 04/08/19 1333 [MB] Julia Nevarez CCC-SLP 04/08/19 0932     Row Name                Wound 04/01/19 1811 Left anterior foot pressure injury    Wound - Properties Group Date first assessed: 04/01/19 [AW] Time first assessed: 1811 [AW] Present On Admission : picture taken [AW] Side: Left [AW] Orientation: anterior [AW] Location: foot [AW] Type: pressure injury [AW] Stage, Pressure Injury: Stage 3 [AW] Recorded by:  [AW] Philly Palma RN 04/01/19 1812    Row Name                Wound 04/01/19 1817 Right anterior foot diabetic/neuropathic ulceration    Wound - Properties Group Date first assessed: 04/01/19 [AW] Time first assessed: 1817 [AW] Present On Admission : yes;picture taken [AW] Side: Right [AW] Orientation: anterior [AW] Location: foot [AW] Type: diabetic/neuropathic ulceration [AW2] Stage, Pressure Injury: -- [AW2] Recorded by:  [AW] Philly Palma RN 04/01/19 1817 [AW2] Philly Palma RN 04/02/19 1247    Row Name 04/08/19 1308             Plan of Care Review    Plan of Care Reviewed With  patient  -MM      Recorded by [MM] Charo Petit COTA/L 04/08/19 1333      Row Name 04/08/19 0836             Outcome Summary/Treatment Plan (SLP)    Daily Summary of Progress (SLP)  progress towards functional goals is fair  -MB      Barriers to Overall Progress (SLP)  Mouth sores  -MB      Plan for Continued Treatment (SLP)  Pureed diet and honey thick liquids started per MD. Continue to follow for swallow therapy  -MB      Anticipated Dischage Disposition  skilled nursing facility  -MB      Recorded by [MB] Julia Nevarez CCC-SLP 04/08/19 0932        User Key  (r) = Recorded By, (t) = Taken By, (c) = Cosigned By    Initials Name Effective Dates Discipline    SUSHIL Nevarez  Julia DAMON CCC-SLP 08/02/16 -  SLP    Marisol Figueredo, PTA 08/02/16 -  PT    Philly Molina, RN 08/02/16 -  Nurse    MM Charo Petit COTA/L 10/15/18 -  OT        Wound 04/01/19 1811 Left anterior foot pressure injury (Active)   Dressing Appearance dry;intact 4/8/2019  7:40 AM   Closure JULIO 4/7/2019  7:27 PM   Base dressing in place, unable to visualize 4/7/2019  7:27 PM   Care, Wound cleansed with;sterile normal saline 4/8/2019  5:00 AM   Dressing Care, Wound dressing changed 4/8/2019  5:00 AM       Wound 04/01/19 1817 Right anterior foot diabetic/neuropathic ulceration (Active)   Dressing Appearance dry;intact 4/8/2019  7:40 AM   Closure JULIO 4/7/2019  7:27 PM   Base dressing in place, unable to visualize 4/7/2019  7:27 PM   Care, Wound cleansed with;sterile normal saline 4/8/2019  5:00 AM   Dressing Care, Wound dressing changed 4/8/2019  5:00 AM     Rehab Goal Summary     Row Name 04/08/19 0836             Oral Nutrition/Hydration Goal 1 (SLP)    Oral Nutrition/Hydration Goal 1, SLP  Pt will tolerate least restrictive diet with no overt s/s of aspiration.  -MB      Time Frame (Oral Nutrition/Hydration Goal 1, SLP)  by discharge  -MB      Barriers (Oral Nutrition/Hydration Goal 1, SLP)  none  -MB      Progress/Outcomes (Oral Nutrition/Hydration Goal 1, SLP)  continuing progress toward goal  -MB         Pharyngeal Strengthening Exercise Goal 1 (SLP)    Activity (Pharyngeal Strengthening Goal 1, SLP)  increase superior movement of the hyolaryngeal complex;increase anterior movement of the hyolaryngeal complex;increase squeeze/positive pressure generation  -MB      Increase Superior Movement of the Hyolaryngeal Complex  Mendelsohn NMES  -MB      Increase Anterior Movement of the Hyolaryngeal Complex  shaker  -MB      Increase Squeeze/Positive Pressure Generation  hard effortful swallow;marsha  -MB      [place]/Accuracy (Pharyngeal Strengthening Goal 1, SLP)  with minimal cues (75-90% accuracy)  -MB       Time Frame (Pharyngeal Strengthening Goal 1, SLP)  short term goal (STG);by discharge  -MB      Barriers (Pharyngeal Strengthening Goal 1, SLP)  mouth sores  -MB      Progress/Outcomes (Pharyngeal Strengthening Goal 1, SLP)  continuing progress toward goal  -MB        User Key  (r) = Recorded By, (t) = Taken By, (c) = Cosigned By    Initials Name Provider Type Discipline    Julia Saenz, CCC-SLP Speech and Language Pathologist SLP        Occupational Therapy Education     Title: PT OT SLP Therapies (In Progress)     Topic: Occupational Therapy (Done)     Point: ADL training (Done)     Description: Instruct learner(s) on proper safety adaptation and remediation techniques during self care or transfers.   Instruct in proper use of assistive devices.    Learning Progress Summary           Patient Acceptance, E, NL,VU by OREN at 4/8/2019  1:41 PM    Comment:  Pt confused; difficulty with memory; re education required; educated on OT POC, ther ex, need for OOB activity                               User Key     Initials Effective Dates Name Provider Type Discipline    OREN 10/15/18 -  Charo Petit COTA/L Occupational Therapy Assistant OT                OT Recommendation and Plan     Plan of Care Review  Plan of Care Reviewed With: patient  Plan of Care Reviewed With: patient  Outcome Summary: Pt declined EOB or OOB activity. Washed face set up and VCs. Performed AROM BUEs all planes for increased endurance/strength for t/fs. Attempted 2# wand for ther ex pt reported pain R shoulder. Pt limited by cognition, understanding of task and short term memory. Frequently asked for a drink of water; attempted to assist pt with self feeding of lunch tray; pt refused Notified NSG. Continue OT POC. Recommend SNF.   Outcome Measures     Row Name 04/08/19 1308 04/05/19 1505 04/05/19 1405       How much help from another person do you currently need...    Turning from your back to your side while in flat bed without  using bedrails?  --  --  2  -AIME (r) GR (t) AIME (c)    Moving from lying on back to sitting on the side of a flat bed without bedrails?  --  --  2  -AIME (r) GR (t) AIME (c)    Moving to and from a bed to a chair (including a wheelchair)?  --  --  2  -AIME (r) GR (t) AIME (c)    Standing up from a chair using your arms (e.g., wheelchair, bedside chair)?  --  --  2  -AIME (r) GR (t) AIME (c)    Climbing 3-5 steps with a railing?  --  --  1  -AIME (r) GR (t) AIME (c)    To walk in hospital room?  --  --  1  -AIME (r) GR (t) AIME (c)    AM-[facility] 6 Clicks Score  --  --  10  -AIME (r) GR (t)       How much help from another is currently needed...    Putting on and taking off regular lower body clothing?  2  -MM  2  -AC  --    Bathing (including washing, rinsing, and drying)  2  -MM  2  -AC  --    Toileting (which includes using toilet bed pan or urinal)  2  -MM  2  -AC  --    Putting on and taking off regular upper body clothing  2  -MM  2  -AC  --    Taking care of personal grooming (such as brushing teeth)  2  -MM  2  -AC  --    Eating meals  3  -MM  3  -AC  --    Score  13  -MM  13  -AC  --       Functional Assessment    Outcome Measure Options  --  AM-PAC 6 Clicks Daily Activity (OT)  -  AM-PAC 6 Clicks Basic Mobility (PT)  -AIME (r) GR (t) AIME (c)      User Key  (r) = Recorded By, (t) = Taken By, (c) = Cosigned By    Initials Name Provider Type    AC Lisandro Gaxiola, OTR/L Occupational Therapist    Fadi Johnson, PT DPT Physical Therapist    Charo Hernandez COTA/L Occupational Therapy Assistant    GR Shyann Maynard, PT Student PT Student           Time Calculation:   Time Calculation- OT     Row Name 04/08/19 1308             Time Calculation- OT    OT Start Time  1308  -MM      OT Stop Time  1339  -MM      OT Time Calculation (min)  31 min  -MM      Total Timed Code Minutes- OT  31 minute(s)  -MM      OT Received On  04/08/19  -MM         Timed Charges    24518 - OT Therapeutic Exercise Minutes  20  -MM      06432 - OT Self  Care/Mgmt Minutes  11  -MM        User Key  (r) = Recorded By, (t) = Taken By, (c) = Cosigned By    Initials Name Provider Type    MM Charo Petit COTA/L Occupational Therapy Assistant        Therapy Charges for Today     Code Description Service Date Service Provider Modifiers Qty    13450146604  OT THER PROC EA 15 MIN 2019 Charo Petit COTA/L GO, KX 1    13207990859 HC OT SELF CARE/MGMT/TRAIN EA 15 MIN 2019 Charo Petit COTA/L GO, KX 1          OT G-codes  OT Professional Judgement Used?: Yes  OT Functional Scales Options: AM-PAC 6 Clicks Daily Activity (OT)  Score: 13  Functional Limitation: Self care  Self Care Current Status (): At least 60 percent but less than 80 percent impaired, limited or restricted  Self Care Goal Status (): At least 40 percent but less than 60 percent impaired, limited or restricted    ANDREA Machado  2019    Electronically signed by Charo Petit COTA/L at 2019  1:48 PM          Speech Language Pathology Notes (most recent note)      Julia Nevarez CCC-SLP at 2019  9:39 AM          Acute Care - Speech Language Pathology   Swallow Treatment Note [facility]     Patient Name: Vitaly Pantoja  : 1960  MRN: [id number]  Today's Date: 2019  Onset of Illness/Injury or Date of Surgery: 19     Referring Physician: Dr. Milligan      Admit Date: 2019  Pt completed 25 minutes of NMES on A1 setting at 26.5 mA. He swallowed during contractions times approximately 15 to 20% of the time. Pt completed trials of ice chips during NMES with no overt s/s of aspiration, however he did exhibit silent aspiration on a previous VFSS in radiology. Also observed nurseDomonique give pt meds crushed in applesauce. Again no ovet s/s of aspiration were observed, but aspiration cannot be ruled out as the pt may experience silent aspiration. Educated pt to use chin tuck during swallows. SLP will continue to follow and treat for  dysphagia.   Julia Nevarez CCC-SLP 4/8/2019 9:39 AM    Visit Dx:      ICD-10-CM ICD-9-CM   1. Shock, septic (CMS/[facility]) A41.9 038.9    R65.21 785.52     995.92   2. Cardiac arrest (CMS/[facility]) I46.9 427.5   3. Oropharyngeal dysphagia R13.12 787.22   4. Decreased activities of daily living (ADL) R68.89 780.99   5. Impaired mobility Z74.09 799.89     Patient Active Problem List   Diagnosis   • Sepsis (CMS/[facility])       Therapy Treatment  Rehabilitation Treatment Summary     Row Name 04/08/19 0836             Treatment Time/Intention    Discipline  speech language pathologist  -MB      Document Type  therapy note (daily note)  -MB      Subjective Information  complains of;fatigue  -MB      Mode of Treatment  speech-language pathology  -MB      Patient/Family Observations  No family present  -MB      Patient Effort  fair  -MB      Recorded by [MB] Julia Nevarez CCC-SLP 04/08/19 0932      Row Name 04/08/19 0836             Pain Scale: FACES Pre/Post-Treatment    Pain: FACES Scale, Pretreatment  2-->hurts little bit  -MB      Recorded by [MB] Julia Nevarez CCC-SLP 04/08/19 0932      Row Name                Wound 04/01/19 1811 Left anterior foot pressure injury    Wound - Properties Group Date first assessed: 04/01/19 [AW] Time first assessed: 1811 [AW] Present On Admission : picture taken [AW] Side: Left [AW] Orientation: anterior [AW] Location: foot [AW] Type: pressure injury [AW] Stage, Pressure Injury: Stage 3 [AW] Recorded by:  [AW] Philly Palma RN 04/01/19 1812    Row Name                Wound 04/01/19 1817 Right anterior foot diabetic/neuropathic ulceration    Wound - Properties Group Date first assessed: 04/01/19 [AW] Time first assessed: 1817 [AW] Present On Admission : yes;picture taken [AW] Side: Right [AW] Orientation: anterior [AW] Location: foot [AW] Type: diabetic/neuropathic ulceration [AW2] Stage, Pressure Injury: -- [AW2] Recorded by:  [AW] Philly Palma RN 04/01/19 1817 [AW2] Louie  Philly BAUTISTA RN 04/02/19 1247    Row Name 04/08/19 0836             Outcome Summary/Treatment Plan (SLP)    Daily Summary of Progress (SLP)  progress towards functional goals is fair  -MB      Barriers to Overall Progress (SLP)  Mouth sores  -MB      Plan for Continued Treatment (SLP)  Pureed diet and honey thick liquids started per MD. Continue to follow for swallow therapy  -MB      Anticipated Dischage Disposition  skilled nursing facility  -MB      Recorded by [MB] Julia Nevarez, CCC-SLP 04/08/19 0932        User Key  (r) = Recorded By, (t) = Taken By, (c) = Cosigned By    Initials Name Effective Dates Discipline    MB Julia Nevarez CCC-SLP 08/02/16 -  SLP    Philly Molina RN 08/02/16 -  Nurse          Outcome Summary  Outcome Summary/Treatment Plan (SLP)  Daily Summary of Progress (SLP): progress towards functional goals is fair (04/08/19 0836 : Julia Nevarez, CCC-SLP)  Barriers to Overall Progress (SLP): Mouth sores (04/08/19 0836 : Julia Nevarez, CCC-SLP)  Plan for Continued Treatment (SLP): Pureed diet and honey thick liquids started per MD. Continue to follow for swallow therapy (04/08/19 0836 : Julia Nevarez, CCC-SLP)  Anticipated Dischage Disposition: skilled nursing facility (04/08/19 0836 : Julia Nevarez, CCC-SLP)      SLP GOALS     Row Name 04/08/19 0836 04/05/19 1504 04/05/19 0952       Oral Nutrition/Hydration Goal 1 (SLP)    Oral Nutrition/Hydration Goal 1, SLP  Pt will tolerate least restrictive diet with no overt s/s of aspiration.  -MB  Pt will tolerate least restrictive diet with no overt s/s of aspiration.  -MB  Pt will tolerate least restrictive diet with no overt s/s of aspiration.  -MB    Time Frame (Oral Nutrition/Hydration Goal 1, SLP)  by discharge  -MB  by discharge  -MB  by discharge  -MB    Barriers (Oral Nutrition/Hydration Goal 1, SLP)  none  -MB  none  -MB  none  -MB    Progress/Outcomes (Oral Nutrition/Hydration Goal 1, SLP)  continuing progress  toward goal  -MB  goal ongoing  -MB  continuing progress toward goal  -MB       Pharyngeal Strengthening Exercise Goal 1 (SLP)    Activity (Pharyngeal Strengthening Goal 1, SLP)  increase superior movement of the hyolaryngeal complex;increase anterior movement of the hyolaryngeal complex;increase squeeze/positive pressure generation  -MB  increase superior movement of the hyolaryngeal complex;increase anterior movement of the hyolaryngeal complex;increase squeeze/positive pressure generation  -MB  --    Increase Superior Movement of the Hyolaryngeal Complex  Mendelsohn NMES  -MB  Mendelsohn NMES  -MB  --    Increase Anterior Movement of the Hyolaryngeal Complex  shaker  -MB  shaker  -MB  --    Increase Squeeze/Positive Pressure Generation  hard effortful swallow;marsha  -MB  hard effortful swallow;marsha  -MB  --    Roby/Accuracy (Pharyngeal Strengthening Goal 1, SLP)  with minimal cues (75-90% accuracy)  -MB  with minimal cues (75-90% accuracy)  -MB  --    Time Frame (Pharyngeal Strengthening Goal 1, SLP)  short term goal (STG);by discharge  -MB  short term goal (STG);by discharge  -MB  --    Barriers (Pharyngeal Strengthening Goal 1, SLP)  mouth sores  -MB  n/a  -MB  --    Progress/Outcomes (Pharyngeal Strengthening Goal 1, SLP)  continuing progress toward goal  -MB  goal ongoing  -MB  --      User Key  (r) = Recorded By, (t) = Taken By, (c) = Cosigned By    Initials Name Provider Type    Julia Saenz, CCC-SLP Speech and Language Pathologist          EDUCATION  The patient has been educated in the following areas:   Dysphagia (Swallowing Impairment).    SLP Recommendation and Plan  Daily Summary of Progress (SLP): progress towards functional goals is fair  Barriers to Overall Progress (SLP): Mouth sores  Plan for Continued Treatment (SLP): Pureed diet and honey thick liquids started per MD. Continue to follow for swallow therapy  Anticipated Dischage Disposition: skilled nursing facility                 SLP Outcome Measures (last 72 hours)      SLP Outcome Measures     Row Name 04/06/19 1100             SLP Outcome Measures    Outcome Measure Used?  Adult NOMS  -PH         Adult FCM Scores    FCM Chosen  Swallowing  -PH      Swallowing FCM Score  7  -PH        User Key  (r) = Recorded By, (t) = Taken By, (c) = Cosigned By    Initials Name Effective Dates    PH Araseli Arita, CCC-SLP 08/02/16 -              Time Calculation:   Time Calculation- SLP     Row Name 04/08/19 0938             Time Calculation- SLP    SLP Start Time  0836  -MB      SLP Stop Time  0926  -MB      SLP Time Calculation (min)  50 min  -MB      SLP Received On  04/08/19  -MB        User Key  (r) = Recorded By, (t) = Taken By, (c) = Cosigned By    Initials Name Provider Type    Julia Saenz CCC-SLP Speech and Language Pathologist          Therapy Charges for Today     Code Description Service Date Service Provider Modifiers Qty    10222121645 HC ST TREATMENT SWALLOW 3 4/8/2019 Julia Nevarez CCC-SLP GN, KX 1                 CHRISTINA Cody  4/8/2019    Electronically signed by Julia Nevarez CCC-SLP at 4/8/2019  9:39 AM       Respiratory Therapy Notes (most recent note)     No notes of this type exist for this encounter.

## 2019-04-09 NOTE — PROGRESS NOTES
Continued Stay Note   [facility]     Patient Name: Vitaly Pantoja  MRN: [id number]  Today's Date: 4/9/2019    Admit Date: 4/1/2019    Discharge Plan     Row Name 04/09/19 0849       Plan    Plan  VIJI spoke to pt's brother, Catrachito [phone].  He stated that he isn't sure if pt has a guardian but needs one.  He states he is not close to his brother and does not want guardianship.  Pt's father is in [place] and has been estranged for years,  Adamaris Fonseca states pt does not have guardian.  VIJI verified with Lawrence Vaughn that he does not have guardianship.  However, he is aware of this pt and his needs.  VIJI has spoke to charge nurse, Mariel who will request a guardianship letter from MD.  VIJI will follow.  CECILIA Lucas.    Patient/Family in Agreement with Plan  yes        Discharge Codes    No documentation.             CECILIA Chan

## 2019-04-09 NOTE — PROGRESS NOTES
"Pharmacy Dosing Service  Pharmacokinetics  Vancomycin Follow-up Evaluation    Assessment/Action/Plan:  Patient is currently receiving Vancomycin 1250 mg IV every 12 hours for the treatment of SSTI, and MRSA Pneumonia, day 9 of therapy. Current vancomycin end date: 4/12/19. Labs/dose reviewed. Scr stable, WBC trending down. Ordered a vancomycin trough level tomorrow, 4/10/19 at 1600, prior to the 4th dose. Pharmacy will continue to monitor renal function and adjust dose accordingly.     Subjective:  Vitaly Pantoja is a 59 y.o. male    Objective:  Ht: 165.1 cm (65\"); Wt: 78.2 kg (172 lb 6 oz)  Estimated Creatinine Clearance: 116.3 mL/min (by C-G formula based on SCr of 0.66 mg/dL).   Lab Results   Component Value Date    CREATININE 0.66 04/09/2019    CREATININE 0.65 04/07/2019    CREATININE 0.56 04/06/2019      Lab Results   Component Value Date    WBC 11.28 (H) 04/09/2019    WBC 13.79 (H) 04/08/2019    WBC 14.33 (H) 04/07/2019         Lab Results   Component Value Date    VANCOTROUGH 19.14 04/08/2019    VANCORANDOM 8.05 04/04/2019       Culture Results:  Microbiology Results (last 10 days)       Procedure Component Value - Date/Time    Blood Culture With FAN - Blood, Arm, Left [[id number]] Collected:  04/07/19 1704    Lab Status:  Preliminary result Specimen:  Blood from Arm, Left Updated:  04/08/19 1745     Blood Culture No growth at 24 hours    Blood Culture With FAN - Blood, Arm, Right [[id number]] Collected:  04/07/19 1704    Lab Status:  Preliminary result Specimen:  Blood from Arm, Right Updated:  04/08/19 1745     Blood Culture No growth at 24 hours    Blood Culture With FAN - Blood, Arm, Right [[id number]] Collected:  04/03/19 1513    Lab Status:  Final result Specimen:  Blood from Arm, Right Updated:  04/08/19 1600     Blood Culture No growth at 5 days    Influenza Antigen, Rapid - Swab, Nasopharynx [[id number]]  (Normal) Collected:  04/01/19 4393    Lab Status:  Final result Specimen:  Swab from Nasopharynx " Updated:  04/01/19 2318     Influenza A Ag, EIA Negative     Influenza B Ag, EIA Negative    Narrative:       Recommend confirmation of negative results by viral culture or molecular assay.    S. Pneumo Ag Urine or CSF - Urine, Urine, Clean Catch [[id number]]  (Normal) Collected:  04/01/19 2055    Lab Status:  Final result Specimen:  Urine, Clean Catch Updated:  04/01/19 2117     Strep Pneumo Ag Negative    Legionella Antigen, Urine - Urine, Urine, Clean Catch [[id number]]  (Normal) Collected:  04/01/19 2055    Lab Status:  Final result Specimen:  Urine, Clean Catch Updated:  04/01/19 2117     LEGIONELLA ANTIGEN, URINE Negative    Urine Culture - Urine, Urine, Catheter [[id number]]  (Normal) Collected:  04/01/19 1933    Lab Status:  Final result Specimen:  Urine, Catheter Updated:  04/03/19 0942     Urine Culture No growth at 2 days    Respiratory Culture - Sputum, Cough [[id number]]  (Abnormal)  (Susceptibility) Collected:  04/01/19 1745    Lab Status:  Final result Specimen:  Sputum from Cough Updated:  04/05/19 0658     Respiratory Culture Heavy growth (4+) Normal Respiratory Twan      Moderate growth (3+) Staphylococcus aureus, MRSA     Comment:   Methicillin resistant Staphylococcus aureus, Patient may be an isolation risk.        Gram Stain Greater than 25 WBCs per low power field      Many (4+) Mixed gram positive twan      Few (2+) Epithelial cells seen    Susceptibility        Staphylococcus aureus, MRSA     ERIK     Clindamycin Susceptible     Erythromycin Susceptible     Gentamicin Susceptible     Inducible Clindamycin Resistance Negative     Levofloxacin Susceptible [1]      Oxacillin Resistant     Penicillin G Resistant     Tetracycline Susceptible     Trimethoprim + Sulfamethoxazole Susceptible     Vancomycin Susceptible              [1]   Staphylococcus species may develop resistance during prolonged therapy with quinolones. Isolates that are initially susceptible may become resistant within three to  four days after initiation of therapy. Testing of repeat isolates may be warranted.                Susceptibility Comments       Staphylococcus aureus, MRSA    This isolate does not demonstrate inducible clindamycin resistance in vitro.                 Blood Culture - Blood, Hand, Right [[id number]] Collected:  04/01/19 1625    Lab Status:  Final result Specimen:  Blood from Hand, Right Updated:  04/06/19 1700     Blood Culture No growth at 5 days    Blood Culture - Blood, Hand, Left [[id number]] Collected:  04/01/19 1625    Lab Status:  Final result Specimen:  Blood from Hand, Left Updated:  04/06/19 1700     Blood Culture No growth at 5 days          Antimicrobials:  Anti-Infectives (From admission, onward)      Ordered     Dose/Rate Route Frequency Start Stop    04/08/19 2103  vancomycin 1250 mg/250 mL 0.9% NS IVPB ([facility])     Ordering Provider:  Jadon Burnett MD    1,250 mg  over 90 Minutes Intravenous Every 12 Hours 04/09/19 0500 04/12/19 1659    04/04/19 0947  cefepime (MAXIPIME) 2 g/100 mL 0.9% NS ([facility])     Ordering Provider:  Luke Matthews MD    2 g  200 mL/hr over 30 Minutes Intravenous Once 04/04/19 1045 04/04/19 1346    04/01/19 1741  meropenem (MERREM) 1 g/100 mL 0.9% NS VTB ([facility])     Ordering Provider:  Jadon Burnett MD    1 g  over 30 Minutes Intravenous Once 04/01/19 1830 04/01/19 2054    04/01/19 1608  piperacillin-tazobactam (ZOSYN) 4.5 g in iso-osmotic dextrose 100 mL IVPB (premix)     Ordering Provider:  Jadon Burnett MD    4.5 g  over 30 Minutes Intravenous Once 04/01/19 1700 04/01/19 2053    04/01/19 1616  vancomycin 1500 mg/500 mL 0.9% NS IVPB ([facility])     Ordering Provider:  Jadon Burnett MD    20 mg/kg × 79.6 kg  over 180 Minutes Intravenous Once 04/01/19 1700 04/01/19 1930            Alesiso Collins, PharmD   04/09/19 7:23 AM

## 2019-04-09 NOTE — THERAPY TREATMENT NOTE
Acute Care - Speech Language Pathology   Swallow Treatment Note [facility]     Patient Name: Vitaly Pantoja  : 1960  MRN: [id number]  Today's Date: 2019  Onset of Illness/Injury or Date of Surgery: 19     Referring Physician: Dr. Milligan      Admit Date: 2019  Pt completed 15 minutes of NMES on A1 setting at 26.5 mA. He swallowed during contractions approximately 30% of the time. He completed ice chip trials during NMES with no overt s/s of aspiration. SLP encouraged pt to complete 20 minutes of NMES, but he declined and wanted to stop at 15. SLP will continue to follow.   Julia Nevarez CCC-SLP 2019 1:55 PM    Visit Dx:      ICD-10-CM ICD-9-CM   1. Shock, septic (CMS/[facility]) A41.9 038.9    R65.21 785.52     995.92   2. Cardiac arrest (CMS/[facility]) I46.9 427.5   3. Oropharyngeal dysphagia R13.12 787.22   4. Decreased activities of daily living (ADL) R68.89 780.99   5. Impaired mobility Z74.09 799.89     Patient Active Problem List   Diagnosis   • Septic shock    • Dysphagia   • MRSA pneumonia (CMS/[facility])   • Cardiac arrest (CMS/[facility]), PEA    • Type 2 diabetes mellitus with complication (CMS/[facility]), hyperglycemia and peripheral neuropathy    • Beta hemolytic Streptococcus culture positive, bacteremia at OSH    • Acute hypoxemic respiratory failure (CMS/[facility])       Therapy Treatment  Rehabilitation Treatment Summary     Row Name 19 1322 19 1301          Treatment Time/Intention    Discipline  speech language pathologist  -MB  physical therapy assistant  -AF     Document Type  therapy note (daily note)  -MB  therapy note (daily note)  -AF     Subjective Information  complains of;pain  -MB  complains of;weakness;fatigue  -AF2     Mode of Treatment  speech-language pathology  -MB  --     Patient/Family Observations  No family present  -MB  --     Patient Effort  adequate  -MB  --     Existing Precautions/Restrictions  --  fall  -AF     Recorded by [MB] Julia Nevarez CCC-SLP 19  1352 [AF] Sia Martínez, PTA 04/09/19 1316  [AF2] Sia Martínez, [facility] 04/09/19 1327     Row Name 04/09/19 1327             Bed Mobility Assessment/Treatment    Rolling Left Sabine (Bed Mobility)  verbal cues;minimum assist (75% patient effort)  -AF      Supine-Sit Sabine (Bed Mobility)  verbal cues;moderate assist (50% patient effort)  -AF      Recorded by [AF] Sia Martínez, PTA 04/09/19 1331      Row Name 04/09/19 1327             Sit-Stand Transfer    Sit-Stand Sabine (Transfers)  verbal cues;moderate assist (50% patient effort)  -AF      Recorded by [AF] Sia Martínez, [facility] 04/09/19 1331      Row Name 04/09/19 1327             Stand-Sit Transfer    Stand-Sit Sabine (Transfers)  verbal cues;minimum assist (75% patient effort)  -AF      Recorded by [AF] Sia Martínez, [facility] 04/09/19 1331      Row Name 04/09/19 1327             Gait/Stairs Assessment/Training    Sabine Level (Gait)  moderate assist (50% patient effort);verbal cues  -AF      Distance in Feet (Gait)  2  -AF      Recorded by [AF] Sia Martínez, [facility] 04/09/19 1331      Row Name 04/09/19 1327             Therapeutic Exercise    Lower Extremity Range of Motion (Therapeutic Exercise)  ankle dorsiflexion/plantar flexion, bilateral;knee flexion/extension, bilateral  -AF      Exercise Type (Therapeutic Exercise)  AROM (active range of motion)  -AF      Position (Therapeutic Exercise)  seated  -AF      Sets/Reps (Therapeutic Exercise)  10  -AF      Recorded by [AF] Sia Martínez, PTA 04/09/19 1331      Row Name 04/09/19 1327             Static Sitting Balance    Level of Sabine (Supported Sitting, Static Balance)  contact guard assist;minimal assist, 75% patient effort  -AF      Sitting Position (Supported Sitting, Static Balance)  sitting on edge of bed  -AF      Time Able to Maintain Position (Supported Sitting, Static Balance)  3 to 4 minutes  -AF      Recorded by [AF] Sia Martínez, PTA  04/09/19 1331      Row Name 04/09/19 1327             Positioning and Restraints    Pre-Treatment Position  in bed  -AF      Post Treatment Position  chair  -AF      In Chair  sitting;call light within reach;exit alarm on;notified nsg  -AF      Recorded by [AF] Sia Martínez, \[facility]\"" 04/09/19 1331      Row Name 04/09/19 1327             Pain Scale: Numbers Pre/Post-Treatment    Pain Scale: Numbers, Pretreatment  10/10  -AF      Pain Scale: Numbers, Post-Treatment  9/10  -AF      Recorded by [AF] Sia Martínez, \[facility]\"" 04/09/19 1331      Row Name 04/09/19 1322             Pain Scale: FACES Pre/Post-Treatment    Pain: FACES Scale, Pretreatment  2-->hurts little bit  -MB      Recorded by [MB] Julia Nevarez, CCC-SLP 04/09/19 1352      Row Name                Wound 04/01/19 1811 Left anterior foot pressure injury    Wound - Properties Group Date first assessed: 04/01/19 [AW] Time first assessed: 1811 [AW] Present On Admission : picture taken [AW] Side: Left [AW] Orientation: anterior [AW] Location: foot [AW] Type: pressure injury [AW] Stage, Pressure Injury: Stage 3 [AW] Recorded by:  [AW] Philly Palma RN 04/01/19 1812    Row Name                Wound 04/01/19 1817 Right anterior foot diabetic/neuropathic ulceration    Wound - Properties Group Date first assessed: 04/01/19 [AW] Time first assessed: 1817 [AW] Present On Admission : yes;picture taken [AW] Side: Right [AW] Orientation: anterior [AW] Location: foot [AW] Type: diabetic/neuropathic ulceration [AW2] Stage, Pressure Injury: -- [AW2] Recorded by:  [AW] Philly Palma RN 04/01/19 1817 [AW2] Philly Palma RN 04/02/19 1247    Row Name 04/09/19 1322             Outcome Summary/Treatment Plan (SLP)    Daily Summary of Progress (SLP)  progress towards functional goals is fair  -MB      Barriers to Overall Progress (SLP)  Cognition  -MB      Plan for Continued Treatment (SLP)  Continue to follow  -MB      Anticipated Dischage Disposition  skilled  nursing facility  -MB      Recorded by [MB] Julia Nevarez CCC-SLP 04/09/19 1352        User Key  (r) = Recorded By, (t) = Taken By, (c) = Cosigned By    Initials Name Effective Dates Discipline    Julia Saenz CCC-SLP 08/02/16 -  SLP    Philly Molina, RN 08/02/16 -  Nurse    Sia Traore PTA 02/11/19 -  PT          Outcome Summary  Outcome Summary/Treatment Plan (SLP)  Daily Summary of Progress (SLP): progress towards functional goals is fair (04/09/19 1322 : Julia Nevarez, CCC-SLP)  Barriers to Overall Progress (SLP): Cognition (04/09/19 1322 : Julia Nevarez CCC-SLP)  Plan for Continued Treatment (SLP): Continue to follow (04/09/19 1322 : Julia Nevarez CCC-SLP)  Anticipated Dischage Disposition: skilled nursing facility (04/09/19 1322 : Julia Nevarez CCC-SLP)      SLP GOALS     Row Name 04/09/19 1322 04/08/19 0836          Oral Nutrition/Hydration Goal 1 (SLP)    Oral Nutrition/Hydration Goal 1, SLP  Pt will tolerate least restrictive diet with no overt s/s of aspiration.  -MB  Pt will tolerate least restrictive diet with no overt s/s of aspiration.  -MB     Time Frame (Oral Nutrition/Hydration Goal 1, SLP)  by discharge  -MB  by discharge  -MB     Barriers (Oral Nutrition/Hydration Goal 1, SLP)  Cognition  -MB  none  -MB     Progress/Outcomes (Oral Nutrition/Hydration Goal 1, SLP)  continuing progress toward goal  -MB  continuing progress toward goal  -MB        Pharyngeal Strengthening Exercise Goal 1 (SLP)    Activity (Pharyngeal Strengthening Goal 1, SLP)  increase superior movement of the hyolaryngeal complex;increase anterior movement of the hyolaryngeal complex;increase squeeze/positive pressure generation  -MB  increase superior movement of the hyolaryngeal complex;increase anterior movement of the hyolaryngeal complex;increase squeeze/positive pressure generation  -MB     Increase Superior Movement of the Hyolaryngeal Complex  Mendelsohn NMES  -MB   Mendelsohn NMES  -MB     Increase Anterior Movement of the Hyolaryngeal Complex  shaker  -MB  shaker  -MB     Increase Squeeze/Positive Pressure Generation  hard effortful swallow;marsha  -MB  hard effortful swallow;marsha  -MB     Linch/Accuracy (Pharyngeal Strengthening Goal 1, SLP)  with minimal cues (75-90% accuracy)  -MB  with minimal cues (75-90% accuracy)  -MB     Time Frame (Pharyngeal Strengthening Goal 1, SLP)  short term goal (STG);by discharge  -MB  short term goal (STG);by discharge  -MB     Barriers (Pharyngeal Strengthening Goal 1, SLP)  Cognition  -MB  mouth sores  -MB     Progress/Outcomes (Pharyngeal Strengthening Goal 1, SLP)  continuing progress toward goal  -MB  continuing progress toward goal  -MB       User Key  (r) = Recorded By, (t) = Taken By, (c) = Cosigned By    Initials Name Provider Type    Julia Saenz CCC-SLP Speech and Language Pathologist          EDUCATION  The patient has been educated in the following areas:   Dysphagia (Swallowing Impairment).    SLP Recommendation and Plan  Daily Summary of Progress (SLP): progress towards functional goals is fair  Barriers to Overall Progress (SLP): Cognition  Plan for Continued Treatment (SLP): Continue to follow  Anticipated Dischage Disposition: skilled nursing facility                    Time Calculation:   Time Calculation- SLP     Row Name 04/09/19 1354             Time Calculation- SLP    SLP Start Time  1322  -MB      SLP Stop Time  1350  -MB      SLP Time Calculation (min)  28 min  -MB      SLP Received On  04/09/19  -MB        User Key  (r) = Recorded By, (t) = Taken By, (c) = Cosigned By    Initials Name Provider Type    Julia Saenz CCC-SLP Speech and Language Pathologist          Therapy Charges for Today     Code Description Service Date Service Provider Modifiers Qty    47754962181 HC ST TREATMENT SWALLOW 3 4/8/2019 Julia Nevarez CCC-SLP GN, KX 1    [id number] HC ST TREATMENT SWALLOW 2 4/9/2019  Julia Nevarez, CCC-CELSO GN, KX 1                 Julia DAMON. CHRISTINA Nevarez  4/9/2019

## 2019-04-09 NOTE — PROGRESS NOTES
"Pharmacy Dosing Service  Pharmacokinetics  Vancomycin Follow-up Evaluation    Assessment/Action/Plan:  Changed Vancomycin dose from 1 gram iv q8h to Vancomycin 1250 mg iv q12h due to a trough of 19.14 obtained approximately 8.25 hours post dose. Pharmacy will continue to monitor renal function and adjust dose accordingly.     Subjective:  Vitaly Pantoja is a 59 y.o. male currently on Vancomycin 1250 mg IV every 12 hours for the treatment of skin and soft tissue infection (Current vancomycin end date: 4/12 scheduled at 0500).    Objective:  Ht: 165.1 cm (65\"); Wt: 78.2 kg (172 lb 6 oz)  Estimated Creatinine Clearance: 118 mL/min (by C-G formula based on SCr of 0.65 mg/dL).   Lab Results   Component Value Date    CREATININE 0.65 04/07/2019    CREATININE 0.56 04/06/2019    CREATININE 0.67 04/05/2019      Lab Results   Component Value Date    WBC 13.79 (H) 04/08/2019    WBC 14.33 (H) 04/07/2019    WBC 12.20 (H) 04/06/2019         Lab Results   Component Value Date    VANCOTROUGH 19.14 04/08/2019    VANCORANDOM 8.05 04/04/2019       Culture Results:  Microbiology Results (last 10 days)       Procedure Component Value - Date/Time    Blood Culture With FAN - Blood, Arm, Left [[id number]] Collected:  04/07/19 1704    Lab Status:  Preliminary result Specimen:  Blood from Arm, Left Updated:  04/08/19 1745     Blood Culture No growth at 24 hours    Blood Culture With FAN - Blood, Arm, Right [[id number]] Collected:  04/07/19 1704    Lab Status:  Preliminary result Specimen:  Blood from Arm, Right Updated:  04/08/19 1745     Blood Culture No growth at 24 hours    Blood Culture With FAN - Blood, Arm, Right [[id number]] Collected:  04/03/19 1513    Lab Status:  Final result Specimen:  Blood from Arm, Right Updated:  04/08/19 1600     Blood Culture No growth at 5 days    Influenza Antigen, Rapid - Swab, Nasopharynx [[id number]]  (Normal) Collected:  04/01/19 1044    Lab Status:  Final result Specimen:  Swab from Nasopharynx Updated: "  04/01/19 2318     Influenza A Ag, EIA Negative     Influenza B Ag, EIA Negative    Narrative:       Recommend confirmation of negative results by viral culture or molecular assay.    S. Pneumo Ag Urine or CSF - Urine, Urine, Clean Catch [[id number]]  (Normal) Collected:  04/01/19 2055    Lab Status:  Final result Specimen:  Urine, Clean Catch Updated:  04/01/19 2117     Strep Pneumo Ag Negative    Legionella Antigen, Urine - Urine, Urine, Clean Catch [[id number]]  (Normal) Collected:  04/01/19 2055    Lab Status:  Final result Specimen:  Urine, Clean Catch Updated:  04/01/19 2117     LEGIONELLA ANTIGEN, URINE Negative    Urine Culture - Urine, Urine, Catheter [[id number]]  (Normal) Collected:  04/01/19 1933    Lab Status:  Final result Specimen:  Urine, Catheter Updated:  04/03/19 0942     Urine Culture No growth at 2 days    Respiratory Culture - Sputum, Cough [[id number]]  (Abnormal)  (Susceptibility) Collected:  04/01/19 1745    Lab Status:  Final result Specimen:  Sputum from Cough Updated:  04/05/19 0658     Respiratory Culture Heavy growth (4+) Normal Respiratory Twan      Moderate growth (3+) Staphylococcus aureus, MRSA     Comment:   Methicillin resistant Staphylococcus aureus, Patient may be an isolation risk.        Gram Stain Greater than 25 WBCs per low power field      Many (4+) Mixed gram positive twan      Few (2+) Epithelial cells seen    Susceptibility        Staphylococcus aureus, MRSA     ERIK     Clindamycin Susceptible     Erythromycin Susceptible     Gentamicin Susceptible     Inducible Clindamycin Resistance Negative     Levofloxacin Susceptible [1]      Oxacillin Resistant     Penicillin G Resistant     Tetracycline Susceptible     Trimethoprim + Sulfamethoxazole Susceptible     Vancomycin Susceptible              [1]   Staphylococcus species may develop resistance during prolonged therapy with quinolones. Isolates that are initially susceptible may become resistant within three to four days  after initiation of therapy. Testing of repeat isolates may be warranted.                Susceptibility Comments       Staphylococcus aureus, MRSA    This isolate does not demonstrate inducible clindamycin resistance in vitro.                 Blood Culture - Blood, Hand, Right [[id number]] Collected:  04/01/19 1625    Lab Status:  Final result Specimen:  Blood from Hand, Right Updated:  04/06/19 1700     Blood Culture No growth at 5 days    Blood Culture - Blood, Hand, Left [[id number]] Collected:  04/01/19 1625    Lab Status:  Final result Specimen:  Blood from Hand, Left Updated:  04/06/19 1700     Blood Culture No growth at 5 days            Krystal Barton RPH   04/08/19 9:07 PM

## 2019-04-09 NOTE — PROGRESS NOTES
Continued Stay Note  [facility]     Patient Name: Vitaly Pantoja  MRN: [id number]  Today's Date: 4/9/2019    Admit Date: 4/1/2019    Discharge Plan     Row Name 04/09/19 1423       Plan    Plan  [facility] has offered a bed on pt.  Pt can dc tomorrow but they need dc summary/meds/scripts by 12:00 noon.   has informed Mariel charge nurse.  CECILIA Lucas.    Patient/Family in Agreement with Plan  yes    Row Name 04/09/19 5478       Plan    Plan  MD feels pt can make his own decisions about SNF/medical treatment.  He stated in the future pt's PCP might want to look into writing a letter for guardianship.  Pt is in agreement for SNF.  Pt lives in [place] so will try to keep him close to this area.  Referral has been made to [facility] in [place].  SW will follow for bed offer.  CECILIA Lucas.     Patient/Family in Agreement with Plan  yes        Discharge Codes    No documentation.             CECILIA Chan

## 2019-04-09 NOTE — THERAPY TREATMENT NOTE
Acute Care - Physical Therapy Treatment Note  [facility]     Patient Name: Vitaly Pantoja  : 1960  MRN: [id number]  Today's Date: 2019  Onset of Illness/Injury or Date of Surgery: 19  Date of Referral to PT: 19  Referring Physician: Dr. Milligan    Admit Date: 2019    Visit Dx:    ICD-10-CM ICD-9-CM   1. Shock, septic (CMS/[facility]) A41.9 038.9    R65.21 785.52     995.92   2. Cardiac arrest (CMS/[facility]) I46.9 427.5   3. Oropharyngeal dysphagia R13.12 787.22   4. Decreased activities of daily living (ADL) R68.89 780.99   5. Impaired mobility Z74.09 799.89     Patient Active Problem List   Diagnosis   • Septic shock    • Dysphagia   • MRSA pneumonia (CMS/[facility])   • Cardiac arrest (CMS/[facility]), PEA    • Type 2 diabetes mellitus with complication (CMS/HCC), hyperglycemia and peripheral neuropathy    • Beta hemolytic Streptococcus culture positive, bacteremia at OSH    • Acute hypoxemic respiratory failure (CMS/[facility])       Therapy Treatment    Rehabilitation Treatment Summary     Row Name 19 1301             Treatment Time/Intention    Discipline  physical therapy assistant  -AF      Document Type  therapy note (daily note)  -AF      Subjective Information  complains of;weakness;fatigue  -AF2      Existing Precautions/Restrictions  fall  -AF      Recorded by [AF] Sia Martínez, PTA 19 1316  [AF2] Sia Martínez, PTA 19 1327      Row Name 19 1327             Bed Mobility Assessment/Treatment    Rolling Left Ray (Bed Mobility)  verbal cues;minimum assist (75% patient effort)  -AF      Supine-Sit Ray (Bed Mobility)  verbal cues;moderate assist (50% patient effort)  -AF      Recorded by [AF] Sia Martínez, PTA 19 1331      Row Name 19 1327             Sit-Stand Transfer    Sit-Stand Ray (Transfers)  verbal cues;moderate assist (50% patient effort)  -AF      Recorded by [AF] Sia Martínez, PTA 19 1331      Row Name 19  1327             Stand-Sit Transfer    Stand-Sit [place] (Transfers)  verbal cues;minimum assist (75% patient effort)  -AF      Recorded by [AF] Sia Martínez, [facility] 04/09/19 1331      Row Name 04/09/19 1327             Gait/Stairs Assessment/Training    [place] Level (Gait)  moderate assist (50% patient effort);verbal cues  -AF      Distance in Feet (Gait)  2  -AF      Recorded by [AF] Sia Martínez, MICHELLE 04/09/19 1331      Row Name 04/09/19 1327             Therapeutic Exercise    Lower Extremity Range of Motion (Therapeutic Exercise)  ankle dorsiflexion/plantar flexion, bilateral;knee flexion/extension, bilateral  -AF      Exercise Type (Therapeutic Exercise)  AROM (active range of motion)  -AF      Position (Therapeutic Exercise)  seated  -AF      Sets/Reps (Therapeutic Exercise)  10  -AF      Recorded by [AF] Sia Martínez PTA 04/09/19 1331      Row Name 04/09/19 1327             Static Sitting Balance    Level of [place] (Supported Sitting, Static Balance)  contact guard assist;minimal assist, 75% patient effort  -AF      Sitting Position (Supported Sitting, Static Balance)  sitting on edge of bed  -AF      Time Able to Maintain Position (Supported Sitting, Static Balance)  3 to 4 minutes  -AF      Recorded by [AF] Sia Martínez PTA 04/09/19 1331      Row Name 04/09/19 1327             Positioning and Restraints    Pre-Treatment Position  in bed  -AF      Post Treatment Position  chair  -AF      In Chair  sitting;call light within reach;exit alarm on;notified nsg  -AF      Recorded by [AF] Sia Martínez PTA 04/09/19 1331      Row Name 04/09/19 1327             Pain Scale: Numbers Pre/Post-Treatment    Pain Scale: Numbers, Pretreatment  10/10  -AF      Pain Scale: Numbers, Post-Treatment  9/10  -AF      Recorded by [AF] Sia Martínez [facility] 04/09/19 1331      Row Name                Wound 04/01/19 1811 Left anterior foot pressure injury    Wound - Properties Group Date first  assessed: 04/01/19 [AW] Time first assessed: 1811 [AW] Present On Admission : picture taken [AW] Side: Left [AW] Orientation: anterior [AW] Location: foot [AW] Type: pressure injury [AW] Stage, Pressure Injury: Stage 3 [AW] Recorded by:  [AW] Philly Palma RN 04/01/19 1812    Row Name                Wound 04/01/19 1817 Right anterior foot diabetic/neuropathic ulceration    Wound - Properties Group Date first assessed: 04/01/19 [AW] Time first assessed: 1817 [AW] Present On Admission : yes;picture taken [AW] Side: Right [AW] Orientation: anterior [AW] Location: foot [AW] Type: diabetic/neuropathic ulceration [AW2] Stage, Pressure Injury: -- [AW2] Recorded by:  [AW] Philly Palma RN 04/01/19 1817 [AW2] Philly Palma RN 04/02/19 1247      User Key  (r) = Recorded By, (t) = Taken By, (c) = Cosigned By    Initials Name Effective Dates Discipline    AW Philly Palma RN 08/02/16 -  Nurse    Sia Traore, PTA 02/11/19 -  PT          Wound 04/01/19 1811 Left anterior foot pressure injury (Active)   Dressing Appearance intact;moist drainage 4/9/2019  8:01 AM   Closure None 4/9/2019  8:01 AM   Base granulating;pink 4/9/2019  8:01 AM   Periwound intact;moist 4/9/2019  8:01 AM   Periwound Temperature warm 4/9/2019  8:01 AM   Periwound Skin Turgor firm 4/9/2019  8:01 AM   Edges open 4/9/2019  8:01 AM   Drainage Characteristics/Odor serosanguineous;yellow 4/9/2019  8:01 AM   Drainage Amount scant 4/9/2019  8:01 AM   Care, Wound cleansed with;sterile normal saline;moist wound environment maintained 4/9/2019  6:15 AM   Dressing Care, Wound dressing changed;gauze;petroleum-based 4/9/2019  6:15 AM       Wound 04/01/19 1817 Right anterior foot diabetic/neuropathic ulceration (Active)   Dressing Appearance dry;intact;no drainage 4/9/2019  8:01 AM   Closure None 4/9/2019  8:01 AM   Base granulating;pink 4/9/2019  8:01 AM   Periwound pink;moist 4/9/2019  8:01 AM   Periwound Temperature warm 4/9/2019  8:01 AM    Periwound Skin Turgor firm 4/9/2019  8:01 AM   Edges callused 4/9/2019  8:01 AM   Drainage Amount none 4/9/2019  8:01 AM   Care, Wound cleansed with;sterile normal saline;moist wound environment maintained 4/9/2019  6:15 AM   Dressing Care, Wound dressing changed;gauze;petroleum-based 4/9/2019  6:15 AM           Physical Therapy Education     Title: PT OT SLP Therapies (In Progress)     Topic: Physical Therapy (In Progress)     Point: Mobility training (Done)     Learning Progress Summary           Patient Acceptance, E, NR,VU by AF at 4/9/2019  1:27 PM    Comment:  changing positioning throughout the day to prevent back pain. benefits of sitting up in chair vs bed all day.    Acceptance, E, VU,NR by PARVEEN at 4/8/2019 10:53 AM    Comment:  Safety with transfers    Acceptance, E, NR by GR at 4/5/2019  3:33 PM    Comment:  Pt educated on POC and benefit of increased mobility. Pt educated throughout eval how to recovery breathe, but continued to demonstrate episodes of hyperventilation.                   Point: Precautions (In Progress)     Learning Progress Summary           Patient Acceptance, E, NR by GR at 4/5/2019  3:33 PM    Comment:  Pt educated on POC and benefit of increased mobility. Pt educated throughout eval how to recovery breathe, but continued to demonstrate episodes of hyperventilation.                               User Key     Initials Effective Dates Name Provider Type Discipline    PARVEEN 08/02/16 -  Marisol Cantu PTA Physical Therapy Assistant PT    AF 02/11/19 -  Sia Martínez PTA Physical Therapy Assistant PT    GR 03/13/19 -  Shyann Maynard PT Student PT Student PT                PT Recommendation and Plan     Plan of Care Reviewed With: patient  Outcome Summary: Pt. required miniA to roll L and modA to trans SL L to sit EOB. Pt. stated he could not sit up and put forth little effort. He c/o of low back pain and agreed to sit in chair for a while. Pt. required modA to stand from EOB and  to hold him upright while taking short steps to chair. He required VCs to take steps w/ each LE and Sreekanth to sit in chair safely. Once safely in chair, pt. performed ankle pumps and LAQ x 10 before refusing to do more and started c/o of thirst and how much he hates thickened liquids. Pt. will benefit from increased strengthening and activity.   Outcome Measures     Row Name 04/09/19 1327 04/08/19 1308          How much help from another person do you currently need...    Turning from your back to your side while in flat bed without using bedrails?  2  -AF  --     Moving from lying on back to sitting on the side of a flat bed without bedrails?  2  -AF  --     Moving to and from a bed to a chair (including a wheelchair)?  2  -AF  --     Standing up from a chair using your arms (e.g., wheelchair, bedside chair)?  2  -AF  --     Climbing 3-5 steps with a railing?  1  -AF  --     To walk in hospital room?  1  -AF  --     AM-PAC 6 Clicks Score  10  -AF  --        How much help from another is currently needed...    Putting on and taking off regular lower body clothing?  --  2  -MM     Bathing (including washing, rinsing, and drying)  --  2  -MM     Toileting (which includes using toilet bed pan or urinal)  --  2  -MM     Putting on and taking off regular upper body clothing  --  2  -MM     Taking care of personal grooming (such as brushing teeth)  --  2  -MM     Eating meals  --  3  -MM     Score  --  13  -MM        Functional Assessment    Outcome Measure Options  AM-PAC 6 Clicks Basic Mobility (PT)  -AF  --       User Key  (r) = Recorded By, (t) = Taken By, (c) = Cosigned By    Initials Name Provider Type    MM Charo Petit COTA/L Occupational Therapy Assistant    Sia Traore PTA Physical Therapy Assistant         Time Calculation:   PT Charges     Row Name 04/09/19 1327             Time Calculation    Start Time  1301  -AF      Stop Time  1325  -AF      Time Calculation (min)  24 min  -AF      PT  Received On  04/09/19  -AF      PT Goal Re-Cert Due Date  04/15/19  -AF         Timed Charges    90121 - PT Therapeutic Exercise Minutes  12  -AF      93308 - PT Therapeutic Activity Minutes  12 -AF        User Key  (r) = Recorded By, (t) = Taken By, (c) = Cosigned By    Initials Name Provider Type    AF Solano, Sia, PTA Physical Therapy Assistant        Therapy Charges for Today     Code Description Service Date Service Provider Modifiers Qty    61880321740 HC PT THER PROC EA 15 MIN 4/9/2019 Solano, Sia, PTA GP, KX 1    38961101196 HC PT THERAPEUTIC ACT EA 15 MIN 4/9/2019 Solano, Sia, PTA GP, KX 1          PT G-Codes  Outcome Measure Options: AM-PAC 6 Clicks Basic Mobility (PT)  AM-PAC 6 Clicks Score: 10  Score: 13    Sia Martínez, PTA  4/9/2019

## 2019-04-10 NOTE — DISCHARGE SUMMARY
[facility] Medicine Services  DISCHARGE SUMMARY       Date of Admission: 4/1/2019  Date of Discharge:  4/10/2019  Primary Care Physician: Emerson Sandhu MD    Presenting Problem/History of Present Illness:  Shortness of breath and fall    Final Discharge Diagnoses:  Active Hospital Problems    Diagnosis   • Dysphagia   • MRSA pneumonia (CMS/HCC)   • Cardiac arrest (CMS/HCC), PEA    • Type 2 diabetes mellitus with complication (CMS/HCC), hyperglycemia and peripheral neuropathy    • Beta hemolytic Streptococcus culture positive, bacteremia at OSH    • Acute hypoxemic respiratory failure (CMS/HCC)   • Septic shock        Consults: None    Procedures Performed:   Central line  Intubation  Arterial line  ACLS    Pertinent Test Results:   Imaging Results (last 7 days)     Procedure Component Value Units Date/Time    XR Abdomen KUB [[id number]] Collected:  04/07/19 1535     Updated:  04/07/19 1539    Narrative:       EXAMINATION:  XR ABDOMEN KUB-  4/7/2019 3:32 PM CDT     HISTORY: ABDOMINAL PAIN      COMPARISON: 04/01/2019 abdominal radiograph     TECHNIQUE: Single view: KUB     FINDINGS:      There is moderate gaseous distention of the stomach.     There is stool and gas identified in small and large bowel loops  centrally. There is stool identified distally in the rectum. There are  no significantly dilated bowel loops to indicate obstruction.     There is no organomegaly or definite urinary tract calculi.     The bony structures are intact.       Impression:       1. Gaseous distention of stomach.  2. Otherwise, nonspecific bowel gas pattern without significantly  dilated bowel loops.  This report was finalized on 04/07/2019 15:36 by Dr. Jake Acharya MD.    XR Chest 1 View [[id number]] Collected:  04/07/19 1228     Updated:  04/07/19 1232    Narrative:       EXAMINATION: XR CHEST 1 VW-. 4/7/2019 12:28 PM CDT     CHEST, ONE VIEW:     HISTORY: Follow-up infiltrates      COMPARISON: 04/04/2019     A single frontal chest radiograph was obtained.     FINDINGS:     Patient is extubated. The right IJ deep line and NG tube have been  removed.     There are patchy airspace opacities in both lower lobes particularly on  the left with perihilar interstitial infiltration, pulmonary edema  considered. Infectious/inflammatory etiology/pneumonia also a  differential consideration.                                     Impression:       1. Persistent perihilar and lower lobe interstitial and airspace  opacities. Differential considerations include pulmonary edema.  2. Line and support tubes removed.     This report was finalized on 04/07/2019 12:29 by Dr. Jake Acharya MD.    FL Video Swallow With Speech [[id number]] Collected:  04/05/19 1527     Updated:  04/05/19 1537    Narrative:       EXAMINATION: FL VIDEO SWALLOW W SPEECH-     4/5/2019 3:26 PM CDT     HISTORY: dysphagia; A41.9-Sepsis, unspecified organism; R65.21-Severe  sepsis with septic shock; I46.9-Cardiac arrest, cause unspecified;  R13.12-Dysphagia, oropharyngeal phase; R68.89-Other general symptoms and  signs.     Routine Dysphagia exam under fluoroscopy with the patient seated.   A speech therapist conducted the exam.   The exam was recorded on DVD.     A single fluoroscopic image was captured.  No radiographs were exposed.  Fluoroscopy time = 1.6 minutes.     Honey consistency:  No epiglottic inversion. Prominent vallecular residual.     Repeat honey with chin type:  Improved handling of the bolus though still a large amount of residue  within the vallecula.     Nectar consistency:  Chin tuck positioning. Anterior wall coating of the upper trachea noted  before this swallow as a result of the prominent vallecular residual.  Laryngeal penetration with aspiration of nectar consistency.     Pudding consistency:  Chin tuck positioning. Good handling of the bolus. Moderate persistent  residue within the vallecula.     Repeat pudding  showed the same results with good handling of the bolus.  Prominent persistent residue.      Repeat honey with chin tuck:  Moderate persistent residue. Adequate handling of the bolus.           This report was finalized on 04/05/2019 15:30 by Dr. Richard Santos MD.    XR Chest 1 View [[id number]] Collected:  04/04/19 0728     Updated:  04/04/19 0732    Narrative:       EXAMINATION: XR CHEST 1 VW- 4/4/2019 7:28 AM CDT     HISTORY: intubated; A41.9-Sepsis, unspecified organism; R65.21-Severe  sepsis with septic shock; I46.9-Cardiac arrest, cause unspecified.     REPORT: A frontal view the chest is compared with the previous exam  04/03/2019 0319 hours.     The endotracheal tube appears in satisfactory position with the tip  approximately 4.4 cm superior to the aki. The nasogastric tube and  right internal jugular central line appear to be in good position. Lungs  are hypoaerated, there is central vascular congestion with mild  improvement in pulmonary edema. Heart size is normal. No pneumothorax is  identified. No definite pleural effusion is seen. The osseous structures  are unremarkable.       Impression:       Stable satisfactory position of the life-support lines.  Improvement in pulmonary edema and volume overload is noted. Continued  follow-up is recommended.  This report was finalized on 04/04/2019 07:29 by Dr. Catrachito Hobson MD.    US Venous Doppler Lower Extremity Bilateral (duplex) [[id number]] Collected:  04/03/19 1044     Updated:  04/03/19 1047    Narrative:       History: Swelling       Impression:       Impression: There is no evidence of deep venous thrombosis or  superficial thrombophlebitis of right or left lower extremities.     Comments: Bilateral lower extremity venous duplex exam was performed  using color Doppler flow, Doppler waveform analysis, and grayscale  imaging, with and without compression. There is no evidence of deep  venous thrombosis in the common femoral, superficial femoral,  popliteal,  peroneal, anterior tibial, and posterior tibial veins bilaterally. No  thrombus is identified in the saphenofemoral junctions and greater  saphenous veins bilaterally.         This report was finalized on 04/03/2019 10:44 by Dr. Natanael Ramirez MD.        Lab Results (last 7 days)     Procedure Component Value Units Date/Time    POC Glucose Once [[id number]]  (Abnormal) Collected:  04/10/19 0816    Specimen:  Blood Updated:  04/10/19 0847     Glucose 156 mg/dL      Comment: : [id number] Steve LisaMeter ID: [id number]       Comprehensive Metabolic Panel [[id number]]  (Abnormal) Collected:  04/10/19 0614    Specimen:  Blood Updated:  04/10/19 0708     Glucose 160 mg/dL      BUN 7 mg/dL      Creatinine 0.64 mg/dL      Sodium 133 mmol/L      Potassium 3.1 mmol/L      Chloride 95 mmol/L      CO2 27.0 mmol/L      Calcium 7.4 mg/dL      Total Protein 5.8 g/dL      Albumin 2.50 g/dL      ALT (SGPT) <15 U/L      AST (SGOT) 19 U/L      Alkaline Phosphatase 83 U/L      Total Bilirubin 0.5 mg/dL      eGFR Non African Amer 128 mL/min/1.73      Globulin 3.3 gm/dL      A/G Ratio 0.8 g/dL      BUN/Creatinine Ratio 10.9     Anion Gap 11.0 mmol/L     Narrative:       GFR Normal >60  Chronic Kidney Disease <60  Kidney Failure <15    CBC (No Diff) [[id number]]  (Abnormal) Collected:  04/10/19 0614    Specimen:  Blood Updated:  04/10/19 0651     WBC 10.20 10*3/mm3      RBC 4.04 10*6/mm3      Hemoglobin 9.9 g/dL      Hematocrit 30.8 %      MCV 76.2 fL      MCH 24.5 pg      MCHC 32.1 g/dL      RDW 15.1 %      RDW-SD 41.0 fl      MPV 10.0 fL      Platelets 357 10*3/mm3     POC Glucose Once [[id number]]  (Abnormal) Collected:  04/09/19 2218    Specimen:  Blood Updated:  04/09/19 2238     Glucose 141 mg/dL      Comment: : [id number] Nelly Clark  GMeter ID: [id number]       Clostridium Difficile Toxin - Stool, Per Rectum [[id number]] Collected:  04/09/19 1854    Specimen:  Stool from Per Rectum Updated:  04/09/19 2007     Narrative:       The following orders were created for panel order Clostridium Difficile Toxin - Stool, Per Rectum.  Procedure                               Abnormality         Status                     ---------                               -----------         ------                     Clostridium Difficile To...[[id number]]  Normal              Final result                 Please view results for these tests on the individual orders.    Clostridium Difficile Toxin, PCR - Stool, Per Rectum [[id number]]  (Normal) Collected:  04/09/19 1854    Specimen:  Stool from Per Rectum Updated:  04/09/19 2007     C. Difficile Toxins by PCR Negative    Blood Culture With FAN - Blood, Arm, Left [[id number]] Collected:  04/07/19 1704    Specimen:  Blood from Arm, Left Updated:  04/09/19 1745     Blood Culture No growth at 2 days    Blood Culture With FAN - Blood, Arm, Right [[id number]] Collected:  04/07/19 1704    Specimen:  Blood from Arm, Right Updated:  04/09/19 1745     Blood Culture No growth at 2 days    POC Glucose Once [[id number]]  (Abnormal) Collected:  04/09/19 1654    Specimen:  Blood Updated:  04/09/19 1710     Glucose 152 mg/dL      Comment: : [id number] [email or web address] LisaMeter ID: [id number]       POC Glucose Once [[id number]]  (Abnormal) Collected:  04/09/19 1058    Specimen:  Blood Updated:  04/09/19 1128     Glucose 160 mg/dL      Comment: : [id number] [email or web address] LisaMeter ID: [id number]       POC Glucose Once [[id number]]  (Abnormal) Collected:  04/09/19 0808    Specimen:  Blood Updated:  04/09/19 0827     Glucose 151 mg/dL      Comment: : [id number] [email or web address] LisaMeter ID: [id number]       Comprehensive Metabolic Panel [[id number]]  (Abnormal) Collected:  04/09/19 0619    Specimen:  Blood Updated:  04/09/19 0655     Glucose 145 mg/dL      BUN 8 mg/dL      Creatinine 0.66 mg/dL      Sodium 135 mmol/L      Potassium 3.4 mmol/L      Chloride 96 mmol/L      CO2 28.0 mmol/L      Calcium 7.2 mg/dL      Total Protein 5.7 g/dL       Albumin 2.60 g/dL      ALT (SGPT) <15 U/L      AST (SGOT) 20 U/L      Alkaline Phosphatase 91 U/L      Total Bilirubin 0.6 mg/dL      eGFR Non African Amer 124 mL/min/1.73      Globulin 3.1 gm/dL      A/G Ratio 0.8 g/dL      BUN/Creatinine Ratio 12.1     Anion Gap 11.0 mmol/L     Narrative:       GFR Normal >60  Chronic Kidney Disease <60  Kidney Failure <15    CBC (No Diff) [[id number]]  (Abnormal) Collected:  04/09/19 0619    Specimen:  Blood Updated:  04/09/19 0635     WBC 11.28 10*3/mm3      RBC 4.15 10*6/mm3      Hemoglobin 10.1 g/dL      Hematocrit 31.0 %      MCV 74.7 fL      MCH 24.3 pg      MCHC 32.6 g/dL      RDW 15.2 %      RDW-SD 39.6 fl      MPV 10.3 fL      Platelets 342 10*3/mm3     Vancomycin, Trough [[id number]]  (Normal) Collected:  04/08/19 1714    Specimen:  Blood Updated:  04/08/19 1827     Vancomycin Trough 19.14 mcg/mL     POC Glucose Once [[id number]]  (Normal) Collected:  04/08/19 1626    Specimen:  Blood Updated:  04/08/19 1649     Glucose 117 mg/dL      Comment: : [id number] Steve LisaMeter ID: [id number]       Blood Culture With FAN - Blood, Arm, Right [[id number]] Collected:  04/03/19 1513    Specimen:  Blood from Arm, Right Updated:  04/08/19 1600     Blood Culture No growth at 5 days    POC Glucose Once [[id number]]  (Abnormal) Collected:  04/08/19 1205    Specimen:  Blood Updated:  04/08/19 1225     Glucose 200 mg/dL      Comment: : [id number] Steve LisaMeter ID: [id number]       POC Glucose Once [[id number]]  (Abnormal) Collected:  04/08/19 0814    Specimen:  Blood Updated:  04/08/19 0834     Glucose 163 mg/dL      Comment: : [id number] Steve LisaMeter ID: [id number]       POC Glucose Once [[id number]]  (Abnormal) Collected:  04/08/19 0538    Specimen:  Blood Updated:  04/08/19 0549     Glucose 182 mg/dL      Comment: : [id number] Orlin CadeferMeter ID: [id number]       Procalcitonin [[id number]]  (Abnormal) Collected:  04/08/19 0410    Specimen:  Blood Updated:   04/08/19 0538     Procalcitonin 0.29 ng/mL     Narrative:       SIRS, sepsis, severe sepsis, and septic shock are categorized according to the criteria of the consensus conference of the American College of Chest Physicians/Society of Critical Care Medicine.    PCT < 0.5 ng/mL     Systemic infection (sepsis) is not likely.    PCT >0.5 and < 2.0 ng/mL Systemic infection (sepsis) is possible, but other conditions are known to elevate PCT as well.    PCT > 2.0 ng/mL     Systemic infection (sepsis) is likely, unless other causes are known.      PCT > 10.0 ng/mL    Important systemic inflammatory response, almost exclusively due to severe bacterial sepsis or septic shock.    PCT values of < 0.5 ng/mL do not exclude an infection, because localized infections (without systemic signs) may be associated with such low concentrations, or a systemic infection in its initial stages (<6 hours).  Increased PCT can occur without infection.  PCT concentrations between 0.5 and 2.0 ng/mL should be interpreted taking into account the patients history.  It is recommended to retest PCT within 6-24 hours if any concentrations < 2.0 ng/mL are obtained.    CBC & Differential [[id number]] Collected:  04/08/19 0410    Specimen:  Blood Updated:  04/08/19 0459    Narrative:       The following orders were created for panel order CBC & Differential.  Procedure                               Abnormality         Status                     ---------                               -----------         ------                     CBC Auto Differential[[id number]]        Abnormal            Final result                 Please view results for these tests on the individual orders.    CBC Auto Differential [[id number]]  (Abnormal) Collected:  04/08/19 0410    Specimen:  Blood Updated:  04/08/19 0459     WBC 13.79 10*3/mm3      RBC 4.24 10*6/mm3      Hemoglobin 10.6 g/dL      Hematocrit 32.7 %      MCV 77.1 fL      MCH 25.0 pg      MCHC 32.4 g/dL      RDW 15.3  %      RDW-SD 41.2 fl      MPV 10.6 fL      Platelets 286 10*3/mm3      Neutrophil % 80.6 %      Lymphocyte % 9.4 %      Monocyte % 3.3 %      Eosinophil % 1.7 %      Basophil % 0.6 %      Immature Grans % 4.4 %      Neutrophils, Absolute 11.11 10*3/mm3      Lymphocytes, Absolute 1.29 10*3/mm3      Monocytes, Absolute 0.46 10*3/mm3      Eosinophils, Absolute 0.24 10*3/mm3      Basophils, Absolute 0.08 10*3/mm3      Immature Grans, Absolute 0.61 10*3/mm3      nRBC 0.0 /100 WBC     Urinalysis, Microscopic Only - Urine, Clean Catch [[id number]]  (Abnormal) Collected:  04/08/19 0123    Specimen:  Urine, Clean Catch Updated:  04/08/19 0136     RBC, UA 6-12 /HPF      WBC, UA 0-2 /HPF      Bacteria, UA None Seen /HPF      Squamous Epithelial Cells, UA 0-2 /HPF      Hyaline Casts, UA 0-2 /LPF      Methodology Automated Microscopy    Urinalysis With Culture If Indicated - Urine, Clean Catch [[id number]]  (Abnormal) Collected:  04/08/19 0123    Specimen:  Urine, Clean Catch Updated:  04/08/19 0136     Color, UA Yellow     Appearance, UA Clear     pH, UA 7.5     Specific Gravity, UA 1.017     Glucose,  mg/dL (1+)     Ketones, UA Negative     Bilirubin, UA Negative     Blood, UA Trace     Protein,  mg/dL (2+)     Leuk Esterase, UA Negative     Nitrite, UA Negative     Urobilinogen, UA 0.2 E.U./dL    POC Glucose Once [[id number]]  (Abnormal) Collected:  04/08/19 0054    Specimen:  Blood Updated:  04/08/19 0104     Glucose 174 mg/dL      Comment: : [id number] Orlin CadeferMeter ID: [id number]       POC Glucose Once [[id number]]  (Abnormal) Collected:  04/07/19 1644    Specimen:  Blood Updated:  04/07/19 1654     Glucose 146 mg/dL      Comment: : [id number] Jael JefferseeMeter ID: [id number]       POC Glucose Once [[id number]]  (Abnormal) Collected:  04/07/19 1554    Specimen:  Blood Updated:  04/07/19 1605     Glucose 149 mg/dL      Comment: : [id number] Jael KaleeMeter ID: [id number]       POC  Glucose Once [[id number]]  (Abnormal) Collected:  04/07/19 1230    Specimen:  Blood Updated:  04/07/19 1243     Glucose 204 mg/dL      Comment: : [id number] Claire AutumnMeter ID: [id number]       Vancomycin, Trough [[id number]]  (Abnormal) Collected:  04/07/19 0726    Specimen:  Blood Updated:  04/07/19 0803     Vancomycin Trough 7.41 mcg/mL     POC Glucose Once [[id number]]  (Abnormal) Collected:  04/07/19 0537    Specimen:  Blood Updated:  04/07/19 0548     Glucose 170 mg/dL      Comment: : [id number] Orlin CadeferMeter ID: [id number]       Basic Metabolic Panel [[id number]]  (Abnormal) Collected:  04/07/19 0254    Specimen:  Blood Updated:  04/07/19 0332     Glucose 168 mg/dL      BUN 11 mg/dL      Creatinine 0.65 mg/dL      Sodium 138 mmol/L      Potassium 3.4 mmol/L      Chloride 96 mmol/L      CO2 36.0 mmol/L      Calcium 7.2 mg/dL      eGFR Non African Amer 126 mL/min/1.73      BUN/Creatinine Ratio 16.9     Anion Gap 6.0 mmol/L     Narrative:       GFR Normal >60  Chronic Kidney Disease <60  Kidney Failure <15    CBC (No Diff) [[id number]]  (Abnormal) Collected:  04/07/19 0254    Specimen:  Blood Updated:  04/07/19 0311     WBC 14.33 10*3/mm3      RBC 4.30 10*6/mm3      Hemoglobin 10.6 g/dL      Hematocrit 32.5 %      MCV 75.6 fL      MCH 24.7 pg      MCHC 32.6 g/dL      RDW 15.0 %      RDW-SD 41.1 fl      MPV 9.9 fL      Platelets 246 10*3/mm3     POC Glucose Once [[id number]]  (Abnormal) Collected:  04/06/19 2352    Specimen:  Blood Updated:  04/07/19 0006     Glucose 167 mg/dL      Comment: : [id number] Leónchristin TerryMeter ID: [id number]       POC Glucose Once [[id number]]  (Abnormal) Collected:  04/06/19 1720    Specimen:  Blood Updated:  04/06/19 1731     Glucose 184 mg/dL      Comment: : [id number] Jael KaleeMeter ID: [id number]       Blood Culture - Blood, Hand, Right [[id number]] Collected:  04/01/19 1625    Specimen:  Blood from Hand, Right Updated:  04/06/19 1700     Blood Culture No  growth at 5 days    Blood Culture - Blood, Hand, Left [[id number]] Collected:  04/01/19 1625    Specimen:  Blood from Hand, Left Updated:  04/06/19 1700     Blood Culture No growth at 5 days    POC Glucose Once [[id number]]  (Abnormal) Collected:  04/06/19 1138    Specimen:  Blood Updated:  04/06/19 1150     Glucose 234 mg/dL      Comment: : [id number] Jael KaleeMeter ID: [id number]       POC Glucose Once [[id number]]  (Abnormal) Collected:  04/06/19 0555    Specimen:  Blood Updated:  04/06/19 0606     Glucose 193 mg/dL      Comment: : [id number] Crenshaw ThucMeter ID: [id number]       Basic Metabolic Panel [[id number]]  (Abnormal) Collected:  04/06/19 0410    Specimen:  Blood Updated:  04/06/19 0433     Glucose 203 mg/dL      BUN 13 mg/dL      Creatinine 0.56 mg/dL      Sodium 140 mmol/L      Potassium 3.2 mmol/L      Chloride 98 mmol/L      CO2 31.0 mmol/L      Calcium 7.4 mg/dL      eGFR Non African Amer 149 mL/min/1.73      BUN/Creatinine Ratio 23.2     Anion Gap 11.0 mmol/L     Narrative:       GFR Normal >60  Chronic Kidney Disease <60  Kidney Failure <15    CBC (No Diff) [[id number]]  (Abnormal) Collected:  04/06/19 0410    Specimen:  Blood Updated:  04/06/19 0424     WBC 12.20 10*3/mm3      RBC 4.55 10*6/mm3      Hemoglobin 11.1 g/dL      Hematocrit 34.6 %      MCV 76.0 fL      MCH 24.4 pg      MCHC 32.1 g/dL      RDW 15.1 %      RDW-SD 41.5 fl      MPV 9.9 fL      Platelets 258 10*3/mm3     POC Glucose Once [[id number]]  (Abnormal) Collected:  04/05/19 2343    Specimen:  Blood Updated:  04/05/19 2354     Glucose 200 mg/dL      Comment: : [id number] Crenshaw ThucMeter ID: [id number]       POC Glucose Once [[id number]]  (Abnormal) Collected:  04/05/19 1955    Specimen:  Blood Updated:  04/05/19 2007     Glucose 179 mg/dL      Comment: : [id number] Roth ID: [id number]       POC Glucose Once [[id number]]  (Abnormal) Collected:  04/05/19 1554    Specimen:  Blood Updated:  04/05/19 1623      Glucose 215 mg/dL      Comment: : [id number] Catrachito Marquez ID: [id number]       Magnesium [[id number]]  (Normal) Collected:  04/05/19 1550    Specimen:  Blood Updated:  04/05/19 1611     Magnesium 1.7 mg/dL     Basic Metabolic Panel [[id number]]  (Abnormal) Collected:  04/05/19 1550    Specimen:  Blood Updated:  04/05/19 1611     Glucose 235 mg/dL      BUN 17 mg/dL      Creatinine 0.67 mg/dL      Sodium 140 mmol/L      Potassium 3.1 mmol/L      Chloride 97 mmol/L      CO2 34.0 mmol/L      Calcium 7.8 mg/dL      eGFR Non African Amer 121 mL/min/1.73      BUN/Creatinine Ratio 25.4     Anion Gap 9.0 mmol/L     Narrative:       GFR Normal >60  Chronic Kidney Disease <60  Kidney Failure <15    Comprehensive Metabolic Panel [[id number]]  (Abnormal) Collected:  04/05/19 0740    Specimen:  Blood Updated:  04/05/19 0801     Glucose 177 mg/dL      BUN 19 mg/dL      Creatinine 0.56 mg/dL      Sodium 144 mmol/L      Potassium 2.7 mmol/L      Chloride 101 mmol/L      CO2 34.0 mmol/L      Calcium 7.7 mg/dL      Total Protein 5.8 g/dL      Albumin 2.80 g/dL      ALT (SGPT) 39 U/L      AST (SGOT) 27 U/L      Alkaline Phosphatase 121 U/L      Total Bilirubin 0.8 mg/dL      eGFR Non African Amer 149 mL/min/1.73      Globulin 3.0 gm/dL      A/G Ratio 0.9 g/dL      BUN/Creatinine Ratio 33.9     Anion Gap 9.0 mmol/L     Narrative:       GFR Normal >60  Chronic Kidney Disease <60  Kidney Failure <15    CBC (No Diff) [[id number]]  (Abnormal) Collected:  04/05/19 0740    Specimen:  Blood Updated:  04/05/19 0750     WBC 8.43 10*3/mm3      RBC 4.46 10*6/mm3      Hemoglobin 11.0 g/dL      Hematocrit 33.9 %      MCV 76.0 fL      MCH 24.7 pg      MCHC 32.4 g/dL      RDW 15.3 %      RDW-SD 42.5 fl      MPV 10.1 fL      Platelets 214 10*3/mm3     Respiratory Culture - Sputum, Cough [[id number]]  (Abnormal)  (Susceptibility) Collected:  04/01/19 1743    Specimen:  Sputum from Cough Updated:  04/05/19 0658     Respiratory Culture Heavy growth  (4+) Normal Respiratory Twan      Moderate growth (3+) Staphylococcus aureus, MRSA     Comment:   Methicillin resistant Staphylococcus aureus, Patient may be an isolation risk.        Gram Stain Greater than 25 WBCs per low power field      Many (4+) Mixed gram positive twan      Few (2+) Epithelial cells seen    Susceptibility      Staphylococcus aureus, MRSA     ERIK     Clindamycin Susceptible     Erythromycin Susceptible     Gentamicin Susceptible     Inducible Clindamycin Resistance Negative     Levofloxacin Susceptible [1]      Oxacillin Resistant     Penicillin G Resistant     Tetracycline Susceptible     Trimethoprim + Sulfamethoxazole Susceptible     Vancomycin Susceptible            [1]   Staphylococcus species may develop resistance during prolonged therapy with quinolones. Isolates that are initially susceptible may become resistant within three to four days after initiation of therapy. Testing of repeat isolates may be warranted.              Susceptibility Comments     Staphylococcus aureus, MRSA    This isolate does not demonstrate inducible clindamycin resistance in vitro.               POC Glucose Once [[id number]]  (Abnormal) Collected:  04/05/19 0611    Specimen:  Blood Updated:  04/05/19 0623     Glucose 183 mg/dL      Comment: : [id number] Branmercedes AlisynMeter ID: [id number]       POC Glucose Once [[id number]]  (Abnormal) Collected:  04/05/19 0008    Specimen:  Blood Updated:  04/05/19 0029     Glucose 204 mg/dL      Comment: : [id number] Brann AlisynMeter ID: [id number]       POC Glucose Once [[id number]]  (Abnormal) Collected:  04/04/19 1313    Specimen:  Blood Updated:  04/04/19 1324     Glucose 255 mg/dL      Comment: : [id number] Devon Perez ID: [id number]       POC Glucose Once [[id number]]  (Abnormal) Collected:  04/04/19 0626    Specimen:  Blood Updated:  04/04/19 0637     Glucose 216 mg/dL      Comment: : [id number] Josesito Bertrand ID: [id number]        Vancomycin, Random [[id number]]  (Normal) Collected:  04/04/19 0257    Specimen:  Blood Updated:  04/04/19 0337     Vancomycin Random 8.05 mcg/mL     Blood Gas, Arterial [[id number]]  (Abnormal) Collected:  04/04/19 0304    Specimen:  Arterial Blood Updated:  04/04/19 0309     Site Arterial Line     Shawn's Test N/A     pH, Arterial 7.522 pH units      Comment: 83 Value above reference range        pCO2, Arterial 38.6 mm Hg      pO2, Arterial 123.0 mm Hg      Comment: 83 Value above reference range        HCO3, Arterial 31.6 mmol/L      Comment: 83 Value above reference range        Base Excess, Arterial 8.2 mmol/L      Comment: 83 Value above reference range        O2 Saturation, Arterial 99.4 %      Comment: 83 Value above reference range        Temperature 37.0 C      Barometric Pressure for Blood Gas 755 mmHg      Modality Ventilator     FIO2 30 %      Ventilator Mode AC     Set Tidal Volume 550     Set Mech Resp Rate 14.0     PEEP 5.0     Collected by [id number]     Comment: Meter: [id number]     :  [id number]       POC Glucose Once [[id number]]  (Abnormal) Collected:  04/03/19 2329    Specimen:  Blood Updated:  04/03/19 2340     Glucose 237 mg/dL      Comment: : [id number] Josesito MCGRAWeter ID: [id number]       POC Glucose Once [[id number]]  (Abnormal) Collected:  04/03/19 2050    Specimen:  Blood Updated:  04/03/19 2112     Glucose 230 mg/dL      Comment: : [id number] Jude LundyraMeter ID: [id number]       POC Glucose Once [[id number]]  (Abnormal) Collected:  04/03/19 1449    Specimen:  Blood Updated:  04/03/19 1501     Glucose 262 mg/dL      Comment: : [id number] Jesús Castroer ID: [id number]       Urine Culture - Urine, Urine, Catheter [[id number]]  (Normal) Collected:  04/01/19 1933    Specimen:  Urine, Catheter Updated:  04/03/19 0942     Urine Culture No growth at 2 days        Hospital Course:  The patient is a 59 y.o. male who presented to [facility] with shortness of  breath and fall.  Patient presented as a transfer from an outside hospital.  Patient was unable to significantly contribute to the history and physical upon arrival.  Patient was found to be hypotensive and noted to have a pneumonia.  Patient went H fibrillation with rapid ventricular response.  At the outside hospital, the patient was given ceftriaxone 1 g and azithromycin 500 mg.  Patient was started on a Cardizem drip, and given 1 mg/kilogram of Lovenox.  Upon arrival to our hospital, the patient was noted to have a temperature of 100.6, and a blood pressure of 90/53.    On the day of arrival, the patient was very lethargic, but alert and awake, and protecting his airway.  His O2 was remaining greater than 92%.  Central line was placed due to vasopressor needs.  While physician and RN were in room, patient sighed audibly and stopped breathing and lost his pulse.  His O2 sat went from 89% to undetectable.  ACLS was initiated with chest compressions and epinephrine.  Patient was intubated emergently.  ROSC was obtained very quickly.  Arterial line was placed.    Patient remained intubated until 4/4.      Patient was noted to have a pneumonia, his sputum culture grew back MRSA.  Blood cultures from outside hospital, grew beta-hemolytic strep.  Patient was treated with vancomycin, and will need a total of 14 days of vancomycin.    For his atrial fibrillation, patient did not respond initially to beta-blockers, patient was started on amiodarone, we will continue this at 200 mg daily.  Patient is on apixaban for anticoagulation.  He is also receiving metoprolol 25 mg.  His primary care doctor can consider coming off of the amiodarone in a few weeks if patient remains well controlled.    Patient noted to have some dysphasia, speech-language pathology has been evaluating him, he is currently on a dysphagia diet with thickened liquids and puréed foods.  He can continue to work with speech at the nursing facility and  "consider upping his diet as they see fit.  Patient would benefit from ongoing physical therapy and occupational therapy.    Echocardiogram was performed, it showed difficult study to interpret, had moderate dilation of the aortic root, and grade 2 diastolic dysfunction.  Left ventricular systolic function was only mildly reduced.    Patient was having some diarrhea, he does have some diarrhea at baseline, but this was a little bit worse than his normal.  Given that his recent course of antibiotics, C. difficile was checked, and found to be negative.    Physical Exam on Discharge:  /65 (BP Location: Left arm, Patient Position: Lying)   Pulse 85   Temp 99.5 °F (37.5 °C) (Oral)   Resp 18   Ht 165.1 cm (65\")   Wt 79.5 kg (175 lb 4.3 oz)   SpO2 98%   BMI 29.17 kg/m²   Physical Exam  Constitutional: No distress.   HENT:   Head: Normocephalic and atraumatic.   Eyes: Conjunctivae are normal. No scleral icterus.   Neck: No JVD present.   Cardiovascular: Normal rate and regular rhythm. Exam reveals no gallop and no friction rub.   No murmur heard.  Pulmonary/Chest: Effort normal and breath sounds normal. No respiratory distress. He has no wheezes. He has no rales.   Abdominal: Soft. Bowel sounds are normal. He exhibits no distension and no mass. There is no tenderness. There is no rebound and no guarding. No hernia.   Musculoskeletal: He exhibits no edema.   Neurological: He is alert. He is alert and oriented x 3  Skin: Skin is warm and dry. He is not diaphoretic. No erythema.   Psychiatric: He has a normal mood and affect. His behavior is normal.   Nursing note and vitals reviewed.        Condition on Discharge: Stable     Discharge Disposition:  Skilled Nursing Facility (DC - External)    Discharge Medications:     Discharge Medications      New Medications      Instructions Start Date   amiodarone 200 MG tablet  Commonly known as:  PACERONE   200 mg, Oral, Daily      apixaban 5 MG tablet tablet  Commonly known " as:  ELIQUIS   5 mg, Oral, Every 12 Hours Scheduled      lidocaine-Maalox-diphenhydramine 900 mL suspension  Commonly known as:  MIRACLE MOUTHWASH   30 mL, Swish & Spit, 4 Times Daily PRN      metFORMIN 500 MG tablet  Commonly known as:  GLUCOPHAGE   500 mg, Oral, 2 Times Daily With Meals      metoprolol tartrate 25 MG tablet  Commonly known as:  LOPRESSOR   25 mg, Oral, Every 12 Hours Scheduled      vancomycin   1,250 mg, Intravenous, Every 12 Hours         Continue These Medications      Instructions Start Date   acetaminophen-codeine 300-60 MG per tablet  Commonly known as:  TYLENOL with CODEINE #4   1 tablet, Oral, Every 8 Hours PRN      ARIPiprazole 5 MG tablet  Commonly known as:  ABILIFY   5 mg, Oral, Daily      busPIRone 10 MG tablet  Commonly known as:  BUSPAR   10 mg, Oral, 2 Times Daily      celecoxib 200 MG capsule  Commonly known as:  CeleBREX   200 mg, Oral, 2 Times Daily      cyclobenzaprine 5 MG tablet  Commonly known as:  FLEXERIL   5 mg, Oral, 3 Times Daily PRN      diazePAM 5 MG tablet  Commonly known as:  VALIUM   5 mg, Oral, 2 Times Daily      diphenoxylate-atropine 2.5-0.025 MG per tablet  Commonly known as:  LOMOTIL   1 tablet, Oral, Every 8 Hours PRN      fluticasone 50 MCG/ACT nasal spray  Commonly known as:  FLONASE   2 sprays, Nasal, Daily      furosemide 20 MG tablet  Commonly known as:  LASIX   20 mg, Oral, Daily PRN      gabapentin 300 MG capsule  Commonly known as:  NEURONTIN   300 mg, Oral, 2 Times Daily      levothyroxine 25 MCG tablet  Commonly known as:  SYNTHROID, LEVOTHROID   25 mcg, Oral, Daily      lisinopril 40 MG tablet  Commonly known as:  PRINIVIL,ZESTRIL   40 mg, Oral, Daily      PARoxetine 10 MG tablet  Commonly known as:  PAXIL   10 mg, Oral, Every Morning      potassium chloride 20 MEQ CR tablet  Commonly known as:  K-DUR,KLOR-CON   20 mEq, Oral, Daily      promethazine 12.5 MG tablet  Commonly known as:  PHENERGAN   12.5 mg, Oral, Every 6 Hours PRN           Discharge  Diet:   Diet Instructions     Diet: Consistent Carbohydrate, Dysphagia; Honey Thick Liquids; Pureed; Honey Thick      Discharge Diet:   Consistent Carbohydrate  Dysphagia       Fluid Consistency:  Honey Thick Liquids    Pureed Options:  Pureed    Fluid Consistency:  Honey Thick    Boost or equivalent pudding BID between meals.        Activity at Discharge:   Activity Instructions     Activity as Tolerated            Follow-up Appointments:   No future appointments.    Test Results Pending at Discharge: None    Jadon Burnett MD  04/10/19  9:21 AM    Time: 45 minutes.

## 2019-04-10 NOTE — PLAN OF CARE
Problem: Patient Care Overview  Goal: Plan of Care Review  Outcome: Ongoing (interventions implemented as appropriate)   04/10/19 2612   Coping/Psychosocial   Plan of Care Reviewed With patient   OTHER   Outcome Summary Pt. progressing fairly with his ot tasks, tactile/vc's required for reinforcement!e

## 2019-04-10 NOTE — PLAN OF CARE
Problem: Patient Care Overview  Goal: Plan of Care Review  Outcome: Ongoing (interventions implemented as appropriate)   04/10/19 0331   Coping/Psychosocial   Plan of Care Reviewed With patient   Plan of Care Review   Progress improving   OTHER   Outcome Summary Pt more aleart , pt turns self , pt incont urine cont to monitor . saknia liq and po meds

## 2019-04-10 NOTE — DISCHARGE PLACEMENT REQUEST
"From:  Marianela Sheridan, BSW  [phone]    Vitaly Curran (59 y.o. Male)     Date of Birth Social Security Number Address Home Phone MRN    1960  [facility]  [address] [place] [address] [phone] [id number]    Church Marital Status          [facility] Single       Admission Date Admission Type Admitting Provider Attending Provider Department, Room/Bed    4/1/19 Urgent Jadon Burnett MD Fleming, John Eric, MD [facility] 4B, 401/1    Discharge Date Discharge Disposition Discharge Destination         Skilled Nursing Facility (DC - External)              Attending Provider:  Jadon Burnett MD    Allergies:  Compazine [Prochlorperazine Edisylate], Hydrocodone, Oxycodone, Relafen [Nabumetone], Thorazine [Chlorpromazine]    Isolation:  Contact Drop   Infection:  MRSA (04/04/19)   Code Status:  CPR    Ht:  165.1 cm (65\")   Wt:  79.5 kg (175 lb 4.3 oz)    Admission Cmt:  None   Principal Problem:  None                Active Insurance as of 4/1/2019     Primary Coverage     Payor Plan Insurance Group Employer/Plan Group    MEDICARE MEDICARE A & B      Payor Plan Address Payor Plan Phone Number Payor Plan Fax Number Effective Dates    [address] [phone]  9/1/1997 - None Entered    [facility] [id number]       Subscriber Name Subscriber Birth Date Member ID       VITALY CURRAN 1960 [id number]           Secondary Coverage     Payor Plan Insurance Group Employer/Plan Group    [place]      Payor Plan Address Payor Plan Phone Number Payor Plan Fax Number Effective Dates    [address] [address]   1/1/2014 - None Entered    [place] [address]       Subscriber Name Subscriber Birth Date Member ID       VITALY CURRAN 1960 [id number]                 Emergency Contacts      (Rel.) Home Phone Work Phone Mobile Phone    Jin Curran (Other) [phone] -- --    Catrachito Curran (Brother) -- -- [phone]    "

## 2019-04-10 NOTE — PLAN OF CARE
Problem: Patient Care Overview  Goal: Plan of Care Review  Outcome: Ongoing (interventions implemented as appropriate)      Problem: Fall Risk (Adult)  Goal: Absence of Fall  Outcome: Ongoing (interventions implemented as appropriate)      Problem: Sepsis/Septic Shock (Adult)  Goal: Signs and Symptoms of Listed Potential Problems Will be Absent, Minimized or Managed (Sepsis/Septic Shock)  Outcome: Ongoing (interventions implemented as appropriate)      Problem: Nutrition, Imbalanced: Inadequate Oral Intake (Adult)  Goal: Improved Oral Intake  Outcome: Ongoing (interventions implemented as appropriate)    Goal: Prevent Further Weight Loss  Outcome: Ongoing (interventions implemented as appropriate)      Problem: Dysphagia (Adult)  Goal: Functional/Safe Swallow  Outcome: Ongoing (interventions implemented as appropriate)

## 2019-04-10 NOTE — PROGRESS NOTES
Continued Stay Note   [place]     Patient Name: Vitaly Pantoja  MRN: [id number]  Today's Date: 4/10/2019    Admit Date: 4/1/2019    Discharge Plan     Row Name 04/10/19 0957       Plan    Patient/Family in Agreement with Plan  yes    Final Discharge Disposition Code  03 - skilled nursing facility (SNF)    Final Note  Chucky from [facility] has confirmed they can do the [place] trough at 4:00PM.  Bharati is on standby and face sheet has been faxed.  [facility] Phone:  [phone].  [facility] Phone:  [phone], fax: [phone].  CECILIA Lucas.        Discharge Codes    No documentation.       Expected Discharge Date and Time     Expected Discharge Date Expected Discharge Time    Apr 10, 2019             CECILIA Chan

## 2019-04-11 NOTE — THERAPY DISCHARGE NOTE
Acute Care - Physical Therapy Discharge Summary  [facility]       Patient Name: Vitaly Pantoja  : 1960  MRN: [id number]    Today's Date: 2019  Onset of Illness/Injury or Date of Surgery: 19    Date of Referral to PT: 19  Referring Physician: Dr. Milligan      Admit Date: 2019      PT Recommendation and Plan    Visit Dx:    ICD-10-CM ICD-9-CM   1. Shock, septic (CMS/[facility]) A41.9 038.9    R65.21 785.52     995.92   2. Cardiac arrest (CMS/[facility]) I46.9 427.5   3. Oropharyngeal dysphagia R13.12 787.22   4. Decreased activities of daily living (ADL) R68.89 780.99   5. Impaired mobility Z74.09 799.89       Outcome Measures     Row Name 04/10/19 1350 19 1500 19 1327       How much help from another person do you currently need...    Turning from your back to your side while in flat bed without using bedrails?  --  --  2  -AF    Moving from lying on back to sitting on the side of a flat bed without bedrails?  --  --  2  -AF    Moving to and from a bed to a chair (including a wheelchair)?  --  --  2  -AF    Standing up from a chair using your arms (e.g., wheelchair, bedside chair)?  --  --  2  -AF    Climbing 3-5 steps with a railing?  --  --  1  -AF    To walk in hospital room?  --  --  1  -AF    AM-PAC 6 Clicks Score  --  --  10  -AF       How much help from another is currently needed...    Putting on and taking off regular lower body clothing?  2  -CJ  2  -MW  --    Bathing (including washing, rinsing, and drying)  2  -CJ  2  -MW  --    Toileting (which includes using toilet bed pan or urinal)  2  -CJ  2  -MW  --    Putting on and taking off regular upper body clothing  2  -CJ  2  -MW  --    Taking care of personal grooming (such as brushing teeth)  2  -CJ  2  -MW  --    Eating meals  3  -CJ  3  -MW  --    Score  13  -  13  -MW  --       Functional Assessment    Outcome Measure Options  AM-PAC 6 Clicks Daily Activity (OT)  -CJ  AM-PAC 6 Clicks Daily Activity (OT)  -  AM-PAC 6  Clicks Basic Mobility (PT)  -AF      User Key  (r) = Recorded By, (t) = Taken By, (c) = Cosigned By    Initials Name Provider Type    CJ Gume Acharya, LEDBETTER/L Occupational Therapy Assistant    MW Lay Florentino, OTR/L Occupational Therapist    AF Sia Martínez, PTA Physical Therapy Assistant              Rehab Goal Summary     Row Name 04/11/19 1552 04/11/19 1500 04/11/19 0700       Physical Therapy Goals    Bed Mobility Goal Selection (PT)  bed mobility, PT goal 1  -AH  --  --    Transfer Goal Selection (PT)  transfer, PT goal 1  -AH  --  --    Gait Training Goal Selection (PT)  gait training, PT goal 1  -AH  --  --    Balance Goal Selection (PT)  balance, PT goal 1  -AH  --  --       Bed Mobility Goal 1 (PT)    Activity/Assistive Device (Bed Mobility Goal 1, PT)  bed mobility activities, all  -AH  --  --    [place] Level/Cues Needed (Bed Mobility Goal 1, PT)  minimum assist (75% or more patient effort);1 person assist  -AH  --  --    Time Frame (Bed Mobility Goal 1, PT)  long term goal (LTG);10 days  -AH  --  --    Progress/Outcomes (Bed Mobility Goal 1, PT)  goal not met  -AH  --  --       Transfer Goal 1 (PT)    Activity/Assistive Device (Transfer Goal 1, PT)  transfers, all  -AH  --  --    [place] Level/Cues Needed (Transfer Goal 1, PT)  moderate assist (50-74% patient effort);1 person assist  -AH  --  --    Time Frame (Transfer Goal 1, PT)  long term goal (LTG);10 days  -AH  --  --    Progress/Outcome (Transfer Goal 1, PT)  goal not met  -AH  --  --       Gait Training Goal 1 (PT)    Activity/Assistive Device (Gait Training Goal 1, PT)  gait (walking locomotion);decrease fall risk;increase endurance/gait distance;increase energy conservation  -AH  --  --    [place] Level (Gait Training Goal 1, PT)  moderate assist (50-74% patient effort);1 person assist  -AH  --  --    Distance (Gait Goal 1, PT)  ~5 feet to assist with t/f to chair  -AH  --  --    Time Frame (Gait Training Goal 1, PT)   long term goal (LTG);10 days  -  --  --    Progress/Outcome (Gait Training Goal 1, PT)  goal not met  -  --  --       Balance Goal 1 (PT)    Activity/Assistive Device (Balance Goal 1, PT)  sitting, static;sitting, dynamic;standing, static;standing, dynamic;walker, rolling  -  --  --    [place] Level/Cues Needed (Balance Goal 1, PT)  moderate assist (50-74% patient effort);1 person assist  -  --  --    Time Frame (Balance Goal 1, PT)  long term goal (LTG);10 days  -  --  --    Progress/Outcomes (Balance Goal 1, PT)  goal not met  -  --  --       Dressing Goal 1 (OT)    Activity/Assistive Device (Dressing Goal 1, OT)  --  --  lower body dressing  -TS    [place]/Cues Needed (Dressing Goal 1, OT)  --  --  minimum assist (75% or more patient effort)  -TS    Time Frame (Dressing Goal 1, OT)  --  --  long term goal (LTG);10 days  -TS    Progress/Outcome (Dressing Goal 1, OT)  --  --  goal not met  -TS       Strength Goal 1 (OT)    Strength Goal 1 (OT)  --  --  pt will increase B UE strength to 4+/5 to increase independence with ADL  -TS    Time Frame (Strength Goal 1, OT)  --  --  long term goal (LTG);10 days  -TS    Progress/Outcome (Strength Goal 1, OT)  --  --  goal not met  -TS        Endurance Goal 1 (OT)    Activity Level (Endurance Goal 1, OT)  --  --  endurance 2 fair +  -TS    Time Frame (Endurance Goal 1, OT)  --  --  long term goal (LTG);10 days  -TS    Progress/Outcome (Endurance Goal 1, OT)  --  --  goal not met  -TS       Oral Nutrition/Hydration Goal 1 (SLP)    Oral Nutrition/Hydration Goal 1, SLP  --  Pt will tolerate least restrictive diet with no overt s/s of aspiration.  -MB  --    Time Frame (Oral Nutrition/Hydration Goal 1, SLP)  --  by discharge  -MB  --    Barriers (Oral Nutrition/Hydration Goal 1, SLP)  --  Cognition  -MB  --    Progress/Outcomes (Oral Nutrition/Hydration Goal 1, SLP)  --  goal partially met  -MB  --       Pharyngeal Strengthening Exercise Goal 1 (SLP)     Activity (Pharyngeal Strengthening Goal 1, SLP)  --  increase superior movement of the hyolaryngeal complex;increase anterior movement of the hyolaryngeal complex;increase squeeze/positive pressure generation  -MB  --    Increase Superior Movement of the Hyolaryngeal Complex  --  Mendelsohn NMES  -MB  --    Increase Anterior Movement of the Hyolaryngeal Complex  --  shaker  -MB  --    Increase Squeeze/Positive Pressure Generation  --  hard effortful swallow;marsha  -MB  --    [place]/Accuracy (Pharyngeal Strengthening Goal 1, SLP)  --  with minimal cues (75-90% accuracy)  -MB  --    Time Frame (Pharyngeal Strengthening Goal 1, SLP)  --  short term goal (STG);by discharge  -MB  --    Barriers (Pharyngeal Strengthening Goal 1, SLP)  --  Cognition  -MB  --    Progress/Outcomes (Pharyngeal Strengthening Goal 1, SLP)  --  goal partially met  -MB  --      User Key  (r) = Recorded By, (t) = Taken By, (c) = Cosigned By    Initials Name Provider Type Discipline    Julia Saenz, CCC-SLP Speech and Language Pathologist SLP    Meliza Mckeon PTA Physical Therapy Assistant PT    TS Amy Meyers, LEDBETTER/L Occupational Therapy Assistant OT              PT Discharge Summary  Reason for Discharge: Discharge from facility  Outcomes Achieved: Refer to plan of care for updates on goals achieved  Discharge Destination: [facility]      Meliza Gomez PTA   4/11/2019

## 2019-04-11 NOTE — THERAPY DISCHARGE NOTE
Acute Care - Speech Language Pathology Discharge Summary  [facility]       Patient Name: Vitaly Pantoja  : 1960  MRN: [id number]    Today's Date: 2019  Onset of Illness/Injury or Date of Surgery: 19       Referring Physician: Dr. Milligan        Admit Date: 2019      SLP Recommendation and Plan  Pureed diet and honey thick liquids    Visit Dx:    ICD-10-CM ICD-9-CM   1. Shock, septic (CMS/[facility]) A41.9 038.9    R65.21 785.52     995.92   2. Cardiac arrest (CMS/[facility]) I46.9 427.5   3. Oropharyngeal dysphagia R13.12 787.22   4. Decreased activities of daily living (ADL) R68.89 780.99   5. Impaired mobility Z74.09 799.89               SLP GOALS     Row Name 19 1500 19 1322          Oral Nutrition/Hydration Goal 1 (SLP)    Oral Nutrition/Hydration Goal 1, SLP  Pt will tolerate least restrictive diet with no overt s/s of aspiration.  -MB  Pt will tolerate least restrictive diet with no overt s/s of aspiration.  -MB     Time Frame (Oral Nutrition/Hydration Goal 1, SLP)  by discharge  -MB  by discharge  -MB     Barriers (Oral Nutrition/Hydration Goal 1, SLP)  Cognition  -MB  Cognition  -MB     Progress/Outcomes (Oral Nutrition/Hydration Goal 1, SLP)  goal partially met  -MB  continuing progress toward goal  -MB        Pharyngeal Strengthening Exercise Goal 1 (SLP)    Activity (Pharyngeal Strengthening Goal 1, SLP)  increase superior movement of the hyolaryngeal complex;increase anterior movement of the hyolaryngeal complex;increase squeeze/positive pressure generation  -MB  increase superior movement of the hyolaryngeal complex;increase anterior movement of the hyolaryngeal complex;increase squeeze/positive pressure generation  -MB     Increase Superior Movement of the Hyolaryngeal Complex  Mendelsohn NMES  -MB  Mendelsohn NMES  -MB     Increase Anterior Movement of the Hyolaryngeal Complex  shaker  -MB  shaker  -MB     Increase Squeeze/Positive Pressure Generation  hard effortful  swallow;marsha  -MB  hard effortful swallow;marsha  -MB     [place]/Accuracy (Pharyngeal Strengthening Goal 1, SLP)  with minimal cues (75-90% accuracy)  -MB  with minimal cues (75-90% accuracy)  -MB     Time Frame (Pharyngeal Strengthening Goal 1, SLP)  short term goal (STG);by discharge  -MB  short term goal (STG);by discharge  -MB     Barriers (Pharyngeal Strengthening Goal 1, SLP)  Cognition  -MB  Cognition  -MB     Progress/Outcomes (Pharyngeal Strengthening Goal 1, SLP)  goal partially met  -MB  continuing progress toward goal  -MB       User Key  (r) = Recorded By, (t) = Taken By, (c) = Cosigned By    Initials Name Provider Type    Julia Saenz CCC-SLP Speech and Language Pathologist                  SLP Discharge Summary  Anticipated Dischage Disposition: skilled nursing facility  Reason for Discharge: discharge from this facility  Progress Toward Achieving Short/long Term Goals: goals partially met within established timelines  Discharge Destination: SNF      Julia Nevarez CCC-SLP  4/11/2019

## 2019-04-11 NOTE — THERAPY DISCHARGE NOTE
Acute Care - Occupational Therapy Discharge Summary  [facility]     Patient Name: Vitaly Pantoja  : 1960  MRN: [id number]    Today's Date: 2019  Onset of Illness/Injury or Date of Surgery: 19    Date of Referral to OT: 19  Referring Physician: Dr. Milligan      Admit Date: 2019        OT Recommendation and Plan    Visit Dx:    ICD-10-CM ICD-9-CM   1. Shock, septic (CMS/[facility]) A41.9 038.9    R65.21 785.52     995.92   2. Cardiac arrest (CMS/[facility]) I46.9 427.5   3. Oropharyngeal dysphagia R13.12 787.22   4. Decreased activities of daily living (ADL) R68.89 780.99   5. Impaired mobility Z74.09 799.89               Rehab Goal Summary     Row Name 19 0700             Dressing Goal 1 (OT)    Activity/Assistive Device (Dressing Goal 1, OT)  lower body dressing  -TS      [place]/Cues Needed (Dressing Goal 1, OT)  minimum assist (75% or more patient effort)  -TS      Time Frame (Dressing Goal 1, OT)  long term goal (LTG);10 days  -TS      Progress/Outcome (Dressing Goal 1, OT)  goal not met  -TS         Strength Goal 1 (OT)    Strength Goal 1 (OT)  pt will increase B UE strength to 4+/5 to increase independence with ADL  -TS      Time Frame (Strength Goal 1, OT)  long term goal (LTG);10 days  -TS      Progress/Outcome (Strength Goal 1, OT)  goal not met  -TS          Endurance Goal 1 (OT)    Activity Level (Endurance Goal 1, OT)  endurance 2 fair +  -TS      Time Frame (Endurance Goal 1, OT)  long term goal (LTG);10 days  -TS      Progress/Outcome (Endurance Goal 1, OT)  goal not met  -TS        User Key  (r) = Recorded By, (t) = Taken By, (c) = Cosigned By    Initials Name Provider Type Discipline    TS Amy Meyers, LEDBETTER/L Occupational Therapy Assistant OT          Outcome Measures     Row Name 04/10/19 1350 19 1500 19 1327       How much help from another person do you currently need...    Turning from your back to your side while in flat bed without using bedrails?   --  --  2  -AF    Moving from lying on back to sitting on the side of a flat bed without bedrails?  --  --  2  -AF    Moving to and from a bed to a chair (including a wheelchair)?  --  --  2  -AF    Standing up from a chair using your arms (e.g., wheelchair, bedside chair)?  --  --  2  -AF    Climbing 3-5 steps with a railing?  --  --  1  -AF    To walk in hospital room?  --  --  1  -AF    AM-PAC 6 Clicks Score  --  --  10  -AF       How much help from another is currently needed...    Putting on and taking off regular lower body clothing?  2  -  2  -MW  --    Bathing (including washing, rinsing, and drying)  2  -  2  -MW  --    Toileting (which includes using toilet bed pan or urinal)  2  -  2  -MW  --    Putting on and taking off regular upper body clothing  2  -  2  -MW  --    Taking care of personal grooming (such as brushing teeth)  2  -  2  -MW  --    Eating meals  3  -  3  -MW  --    Score  13  -CJ  13  -MW  --       Functional Assessment    Outcome Measure Options  AM-PAC 6 Clicks Daily Activity (OT)  -  AM-PAC 6 Clicks Daily Activity (OT)  -  AM-PAC 6 Clicks Basic Mobility (PT)  -AF    Row Name 04/08/19 1308             How much help from another is currently needed...    Putting on and taking off regular lower body clothing?  2  -MM      Bathing (including washing, rinsing, and drying)  2  -MM      Toileting (which includes using toilet bed pan or urinal)  2  -MM      Putting on and taking off regular upper body clothing  2  -MM      Taking care of personal grooming (such as brushing teeth)  2  -MM      Eating meals  3  -MM      Score  13  -MM        User Key  (r) = Recorded By, (t) = Taken By, (c) = Cosigned By    Initials Name Provider Type     Gume Acharya LEDBETTER/L Occupational Therapy Assistant    MW Lay Florentino, OTR/L Occupational Therapist    MM Charo Petit COTA/L Occupational Therapy Assistant    AF Sia Martínez PTA Physical Therapy Assistant           Therapy Suggested Charges     Code   Minutes Charges    05600 (CPT®) Hc Ot Neuromusc Re Education Ea 15 Min      03987 (CPT®) Hc Ot Ther Proc Ea 15 Min 20 1    60060 (CPT®) Hc Ot Therapeutic Act Ea 15 Min      59154 (CPT®) Hc Ot Manual Therapy Ea 15 Min      94894 (CPT®) Hc Ot Iontophoresis Ea 15 Min      59135 (CPT®) Hc Ot Elec Stim Ea-Per 15 Min      72403 (CPT®) Hc Ot Ultrasound Ea 15 Min      19119 (CPT®) Hc Ot Self Care/Mgmt/Train Ea 15 Min 11 1    Total  31 2              OT Discharge Summary  Reason for Discharge: Discharge from facility  Outcomes Achieved: Refer to plan of care for updates on goals achieved  Discharge Destination: [facility]      ANDREA Saavedra  4/11/2019

## 2020-01-01 ENCOUNTER — HOSPITAL ENCOUNTER (INPATIENT)
Age: 60
LOS: 3 days | DRG: 189 | End: 2020-02-18
Attending: HOSPITALIST | Admitting: HOSPITALIST
Payer: MEDICARE

## 2020-01-01 ENCOUNTER — APPOINTMENT (OUTPATIENT)
Dept: GENERAL RADIOLOGY | Age: 60
DRG: 189 | End: 2020-01-01
Attending: HOSPITALIST
Payer: MEDICARE

## 2020-01-01 ENCOUNTER — APPOINTMENT (OUTPATIENT)
Dept: CT IMAGING | Age: 60
DRG: 189 | End: 2020-01-01
Attending: HOSPITALIST
Payer: MEDICARE

## 2020-01-01 ENCOUNTER — OUTSIDE FACILITY SERVICE (OUTPATIENT)
Dept: PULMONOLOGY | Facility: CLINIC | Age: 60
End: 2020-01-01

## 2020-01-01 ENCOUNTER — APPOINTMENT (OUTPATIENT)
Dept: ULTRASOUND IMAGING | Age: 60
DRG: 189 | End: 2020-01-01
Attending: HOSPITALIST
Payer: MEDICARE

## 2020-01-01 VITALS
TEMPERATURE: 98.4 F | HEIGHT: 65 IN | DIASTOLIC BLOOD PRESSURE: 48 MMHG | SYSTOLIC BLOOD PRESSURE: 92 MMHG | BODY MASS INDEX: 28.89 KG/M2 | OXYGEN SATURATION: 58 % | WEIGHT: 173.4 LBS | HEART RATE: 22 BPM

## 2020-01-01 LAB
ALBUMIN SERPL-MCNC: 2.5 G/DL (ref 3.5–5.2)
ALBUMIN SERPL-MCNC: 2.6 G/DL (ref 3.5–5.2)
ALP BLD-CCNC: 63 U/L (ref 40–130)
ALP BLD-CCNC: 70 U/L (ref 40–130)
ALT SERPL-CCNC: 23 U/L (ref 5–41)
ALT SERPL-CCNC: 38 U/L (ref 5–41)
AMORPHOUS: ABNORMAL /HPF
ANION GAP SERPL CALCULATED.3IONS-SCNC: 17 MMOL/L (ref 7–19)
ANION GAP SERPL CALCULATED.3IONS-SCNC: 17 MMOL/L (ref 7–19)
ANION GAP SERPL CALCULATED.3IONS-SCNC: 18 MMOL/L (ref 7–19)
ANION GAP SERPL CALCULATED.3IONS-SCNC: 19 MMOL/L (ref 7–19)
ANISOCYTOSIS: ABNORMAL
APTT: 58.5 SEC (ref 26–36.2)
APTT: 65 SEC (ref 26–36.2)
APTT: 72.4 SEC (ref 26–36.2)
AST SERPL-CCNC: 25 U/L (ref 5–40)
AST SERPL-CCNC: 40 U/L (ref 5–40)
BANDED NEUTROPHILS RELATIVE PERCENT: 2 % (ref 0–5)
BANDED NEUTROPHILS RELATIVE PERCENT: 4 % (ref 0–5)
BANDED NEUTROPHILS RELATIVE PERCENT: 6 % (ref 0–5)
BASE EXCESS ARTERIAL: -0.5 MMOL/L (ref -2–2)
BASE EXCESS ARTERIAL: -5.3 MMOL/L (ref -2–2)
BASE EXCESS ARTERIAL: -6.6 MMOL/L (ref -2–2)
BASOPHILS ABSOLUTE: 0 K/UL (ref 0–0.2)
BASOPHILS RELATIVE PERCENT: 0 % (ref 0–1)
BILIRUB SERPL-MCNC: 0.3 MG/DL (ref 0.2–1.2)
BILIRUB SERPL-MCNC: 0.4 MG/DL (ref 0.2–1.2)
BILIRUBIN URINE: NEGATIVE
BLOOD, URINE: ABNORMAL
BUN BLDV-MCNC: 51 MG/DL (ref 6–20)
BUN BLDV-MCNC: 66 MG/DL (ref 6–20)
BUN BLDV-MCNC: 68 MG/DL (ref 6–20)
BUN BLDV-MCNC: 82 MG/DL (ref 6–20)
CALCIUM SERPL-MCNC: 7.3 MG/DL (ref 8.6–10)
CALCIUM SERPL-MCNC: 7.7 MG/DL (ref 8.6–10)
CALCIUM SERPL-MCNC: 7.7 MG/DL (ref 8.6–10)
CALCIUM SERPL-MCNC: 7.9 MG/DL (ref 8.6–10)
CARBOXYHEMOGLOBIN ARTERIAL: 2.4 % (ref 0–5)
CARBOXYHEMOGLOBIN ARTERIAL: 2.4 % (ref 0–5)
CARBOXYHEMOGLOBIN ARTERIAL: 2.6 % (ref 0–5)
CHLORIDE BLD-SCNC: 104 MMOL/L (ref 98–111)
CHLORIDE BLD-SCNC: 104 MMOL/L (ref 98–111)
CHLORIDE BLD-SCNC: 106 MMOL/L (ref 98–111)
CHLORIDE BLD-SCNC: 99 MMOL/L (ref 98–111)
CLARITY: ABNORMAL
CO2: 19 MMOL/L (ref 22–29)
CO2: 20 MMOL/L (ref 22–29)
CO2: 20 MMOL/L (ref 22–29)
CO2: 22 MMOL/L (ref 22–29)
COLOR: YELLOW
CREAT SERPL-MCNC: 4.6 MG/DL (ref 0.5–1.2)
CREAT SERPL-MCNC: 5.7 MG/DL (ref 0.5–1.2)
CREAT SERPL-MCNC: 6.1 MG/DL (ref 0.5–1.2)
CREAT SERPL-MCNC: 6.6 MG/DL (ref 0.5–1.2)
CREATININE URINE: 49.8 MG/DL (ref 4.2–622)
CREATININE URINE: 54.5 MG/DL (ref 4.2–622)
EKG P AXIS: 14 DEGREES
EKG P AXIS: 65 DEGREES
EKG P AXIS: NORMAL DEGREES
EKG P-R INTERVAL: 162 MS
EKG P-R INTERVAL: 186 MS
EKG P-R INTERVAL: NORMAL MS
EKG Q-T INTERVAL: 266 MS
EKG Q-T INTERVAL: 360 MS
EKG Q-T INTERVAL: 364 MS
EKG QRS DURATION: 84 MS
EKG QRS DURATION: 86 MS
EKG QRS DURATION: 90 MS
EKG QTC CALCULATION (BAZETT): 396 MS
EKG QTC CALCULATION (BAZETT): 402 MS
EKG QTC CALCULATION (BAZETT): 435 MS
EKG T AXIS: 37 DEGREES
EKG T AXIS: 55 DEGREES
EKG T AXIS: 85 DEGREES
EOSINOPHIL,URINE: NORMAL
EOSINOPHILS ABSOLUTE: 0 K/UL (ref 0–0.6)
EOSINOPHILS RELATIVE PERCENT: 0 % (ref 0–5)
GFR NON-AFRICAN AMERICAN: 10
GFR NON-AFRICAN AMERICAN: 13
GFR NON-AFRICAN AMERICAN: 9
GFR NON-AFRICAN AMERICAN: 9
GLUCOSE BLD-MCNC: 127 MG/DL (ref 70–99)
GLUCOSE BLD-MCNC: 128 MG/DL (ref 70–99)
GLUCOSE BLD-MCNC: 133 MG/DL (ref 70–99)
GLUCOSE BLD-MCNC: 135 MG/DL (ref 70–99)
GLUCOSE BLD-MCNC: 140 MG/DL (ref 70–99)
GLUCOSE BLD-MCNC: 147 MG/DL (ref 70–99)
GLUCOSE BLD-MCNC: 149 MG/DL (ref 74–109)
GLUCOSE BLD-MCNC: 150 MG/DL (ref 70–99)
GLUCOSE BLD-MCNC: 153 MG/DL (ref 70–99)
GLUCOSE BLD-MCNC: 154 MG/DL (ref 70–99)
GLUCOSE BLD-MCNC: 155 MG/DL (ref 70–99)
GLUCOSE BLD-MCNC: 157 MG/DL (ref 70–99)
GLUCOSE BLD-MCNC: 160 MG/DL (ref 74–109)
GLUCOSE BLD-MCNC: 168 MG/DL (ref 70–99)
GLUCOSE BLD-MCNC: 169 MG/DL (ref 70–99)
GLUCOSE BLD-MCNC: 186 MG/DL (ref 70–99)
GLUCOSE BLD-MCNC: 254 MG/DL (ref 70–99)
GLUCOSE BLD-MCNC: 376 MG/DL (ref 74–109)
GLUCOSE BLD-MCNC: 408 MG/DL (ref 74–109)
GLUCOSE BLD-MCNC: 413 MG/DL (ref 70–99)
GLUCOSE BLD-MCNC: 413 MG/DL (ref 70–99)
GLUCOSE URINE: 500 MG/DL
HAV IGM SER IA-ACNC: NORMAL
HBA1C MFR BLD: 7.4 % (ref 4–6)
HBV SURFACE AB TITR SER: NORMAL {TITER}
HCO3 ARTERIAL: 19.7 MMOL/L (ref 22–26)
HCO3 ARTERIAL: 20.6 MMOL/L (ref 22–26)
HCO3 ARTERIAL: 24.9 MMOL/L (ref 22–26)
HCT VFR BLD CALC: 26.2 % (ref 42–52)
HCT VFR BLD CALC: 28.4 % (ref 42–52)
HCT VFR BLD CALC: 28.7 % (ref 42–52)
HCT VFR BLD CALC: 29 % (ref 42–52)
HEMOGLOBIN, ART, EXTENDED: 8.3 G/DL (ref 14–18)
HEMOGLOBIN, ART, EXTENDED: 9.1 G/DL (ref 14–18)
HEMOGLOBIN, ART, EXTENDED: 9.1 G/DL (ref 14–18)
HEMOGLOBIN: 8.5 G/DL (ref 14–18)
HEMOGLOBIN: 8.8 G/DL (ref 14–18)
HEMOGLOBIN: 9.1 G/DL (ref 14–18)
HEMOGLOBIN: 9.2 G/DL (ref 14–18)
HEPATITIS B CORE IGM ANTIBODY: NORMAL
HEPATITIS B SURFACE ANTIGEN INTERPRETATION: NORMAL
HEPATITIS C ANTIBODY INTERPRETATION: NORMAL
HYPOCHROMIA: ABNORMAL
IMMATURE GRANULOCYTES #: 0.3 K/UL
IMMATURE GRANULOCYTES #: 0.4 K/UL
IMMATURE GRANULOCYTES #: 0.5 K/UL
IMMATURE GRANULOCYTES #: 0.6 K/UL
INR BLD: 6.62 (ref 0.88–1.18)
INR BLD: 6.64 (ref 0.88–1.18)
INR BLD: 8.16 (ref 0.88–1.18)
INR BLD: 8.4 (ref 0.88–1.18)
KETONES, URINE: NEGATIVE MG/DL
LACTIC ACID: 1.1 MMOL/L (ref 0.5–1.9)
LEUKOCYTE ESTERASE, URINE: NEGATIVE
LV EF: 58 %
LVEF MODALITY: NORMAL
LYMPHOCYTES ABSOLUTE: 0.4 K/UL (ref 1.1–4.5)
LYMPHOCYTES ABSOLUTE: 0.5 K/UL (ref 1.1–4.5)
LYMPHOCYTES RELATIVE PERCENT: 6 % (ref 20–40)
LYMPHOCYTES RELATIVE PERCENT: 6 % (ref 20–40)
LYMPHOCYTES RELATIVE PERCENT: 9 % (ref 20–40)
LYMPHOCYTES RELATIVE PERCENT: 9 % (ref 20–40)
MAGNESIUM: 2.4 MG/DL (ref 1.6–2.6)
MAGNESIUM: 2.6 MG/DL (ref 1.6–2.6)
MCH RBC QN AUTO: 26 PG (ref 27–31)
MCH RBC QN AUTO: 26.2 PG (ref 27–31)
MCH RBC QN AUTO: 26.5 PG (ref 27–31)
MCH RBC QN AUTO: 26.5 PG (ref 27–31)
MCHC RBC AUTO-ENTMCNC: 31 G/DL (ref 33–37)
MCHC RBC AUTO-ENTMCNC: 31.7 G/DL (ref 33–37)
MCHC RBC AUTO-ENTMCNC: 31.7 G/DL (ref 33–37)
MCHC RBC AUTO-ENTMCNC: 32.4 G/DL (ref 33–37)
MCV RBC AUTO: 81.6 FL (ref 80–94)
MCV RBC AUTO: 82 FL (ref 80–94)
MCV RBC AUTO: 83.6 FL (ref 80–94)
MCV RBC AUTO: 84.5 FL (ref 80–94)
METHEMOGLOBIN ARTERIAL: 1.2 %
METHEMOGLOBIN ARTERIAL: 1.6 %
METHEMOGLOBIN ARTERIAL: 1.7 %
MONOCYTES ABSOLUTE: 0 K/UL (ref 0–0.9)
MONOCYTES ABSOLUTE: 0.1 K/UL (ref 0–0.9)
MONOCYTES RELATIVE PERCENT: 0 % (ref 0–10)
MONOCYTES RELATIVE PERCENT: 1 % (ref 0–10)
MONOCYTES RELATIVE PERCENT: 2 % (ref 0–10)
MONOCYTES RELATIVE PERCENT: 2 % (ref 0–10)
MRSA CULTURE ONLY: ABNORMAL
NEUTROPHILS ABSOLUTE: 4.3 K/UL (ref 1.5–7.5)
NEUTROPHILS ABSOLUTE: 4.7 K/UL (ref 1.5–7.5)
NEUTROPHILS ABSOLUTE: 5.2 K/UL (ref 1.5–7.5)
NEUTROPHILS ABSOLUTE: 7.3 K/UL (ref 1.5–7.5)
NEUTROPHILS RELATIVE PERCENT: 84 % (ref 50–65)
NEUTROPHILS RELATIVE PERCENT: 87 % (ref 50–65)
NEUTROPHILS RELATIVE PERCENT: 89 % (ref 50–65)
NEUTROPHILS RELATIVE PERCENT: 91 % (ref 50–65)
NITRITE, URINE: NEGATIVE
NUCLEATED RED BLOOD CELLS: 1 /100 WBC
O2 CONTENT ARTERIAL: 10.8 ML/DL
O2 CONTENT ARTERIAL: 11.3 ML/DL
O2 CONTENT ARTERIAL: 12.3 ML/DL
O2 SAT, ARTERIAL: 88.4 %
O2 SAT, ARTERIAL: 91.9 %
O2 SAT, ARTERIAL: 95 %
O2 THERAPY: ABNORMAL
ORGANISM: ABNORMAL
OVALOCYTES: ABNORMAL
OVALOCYTES: ABNORMAL
PARATHYROID HORMONE INTACT: 152.9 PG/ML (ref 15–65)
PCO2 ARTERIAL: 41 MMHG (ref 35–45)
PCO2 ARTERIAL: 42 MMHG (ref 35–45)
PCO2 ARTERIAL: 43 MMHG (ref 35–45)
PDW BLD-RTO: 17 % (ref 11.5–14.5)
PDW BLD-RTO: 17.2 % (ref 11.5–14.5)
PERFORMED ON: ABNORMAL
PH ARTERIAL: 7.28 (ref 7.35–7.45)
PH ARTERIAL: 7.31 (ref 7.35–7.45)
PH ARTERIAL: 7.37 (ref 7.35–7.45)
PH UA: 5.5 (ref 5–8)
PHOSPHORUS: 4.7 MG/DL (ref 2.5–4.5)
PHOSPHORUS: 4.9 MG/DL (ref 2.5–4.5)
PLATELET # BLD: 133 K/UL (ref 130–400)
PLATELET # BLD: 135 K/UL (ref 130–400)
PLATELET # BLD: 164 K/UL (ref 130–400)
PLATELET # BLD: 214 K/UL (ref 130–400)
PLATELET SLIDE REVIEW: ABNORMAL
PLATELET SLIDE REVIEW: ADEQUATE
PMV BLD AUTO: 10.6 FL (ref 9.4–12.4)
PMV BLD AUTO: 11.1 FL (ref 9.4–12.4)
PMV BLD AUTO: 11.3 FL (ref 9.4–12.4)
PMV BLD AUTO: 11.4 FL (ref 9.4–12.4)
PO2 ARTERIAL: 53 MMHG (ref 80–100)
PO2 ARTERIAL: 64 MMHG (ref 80–100)
PO2 ARTERIAL: 75 MMHG (ref 80–100)
POLYCHROMASIA: ABNORMAL
POTASSIUM REFLEX MAGNESIUM: 4.3 MMOL/L (ref 3.5–5)
POTASSIUM REFLEX MAGNESIUM: 4.5 MMOL/L (ref 3.5–5)
POTASSIUM REFLEX MAGNESIUM: 4.7 MMOL/L (ref 3.5–5)
POTASSIUM REFLEX MAGNESIUM: 4.8 MMOL/L (ref 3.5–5)
POTASSIUM SERPL-SCNC: 4.3 MMOL/L (ref 3.5–5)
POTASSIUM SERPL-SCNC: 4.7 MMOL/L (ref 3.5–5)
POTASSIUM, WHOLE BLOOD: 3.9
POTASSIUM, WHOLE BLOOD: 4.7
POTASSIUM, WHOLE BLOOD: 4.7
PROTEIN PROTEIN: 178 MG/DL (ref 15–45)
PROTEIN UA: 100 MG/DL
PROTHROMBIN TIME: 57.2 SEC (ref 12–14.6)
PROTHROMBIN TIME: 57.3 SEC (ref 12–14.6)
PROTHROMBIN TIME: 67.6 SEC (ref 12–14.6)
PROTHROMBIN TIME: 69.2 SEC (ref 12–14.6)
RAPID INFLUENZA  B AGN: NEGATIVE
RAPID INFLUENZA A AGN: NEGATIVE
RBC # BLD: 3.21 M/UL (ref 4.7–6.1)
RBC # BLD: 3.36 M/UL (ref 4.7–6.1)
RBC # BLD: 3.47 M/UL (ref 4.7–6.1)
RBC # BLD: 3.5 M/UL (ref 4.7–6.1)
RBC UA: ABNORMAL /HPF (ref 0–2)
SODIUM BLD-SCNC: 140 MMOL/L (ref 136–145)
SODIUM BLD-SCNC: 141 MMOL/L (ref 136–145)
SODIUM BLD-SCNC: 142 MMOL/L (ref 136–145)
SODIUM BLD-SCNC: 142 MMOL/L (ref 136–145)
SODIUM URINE: 77 MMOL/L
SPECIFIC GRAVITY UA: 1.01 (ref 1–1.03)
TOTAL PROTEIN: 5.7 G/DL (ref 6.6–8.7)
TOTAL PROTEIN: 6.4 G/DL (ref 6.6–8.7)
TROPONIN: 0.02 NG/ML (ref 0–0.03)
UREA NITROGEN, UR: 309 MG/DL
URIC ACID, SERUM: 9.2 MG/DL (ref 3.4–7)
URINE CULTURE, ROUTINE: NORMAL
UROBILINOGEN, URINE: 0.2 E.U./DL
VANCOMYCIN TROUGH: 18.7 UG/ML (ref 10–20)
VITAMIN D 25-HYDROXY: 6 NG/ML
WBC # BLD: 4.9 K/UL (ref 4.8–10.8)
WBC # BLD: 5 K/UL (ref 4.8–10.8)
WBC # BLD: 5.8 K/UL (ref 4.8–10.8)
WBC # BLD: 7.9 K/UL (ref 4.8–10.8)

## 2020-01-01 PROCEDURE — 6360000002 HC RX W HCPCS: Performed by: HOSPITALIST

## 2020-01-01 PROCEDURE — 85730 THROMBOPLASTIN TIME PARTIAL: CPT

## 2020-01-01 PROCEDURE — 84540 ASSAY OF URINE/UREA-N: CPT

## 2020-01-01 PROCEDURE — 2000000000 HC ICU R&B

## 2020-01-01 PROCEDURE — 82948 REAGENT STRIP/BLOOD GLUCOSE: CPT

## 2020-01-01 PROCEDURE — 81001 URINALYSIS AUTO W/SCOPE: CPT

## 2020-01-01 PROCEDURE — 2580000003 HC RX 258: Performed by: HOSPITALIST

## 2020-01-01 PROCEDURE — 6370000000 HC RX 637 (ALT 250 FOR IP): Performed by: HOSPITALIST

## 2020-01-01 PROCEDURE — 85025 COMPLETE CBC W/AUTO DIFF WBC: CPT

## 2020-01-01 PROCEDURE — 83735 ASSAY OF MAGNESIUM: CPT

## 2020-01-01 PROCEDURE — 87040 BLOOD CULTURE FOR BACTERIA: CPT

## 2020-01-01 PROCEDURE — 84300 ASSAY OF URINE SODIUM: CPT

## 2020-01-01 PROCEDURE — 99231 SBSQ HOSP IP/OBS SF/LOW 25: CPT | Performed by: NURSE PRACTITIONER

## 2020-01-01 PROCEDURE — 83605 ASSAY OF LACTIC ACID: CPT

## 2020-01-01 PROCEDURE — 70450 CT HEAD/BRAIN W/O DYE: CPT

## 2020-01-01 PROCEDURE — 83970 ASSAY OF PARATHORMONE: CPT

## 2020-01-01 PROCEDURE — 84132 ASSAY OF SERUM POTASSIUM: CPT

## 2020-01-01 PROCEDURE — 8010000000 HC HEMODIALYSIS ACUTE INPT

## 2020-01-01 PROCEDURE — 84156 ASSAY OF PROTEIN URINE: CPT

## 2020-01-01 PROCEDURE — 36415 COLL VENOUS BLD VENIPUNCTURE: CPT

## 2020-01-01 PROCEDURE — 84550 ASSAY OF BLOOD/URIC ACID: CPT

## 2020-01-01 PROCEDURE — 93010 ELECTROCARDIOGRAM REPORT: CPT | Performed by: INTERNAL MEDICINE

## 2020-01-01 PROCEDURE — 5A1D70Z PERFORMANCE OF URINARY FILTRATION, INTERMITTENT, LESS THAN 6 HOURS PER DAY: ICD-10-PCS | Performed by: INTERNAL MEDICINE

## 2020-01-01 PROCEDURE — 83036 HEMOGLOBIN GLYCOSYLATED A1C: CPT

## 2020-01-01 PROCEDURE — 99232 SBSQ HOSP IP/OBS MODERATE 35: CPT | Performed by: INTERNAL MEDICINE

## 2020-01-01 PROCEDURE — 71045 X-RAY EXAM CHEST 1 VIEW: CPT

## 2020-01-01 PROCEDURE — 85610 PROTHROMBIN TIME: CPT

## 2020-01-01 PROCEDURE — 84100 ASSAY OF PHOSPHORUS: CPT

## 2020-01-01 PROCEDURE — 2500000003 HC RX 250 WO HCPCS: Performed by: HOSPITALIST

## 2020-01-01 PROCEDURE — 94640 AIRWAY INHALATION TREATMENT: CPT

## 2020-01-01 PROCEDURE — 80048 BASIC METABOLIC PNL TOTAL CA: CPT

## 2020-01-01 PROCEDURE — 94660 CPAP INITIATION&MGMT: CPT

## 2020-01-01 PROCEDURE — 36556 INSERT NON-TUNNEL CV CATH: CPT | Performed by: SURGERY

## 2020-01-01 PROCEDURE — 80053 COMPREHEN METABOLIC PANEL: CPT

## 2020-01-01 PROCEDURE — 87081 CULTURE SCREEN ONLY: CPT

## 2020-01-01 PROCEDURE — 82803 BLOOD GASES ANY COMBINATION: CPT

## 2020-01-01 PROCEDURE — 2580000003 HC RX 258: Performed by: INTERNAL MEDICINE

## 2020-01-01 PROCEDURE — 82570 ASSAY OF URINE CREATININE: CPT

## 2020-01-01 PROCEDURE — 2700000000 HC OXYGEN THERAPY PER DAY

## 2020-01-01 PROCEDURE — 99233 SBSQ HOSP IP/OBS HIGH 50: CPT | Performed by: INTERNAL MEDICINE

## 2020-01-01 PROCEDURE — 36600 WITHDRAWAL OF ARTERIAL BLOOD: CPT

## 2020-01-01 PROCEDURE — 93306 TTE W/DOPPLER COMPLETE: CPT

## 2020-01-01 PROCEDURE — 99223 1ST HOSP IP/OBS HIGH 75: CPT | Performed by: INTERNAL MEDICINE

## 2020-01-01 PROCEDURE — 80074 ACUTE HEPATITIS PANEL: CPT

## 2020-01-01 PROCEDURE — 87205 SMEAR GRAM STAIN: CPT

## 2020-01-01 PROCEDURE — 99221 1ST HOSP IP/OBS SF/LOW 40: CPT | Performed by: INTERNAL MEDICINE

## 2020-01-01 PROCEDURE — 87804 INFLUENZA ASSAY W/OPTIC: CPT

## 2020-01-01 PROCEDURE — 93005 ELECTROCARDIOGRAM TRACING: CPT | Performed by: HOSPITALIST

## 2020-01-01 PROCEDURE — 76770 US EXAM ABDO BACK WALL COMP: CPT

## 2020-01-01 PROCEDURE — 02HV33Z INSERTION OF INFUSION DEVICE INTO SUPERIOR VENA CAVA, PERCUTANEOUS APPROACH: ICD-10-PCS | Performed by: SURGERY

## 2020-01-01 PROCEDURE — 80202 ASSAY OF VANCOMYCIN: CPT

## 2020-01-01 PROCEDURE — 99231 SBSQ HOSP IP/OBS SF/LOW 25: CPT | Performed by: SURGERY

## 2020-01-01 PROCEDURE — 86706 HEP B SURFACE ANTIBODY: CPT

## 2020-01-01 PROCEDURE — 87086 URINE CULTURE/COLONY COUNT: CPT

## 2020-01-01 PROCEDURE — 84484 ASSAY OF TROPONIN QUANT: CPT

## 2020-01-01 PROCEDURE — 2500000003 HC RX 250 WO HCPCS

## 2020-01-01 PROCEDURE — 82306 VITAMIN D 25 HYDROXY: CPT

## 2020-01-01 PROCEDURE — 6360000002 HC RX W HCPCS: Performed by: INTERNAL MEDICINE

## 2020-01-01 RX ORDER — ONDANSETRON 2 MG/ML
4 INJECTION INTRAMUSCULAR; INTRAVENOUS EVERY 6 HOURS PRN
Status: DISCONTINUED | OUTPATIENT
Start: 2020-01-01 | End: 2020-02-19 | Stop reason: HOSPADM

## 2020-01-01 RX ORDER — ALBUTEROL SULFATE 2.5 MG/3ML
2.5 SOLUTION RESPIRATORY (INHALATION)
Status: DISCONTINUED | OUTPATIENT
Start: 2020-01-01 | End: 2020-02-19 | Stop reason: HOSPADM

## 2020-01-01 RX ORDER — AMIODARONE HYDROCHLORIDE 200 MG/1
200 TABLET ORAL DAILY
Status: DISCONTINUED | OUTPATIENT
Start: 2020-01-01 | End: 2020-02-19 | Stop reason: HOSPADM

## 2020-01-01 RX ORDER — DEXTROSE MONOHYDRATE 50 MG/ML
100 INJECTION, SOLUTION INTRAVENOUS PRN
Status: DISCONTINUED | OUTPATIENT
Start: 2020-01-01 | End: 2020-02-19 | Stop reason: HOSPADM

## 2020-01-01 RX ORDER — DILTIAZEM HYDROCHLORIDE 5 MG/ML
10 INJECTION INTRAVENOUS ONCE
Status: COMPLETED | OUTPATIENT
Start: 2020-01-01 | End: 2020-01-01

## 2020-01-01 RX ORDER — POTASSIUM CHLORIDE 7.45 MG/ML
10 INJECTION INTRAVENOUS PRN
Status: DISCONTINUED | OUTPATIENT
Start: 2020-01-01 | End: 2020-02-19 | Stop reason: HOSPADM

## 2020-01-01 RX ORDER — HEPARIN SODIUM 10000 [USP'U]/100ML
12 INJECTION, SOLUTION INTRAVENOUS CONTINUOUS
Status: DISCONTINUED | OUTPATIENT
Start: 2020-01-01 | End: 2020-01-01

## 2020-01-01 RX ORDER — ACETAMINOPHEN 325 MG/1
650 TABLET ORAL EVERY 4 HOURS PRN
Status: DISCONTINUED | OUTPATIENT
Start: 2020-01-01 | End: 2020-02-19 | Stop reason: HOSPADM

## 2020-01-01 RX ORDER — PROMETHAZINE HYDROCHLORIDE 12.5 MG/1
12.5 TABLET ORAL EVERY 6 HOURS PRN
COMMUNITY

## 2020-01-01 RX ORDER — BUSPIRONE HYDROCHLORIDE 10 MG/1
10 TABLET ORAL 2 TIMES DAILY
COMMUNITY

## 2020-01-01 RX ORDER — GABAPENTIN 300 MG/1
300 CAPSULE ORAL 2 TIMES DAILY
COMMUNITY

## 2020-01-01 RX ORDER — SODIUM CHLORIDE 0.9 % (FLUSH) 0.9 %
10 SYRINGE (ML) INJECTION PRN
Status: DISCONTINUED | OUTPATIENT
Start: 2020-01-01 | End: 2020-02-19 | Stop reason: HOSPADM

## 2020-01-01 RX ORDER — METOPROLOL TARTRATE 5 MG/5ML
INJECTION INTRAVENOUS
Status: COMPLETED
Start: 2020-01-01 | End: 2020-01-01

## 2020-01-01 RX ORDER — FLUTICASONE PROPIONATE 50 MCG
1 SPRAY, SUSPENSION (ML) NASAL 2 TIMES DAILY
Status: DISCONTINUED | OUTPATIENT
Start: 2020-01-01 | End: 2020-02-19 | Stop reason: HOSPADM

## 2020-01-01 RX ORDER — HEPARIN SODIUM 1000 [USP'U]/ML
4000 INJECTION, SOLUTION INTRAVENOUS; SUBCUTANEOUS ONCE
Status: DISCONTINUED | OUTPATIENT
Start: 2020-01-01 | End: 2020-01-01

## 2020-01-01 RX ORDER — NICOTINE POLACRILEX 4 MG
15 LOZENGE BUCCAL PRN
Status: DISCONTINUED | OUTPATIENT
Start: 2020-01-01 | End: 2020-02-19 | Stop reason: HOSPADM

## 2020-01-01 RX ORDER — CYCLOBENZAPRINE HCL 5 MG
5 TABLET ORAL EVERY 8 HOURS PRN
COMMUNITY

## 2020-01-01 RX ORDER — IPRATROPIUM BROMIDE AND ALBUTEROL SULFATE 2.5; .5 MG/3ML; MG/3ML
1 SOLUTION RESPIRATORY (INHALATION)
Status: DISCONTINUED | OUTPATIENT
Start: 2020-01-01 | End: 2020-01-01

## 2020-01-01 RX ORDER — LACTOBACILLUS RHAMNOSUS GG 10B CELL
1 CAPSULE ORAL DAILY
Status: DISCONTINUED | OUTPATIENT
Start: 2020-01-01 | End: 2020-02-19 | Stop reason: HOSPADM

## 2020-01-01 RX ORDER — HEPARIN SODIUM 1000 [USP'U]/ML
INJECTION, SOLUTION INTRAVENOUS; SUBCUTANEOUS
Status: DISPENSED
Start: 2020-01-01 | End: 2020-01-01

## 2020-01-01 RX ORDER — ARIPIPRAZOLE 10 MG/1
10 TABLET ORAL DAILY
Status: DISCONTINUED | OUTPATIENT
Start: 2020-01-01 | End: 2020-02-19 | Stop reason: HOSPADM

## 2020-01-01 RX ORDER — DEXTROSE MONOHYDRATE 25 G/50ML
12.5 INJECTION, SOLUTION INTRAVENOUS PRN
Status: DISCONTINUED | OUTPATIENT
Start: 2020-01-01 | End: 2020-02-19 | Stop reason: HOSPADM

## 2020-01-01 RX ORDER — ARIPIPRAZOLE 10 MG/1
10 TABLET ORAL DAILY
COMMUNITY

## 2020-01-01 RX ORDER — NALOXONE HYDROCHLORIDE 0.4 MG/ML
0.4 INJECTION, SOLUTION INTRAMUSCULAR; INTRAVENOUS; SUBCUTANEOUS PRN
Status: DISCONTINUED | OUTPATIENT
Start: 2020-01-01 | End: 2020-02-19 | Stop reason: HOSPADM

## 2020-01-01 RX ORDER — BISACODYL 10 MG
10 SUPPOSITORY, RECTAL RECTAL DAILY PRN
Status: DISCONTINUED | OUTPATIENT
Start: 2020-01-01 | End: 2020-02-19 | Stop reason: HOSPADM

## 2020-01-01 RX ORDER — LORATADINE 10 MG/1
10 TABLET ORAL DAILY
COMMUNITY

## 2020-01-01 RX ORDER — DIPHENOXYLATE HYDROCHLORIDE AND ATROPINE SULFATE 2.5; .025 MG/1; MG/1
1 TABLET ORAL EVERY 8 HOURS PRN
COMMUNITY

## 2020-01-01 RX ORDER — FLUTICASONE PROPIONATE 50 MCG
1 SPRAY, SUSPENSION (ML) NASAL 2 TIMES DAILY
COMMUNITY

## 2020-01-01 RX ORDER — AMIODARONE HYDROCHLORIDE 200 MG/1
200 TABLET ORAL DAILY
COMMUNITY

## 2020-01-01 RX ORDER — FERROUS SULFATE 325(65) MG
325 TABLET ORAL
Status: DISCONTINUED | OUTPATIENT
Start: 2020-01-01 | End: 2020-02-19 | Stop reason: HOSPADM

## 2020-01-01 RX ORDER — METOPROLOL TARTRATE 5 MG/5ML
5 INJECTION INTRAVENOUS ONCE
Status: COMPLETED | OUTPATIENT
Start: 2020-01-01 | End: 2020-01-01

## 2020-01-01 RX ORDER — CHOLESTYRAMINE LIGHT 4 G/5.7G
4 POWDER, FOR SUSPENSION ORAL DAILY
Status: DISCONTINUED | OUTPATIENT
Start: 2020-01-01 | End: 2020-02-19 | Stop reason: HOSPADM

## 2020-01-01 RX ORDER — HEPARIN SODIUM 1000 [USP'U]/ML
2000 INJECTION, SOLUTION INTRAVENOUS; SUBCUTANEOUS PRN
Status: DISCONTINUED | OUTPATIENT
Start: 2020-01-01 | End: 2020-01-01

## 2020-01-01 RX ORDER — SENNA PLUS 8.6 MG/1
1 TABLET ORAL DAILY PRN
Status: DISCONTINUED | OUTPATIENT
Start: 2020-01-01 | End: 2020-02-19 | Stop reason: HOSPADM

## 2020-01-01 RX ORDER — MAGNESIUM SULFATE 1 G/100ML
1 INJECTION INTRAVENOUS PRN
Status: DISCONTINUED | OUTPATIENT
Start: 2020-01-01 | End: 2020-02-19 | Stop reason: HOSPADM

## 2020-01-01 RX ORDER — LEVOTHYROXINE SODIUM 0.03 MG/1
25 TABLET ORAL DAILY
Status: DISCONTINUED | OUTPATIENT
Start: 2020-01-01 | End: 2020-02-19 | Stop reason: HOSPADM

## 2020-01-01 RX ORDER — IPRATROPIUM BROMIDE AND ALBUTEROL SULFATE 2.5; .5 MG/3ML; MG/3ML
1 SOLUTION RESPIRATORY (INHALATION) 4 TIMES DAILY
Status: DISCONTINUED | OUTPATIENT
Start: 2020-01-01 | End: 2020-02-19 | Stop reason: HOSPADM

## 2020-01-01 RX ORDER — HEPARIN SODIUM 1000 [USP'U]/ML
4000 INJECTION, SOLUTION INTRAVENOUS; SUBCUTANEOUS PRN
Status: DISCONTINUED | OUTPATIENT
Start: 2020-01-01 | End: 2020-01-01

## 2020-01-01 RX ORDER — DIAZEPAM 5 MG/1
5 TABLET ORAL 2 TIMES DAILY
COMMUNITY

## 2020-01-01 RX ORDER — LEVOTHYROXINE SODIUM 0.03 MG/1
25 TABLET ORAL DAILY
COMMUNITY

## 2020-01-01 RX ORDER — ACETAMINOPHEN AND CODEINE PHOSPHATE 300; 30 MG/1; MG/1
1 TABLET ORAL 2 TIMES DAILY
COMMUNITY

## 2020-01-01 RX ORDER — POTASSIUM CHLORIDE 20 MEQ/1
40 TABLET, EXTENDED RELEASE ORAL PRN
Status: DISCONTINUED | OUTPATIENT
Start: 2020-01-01 | End: 2020-02-19 | Stop reason: HOSPADM

## 2020-01-01 RX ORDER — PAROXETINE 10 MG/1
10 TABLET, FILM COATED ORAL EVERY MORNING
COMMUNITY

## 2020-01-01 RX ORDER — SODIUM CHLORIDE 0.9 % (FLUSH) 0.9 %
10 SYRINGE (ML) INJECTION EVERY 12 HOURS SCHEDULED
Status: DISCONTINUED | OUTPATIENT
Start: 2020-01-01 | End: 2020-02-19 | Stop reason: HOSPADM

## 2020-01-01 RX ORDER — COLESEVELAM 180 1/1
1875 TABLET ORAL 2 TIMES DAILY WITH MEALS
COMMUNITY

## 2020-01-01 RX ORDER — FERROUS SULFATE 325(65) MG
325 TABLET ORAL
COMMUNITY

## 2020-01-01 RX ADMIN — SODIUM CHLORIDE, PRESERVATIVE FREE 10 ML: 5 INJECTION INTRAVENOUS at 09:00

## 2020-01-01 RX ADMIN — INSULIN LISPRO 2 UNITS: 100 INJECTION, SOLUTION INTRAVENOUS; SUBCUTANEOUS at 15:09

## 2020-01-01 RX ADMIN — METOPROLOL TARTRATE 5 MG: 5 INJECTION, SOLUTION INTRAVENOUS at 14:24

## 2020-01-01 RX ADMIN — MEROPENEM 500 MG: 500 INJECTION, POWDER, FOR SOLUTION INTRAVENOUS at 21:46

## 2020-01-01 RX ADMIN — Medication: at 21:40

## 2020-01-01 RX ADMIN — INSULIN LISPRO 2 UNITS: 100 INJECTION, SOLUTION INTRAVENOUS; SUBCUTANEOUS at 18:05

## 2020-01-01 RX ADMIN — IPRATROPIUM BROMIDE AND ALBUTEROL SULFATE 1 AMPULE: .5; 3 SOLUTION RESPIRATORY (INHALATION) at 06:31

## 2020-01-01 RX ADMIN — MEROPENEM 500 MG: 500 INJECTION, POWDER, FOR SOLUTION INTRAVENOUS at 13:05

## 2020-01-01 RX ADMIN — Medication 1000 MG: at 15:38

## 2020-01-01 RX ADMIN — MEROPENEM 500 MG: 500 INJECTION, POWDER, FOR SOLUTION INTRAVENOUS at 21:39

## 2020-01-01 RX ADMIN — INSULIN LISPRO 6 UNITS: 100 INJECTION, SOLUTION INTRAVENOUS; SUBCUTANEOUS at 22:44

## 2020-01-01 RX ADMIN — INSULIN LISPRO 2 UNITS: 100 INJECTION, SOLUTION INTRAVENOUS; SUBCUTANEOUS at 17:59

## 2020-01-01 RX ADMIN — IPRATROPIUM BROMIDE AND ALBUTEROL SULFATE 1 AMPULE: .5; 3 SOLUTION RESPIRATORY (INHALATION) at 10:34

## 2020-01-01 RX ADMIN — IPRATROPIUM BROMIDE AND ALBUTEROL SULFATE 1 AMPULE: .5; 3 SOLUTION RESPIRATORY (INHALATION) at 13:58

## 2020-01-01 RX ADMIN — FLUTICASONE PROPIONATE 1 SPRAY: 50 SPRAY, METERED NASAL at 20:44

## 2020-01-01 RX ADMIN — IPRATROPIUM BROMIDE AND ALBUTEROL SULFATE 1 AMPULE: .5; 3 SOLUTION RESPIRATORY (INHALATION) at 18:20

## 2020-01-01 RX ADMIN — INSULIN LISPRO 1 UNITS: 100 INJECTION, SOLUTION INTRAVENOUS; SUBCUTANEOUS at 20:40

## 2020-01-01 RX ADMIN — IPRATROPIUM BROMIDE AND ALBUTEROL SULFATE 1 AMPULE: .5; 3 SOLUTION RESPIRATORY (INHALATION) at 13:59

## 2020-01-01 RX ADMIN — MEROPENEM 500 MG: 500 INJECTION, POWDER, FOR SOLUTION INTRAVENOUS at 10:30

## 2020-01-01 RX ADMIN — IPRATROPIUM BROMIDE AND ALBUTEROL SULFATE 1 AMPULE: .5; 3 SOLUTION RESPIRATORY (INHALATION) at 14:09

## 2020-01-01 RX ADMIN — SODIUM CHLORIDE, PRESERVATIVE FREE 10 ML: 5 INJECTION INTRAVENOUS at 21:55

## 2020-01-01 RX ADMIN — FLUTICASONE PROPIONATE 1 SPRAY: 50 SPRAY, METERED NASAL at 10:45

## 2020-01-01 RX ADMIN — DILTIAZEM HYDROCHLORIDE 5 MG/HR: 5 INJECTION INTRAVENOUS at 12:05

## 2020-01-01 RX ADMIN — Medication: at 08:29

## 2020-01-01 RX ADMIN — DILTIAZEM HYDROCHLORIDE 10 MG/HR: 5 INJECTION INTRAVENOUS at 02:44

## 2020-01-01 RX ADMIN — INSULIN LISPRO 12 UNITS: 100 INJECTION, SOLUTION INTRAVENOUS; SUBCUTANEOUS at 22:30

## 2020-01-01 RX ADMIN — IPRATROPIUM BROMIDE AND ALBUTEROL SULFATE 1 AMPULE: .5; 3 SOLUTION RESPIRATORY (INHALATION) at 10:48

## 2020-01-01 RX ADMIN — SODIUM CHLORIDE, PRESERVATIVE FREE 10 ML: 5 INJECTION INTRAVENOUS at 20:43

## 2020-01-01 RX ADMIN — IPRATROPIUM BROMIDE AND ALBUTEROL SULFATE 1 AMPULE: .5; 3 SOLUTION RESPIRATORY (INHALATION) at 10:03

## 2020-01-01 RX ADMIN — INSULIN LISPRO 2 UNITS: 100 INJECTION, SOLUTION INTRAVENOUS; SUBCUTANEOUS at 01:06

## 2020-01-01 RX ADMIN — INSULIN LISPRO 12 UNITS: 100 INJECTION, SOLUTION INTRAVENOUS; SUBCUTANEOUS at 01:15

## 2020-01-01 RX ADMIN — MEROPENEM 500 MG: 500 INJECTION, POWDER, FOR SOLUTION INTRAVENOUS at 21:55

## 2020-01-01 RX ADMIN — METOPROLOL TARTRATE 5 MG: 5 INJECTION INTRAVENOUS at 14:24

## 2020-01-01 RX ADMIN — INSULIN LISPRO 2 UNITS: 100 INJECTION, SOLUTION INTRAVENOUS; SUBCUTANEOUS at 01:18

## 2020-01-01 RX ADMIN — INSULIN LISPRO 6 UNITS: 100 INJECTION, SOLUTION INTRAVENOUS; SUBCUTANEOUS at 05:12

## 2020-01-01 RX ADMIN — DILTIAZEM HYDROCHLORIDE 10 MG: 5 INJECTION INTRAVENOUS at 12:00

## 2020-01-01 RX ADMIN — METOPROLOL TARTRATE 5 MG: 5 INJECTION, SOLUTION INTRAVENOUS at 11:20

## 2020-01-01 RX ADMIN — FLUTICASONE PROPIONATE 1 SPRAY: 50 SPRAY, METERED NASAL at 21:48

## 2020-01-01 RX ADMIN — Medication: at 00:31

## 2020-01-01 RX ADMIN — SODIUM CHLORIDE, PRESERVATIVE FREE 10 ML: 5 INJECTION INTRAVENOUS at 20:44

## 2020-01-01 RX ADMIN — IPRATROPIUM BROMIDE AND ALBUTEROL SULFATE 1 AMPULE: .5; 3 SOLUTION RESPIRATORY (INHALATION) at 06:19

## 2020-01-01 RX ADMIN — IPRATROPIUM BROMIDE AND ALBUTEROL SULFATE 1 AMPULE: .5; 3 SOLUTION RESPIRATORY (INHALATION) at 06:15

## 2020-01-01 RX ADMIN — VANCOMYCIN HYDROCHLORIDE 1000 MG: 10 INJECTION, POWDER, LYOPHILIZED, FOR SOLUTION INTRAVENOUS at 22:35

## 2020-01-01 RX ADMIN — IPRATROPIUM BROMIDE AND ALBUTEROL SULFATE 1 AMPULE: .5; 3 SOLUTION RESPIRATORY (INHALATION) at 18:19

## 2020-01-01 RX ADMIN — IPRATROPIUM BROMIDE AND ALBUTEROL SULFATE 1 AMPULE: .5; 3 SOLUTION RESPIRATORY (INHALATION) at 18:18

## 2020-01-01 RX ADMIN — METOPROLOL TARTRATE 5 MG: 5 INJECTION INTRAVENOUS at 11:20

## 2020-01-01 RX ADMIN — PHYTONADIONE 5 MG: 10 INJECTION, EMULSION INTRAMUSCULAR; INTRAVENOUS; SUBCUTANEOUS at 13:06

## 2020-01-01 ASSESSMENT — PAIN SCALES - GENERAL
PAINLEVEL_OUTOF10: 0
PAINLEVEL_OUTOF10: 4
PAINLEVEL_OUTOF10: 0

## 2020-02-15 PROBLEM — J96.01 ACUTE HYPOXEMIC RESPIRATORY FAILURE (HCC): Status: ACTIVE | Noted: 2020-01-01

## 2020-02-15 NOTE — PROGRESS NOTES
As per transfer center \"pneumoniae\" \"needs ICU\" but unable to specify further  As per transfer center age 61, as per referring doc age 76  As per referring facility who was in clinic, they are in the CCU \"because they are really sick\"  They had bee admitted for Sepsis due to Pneumoniae \"four or 5 days ago\" (cound not specify further about type suspected or location in lung or antibiotics)  History of Fetal Alcohol Syndrome  Renal function (creatanine) was \"about 1.9\" was 4 yesterday, now about 5.2  Pressure 150/(80-90)  On BiPAP at OSH, NOT on home O2  Blood Cxx all negative so far      Preliminary plans / orders all entered

## 2020-02-16 NOTE — CONSULTS
Provider, MD   acetaminophen-codeine (TYLENOL/CODEINE #3) 300-30 MG per tablet Take 1 tablet by mouth 2 times daily. Yes Historical Provider, MD   amiodarone (CORDARONE) 200 MG tablet Take 200 mg by mouth daily   Yes Historical Provider, MD   cyclobenzaprine (FLEXERIL) 5 MG tablet Take 5 mg by mouth every 8 hours as needed for Muscle spasms   Yes Historical Provider, MD   diphenoxylate-atropine (LOMOTIL) 2.5-0.025 MG per tablet Take 1 tablet by mouth every 8 hours as needed for Diarrhea. Yes Historical Provider, MD   ferrous sulfate 325 (65 Fe) MG tablet Take 325 mg by mouth daily (with breakfast)   Yes Historical Provider, MD   fluticasone (FLONASE) 50 MCG/ACT nasal spray 1 spray by Each Nostril route 2 times daily   Yes Historical Provider, MD   folic acid-pyridoxine-cyanocobalamine (FOLTX) 2.5-25-1 MG TABS tablet Take 1 tablet by mouth daily   Yes Historical Provider, MD   gabapentin (NEURONTIN) 300 MG capsule Take 300 mg by mouth 2 times daily. Yes Historical Provider, MD   levothyroxine (SYNTHROID) 25 MCG tablet Take 25 mcg by mouth Daily   Yes Historical Provider, MD   loratadine (CLARITIN) 10 MG tablet Take 10 mg by mouth daily   Yes Historical Provider, MD   metoprolol tartrate (LOPRESSOR) 25 MG tablet Take 25 mg by mouth 2 times daily   Yes Historical Provider, MD   promethazine (PHENERGAN) 12.5 MG tablet Take 12.5 mg by mouth every 6 hours as needed for Nausea   Yes Historical Provider, MD   Probiotic Product (RISAQUAD PO) Take by mouth daily I cap daily   Yes Historical Provider, MD   colesevelam (WELCHOL) 625 MG tablet Take 1,875 mg by mouth 2 times daily (with meals)   Yes Historical Provider, MD       Allergies:    Chlorpromazine hcl; Hydrocodone; Nabumetone; Nsaids; Oxaprozin; Oxycodone; Prochlorperazine; and Thorazine [chlorpromazine]    Social History:  Tobacco:   has no history on file for tobacco.  Alcohol:   has no history on file for alcohol.   Illicit Drugs: denies    Family History:  No

## 2020-02-16 NOTE — CONSULTS
Nephrology ([address]) Consult Note      Patient:  Juan José Kulkarni  YOB: 1960  Date of Service: 2/16/2020  MRN: [id number]   Acct: [de-identified]   Primary Care Physician: No primary care provider on file. Advance Directive: Full Code  Admit Date: 2/15/2020       Hospital Day: 1  Referring Provider: Russ Kohli MD    Patient independently seen and examined, Chart, Consults, Notes, Operative notes, Labs, Cardiology, and Radiology studies reviewed as able. Subjective:  Juan José Kulkarni is a 61 y.o. male  whom we were consulted for Acute renal failure. Patient poor historian with history of fetal alcohol syndrome and unable to fully participate in the history and physical examination. He denies any specific complaints upon questioning. Denies chest pain, shortness of air, nausea vomiting. However he does require a BiPAP to maintain adequate oxygenation. There is some confusion about whether he is a [place] of the state or not and  were attempting to confirm this. Allergies:  Chlorpromazine hcl; Hydrocodone; Nabumetone; Nsaids; Oxaprozin;  Oxycodone; Prochlorperazine; and Thorazine [chlorpromazine]    Medicines:  Current Facility-Administered Medications   Medication Dose Route Frequency Provider Last Rate Last Dose    meropenem (MERREM) 500 mg IVPB (mini-bag)  500 mg Intravenous Q12H Rosa Isela Palacios MD        sodium chloride flush 0.9 % injection 10 mL  10 mL Intravenous 2 times per day Russ Kohli MD   Stopped at 02/16/20 0858    sodium chloride flush 0.9 % injection 10 mL  10 mL Intravenous PRN Russ Kohli MD        ondansetron [facility]) injection 4 mg  4 mg Intravenous Q6H PRN Russ Kohli MD        bisacodyl (DULCOLAX) suppository 10 mg  10 mg Rectal Daily PRN Russ Kohli MD        naloxone [facility]) injection 0.4 mg  0.4 mg Intravenous PRN Russ Kohli MD        acetaminophen (TYLENOL) tablet 650 mg  650 mg Oral Q4H PRN Russ Kohli MD  albuterol (PROVENTIL) nebulizer solution 2.5 mg  2.5 mg Nebulization Q1H PRN Sharif Nolan MD        glucose (GLUTOSE) 40 % oral gel 15 g  15 g Oral PRN Sharif Nolan MD        dextrose 50 % IV solution  12.5 g Intravenous PRN Sharif Nolan MD        glucagon (rDNA) injection 1 mg  1 mg Intramuscular PRN Sharif Nolan MD        dextrose 5 % solution  100 mL/hr Intravenous PRN Sharif Nolan MD        sodium chloride flush 0.9 % injection 10 mL  10 mL Intravenous 2 times per day Rachel Brizuela MD   Stopped at 02/16/20 0858    sodium chloride flush 0.9 % injection 10 mL  10 mL Intravenous PRN Rachel Brizuela MD        ondansetron [facility]) injection 4 mg  4 mg Intravenous Q6H PRN Rachel Brizuela MD        sodium bicarbonate 50 mEq in sodium chloride 0.45 % 1,000 mL infusion   Intravenous Continuous Rachel Brizuela  mL/hr at 02/16/20 0829      potassium chloride (KLOR-CON M) extended release tablet 40 mEq  40 mEq Oral PRN Rachel Brizuela MD        Or   Jorge Wilson potassium bicarb-citric acid (EFFER-K) effervescent tablet 40 mEq  40 mEq Oral PRN Rachel Brizuela MD        Or   Jorge Wilson potassium chloride 10 mEq/100 mL IVPB (Peripheral Line)  10 mEq Intravenous PRN Rachel Brizuela MD        potassium chloride 10 mEq/100 mL IVPB (Peripheral Line)  10 mEq Intravenous PRN Rachel Brizuela MD        magnesium sulfate 1 g in dextrose 5% 100 mL IVPB  1 g Intravenous PRN Rachel Brizuela MD        senna (SENOKOT) tablet 8.6 mg  1 tablet Oral Daily PRN Rachel Brizuela MD        acetaminophen (TYLENOL) tablet 650 mg  650 mg Oral Q4H PRN Rachel Brizuela MD        ipratropium-albuterol (DUONEB) nebulizer solution 1 ampule  1 ampule Inhalation Q4H WA Rachel Brizuela MD   1 ampule at 02/16/20 1048    insulin lispro (HUMALOG) injection vial 0-12 Units  0-12 Units Subcutaneous Q4H Rachel Brizuela MD   Stopped at 02/16/20 1046    insulin lispro (HUMALOG) injection vial 0-6 Units  0-6 Units Subcutaneous Nightly Rachel Brizuela MD   6 abused: Not on file     Physically abused: Not on file     Forced sexual activity: Not on file   Other Topics Concern    Not on file   Social History Narrative    Not on file         Review of Systems:  History obtained from chart review and the patient  General ROS: No fever or chills  Respiratory ROS: No cough, shortness of breath, wheezing  Cardiovascular ROS: No chest pain or palpitations  Gastrointestinal ROS: No abdominal pain or melena  Genito-Urinary ROS: No dysuria or hematuria  Musculoskeletal ROS: No joint pain or swelling   14 point ROS reviewed with the patient and negative except as noted above and in the HPI unless unable to obtain.     Objective:  Patient Vitals for the past 24 hrs:   BP Temp Temp src Pulse Resp SpO2 Height Weight   02/16/20 1048 -- -- -- -- 17 (!) 86 % -- --   02/16/20 0800 125/66 96.8 °F (36 °C) Temporal 75 16 94 % -- --   02/16/20 0700 131/68 -- -- 79 (!) 45 93 % -- --   02/16/20 0631 -- -- -- -- 20 -- -- --   02/16/20 0630 131/68 -- -- 77 15 96 % -- --   02/16/20 0530 139/69 -- -- 75 17 95 % -- 170 lb 3.2 oz (77.2 kg)   02/16/20 0500 89/73 -- -- 79 22 91 % -- --   02/16/20 0430 135/68 -- -- 79 25 90 % -- --   02/16/20 0400 132/68 97 °F (36.1 °C) Temporal 77 17 92 % -- --   02/16/20 0330 -- -- -- 82 -- -- -- --   02/16/20 0230 139/76 -- -- 81 19 92 % -- --   02/16/20 0200 (!) 140/61 -- -- 79 17 92 % -- --   02/16/20 0130 136/70 -- -- 78 22 93 % -- --   02/16/20 0030 132/67 -- -- 78 13 95 % -- --   02/16/20 0000 120/69 97.1 °F (36.2 °C) Temporal 75 17 95 % -- --   02/15/20 2330 128/69 -- -- 78 18 95 % -- --   02/15/20 2300 (!) 143/59 -- -- 79 19 94 % -- --   02/15/20 2230 (!) 141/68 -- -- 84 17 94 % -- --   02/15/20 2200 (!) 142/64 -- -- 80 (!) 40 95 % -- --   02/15/20 2130 (!) 148/88 -- -- 86 21 97 % -- --   02/15/20 2118 -- -- -- -- 28 (!) 83 % -- --   02/15/20 2100 137/70 -- -- 85 17 (!) 88 % -- --   02/15/20 2041 -- -- -- 83 -- -- -- --   02/15/20 2032 -- -- -- -- 27 -- -- -- 02/15/20 2031 138/63 97.7 °F (36.5 °C) Temporal 83 17 (!) 89 % 5' 5\" (1.651 m) 169 lb 14.4 oz (77.1 kg)   02/15/20 2030 -- -- -- 83 -- -- -- --       Intake/Output Summary (Last 24 hours) at 2/16/2020 1123  Last data filed at 2/16/2020 0800  Gross per 24 hour   Intake 1283.33 ml   Output 225 ml   Net 1058.33 ml     General: awake/alert   Chest:  clear to auscultation bilaterally without respiratory distress  CVS: regular rate and rhythm  Abdominal: soft, nontender, normal bowel sounds  Extremities: no cyanosis or edema  Skin: warm and dry without rash      Labs:  BMP:   Recent Labs     02/15/20  2145 02/16/20  0120    142   K 4.7  4.7 4.8    106   CO2 20* 19*   PHOS 4.9*  --    BUN 66* 68*   CREATININE 5.7* 6.1*   CALCIUM 7.7* 7.9*     CBC:   Recent Labs     02/15/20  2145 02/16/20  0120   WBC 4.9 5.0   HGB 9.2* 8.8*   HCT 29.0* 28.4*   MCV 83.6 84.5    135     LIVER PROFILE:   Recent Labs     02/15/20  2145   AST 40   ALT 38   BILITOT 0.3   ALKPHOS 63     PT/INR:   Recent Labs     02/15/20  2145   PROTIME 57.2*   INR 6.62*     APTT:   Recent Labs     02/15/20  2145   APTT 65.0*     BNP:  No results for input(s): BNP in the last 72 hours. Ionized Calcium:No results for input(s): IONCA in the last 72 hours. Magnesium:  Recent Labs     02/15/20  2145   MG 2.6     Phosphorus:  Recent Labs     02/15/20  2145   PHOS 4.9*     HgbA1C:   Recent Labs     02/15/20  2145   LABA1C 7.4*     Hepatic:   Recent Labs     02/15/20  2145   ALKPHOS 63   ALT 38   AST 40   PROT 6.4*   BILITOT 0.3   LABALBU 2.6*     Lactic Acid:   Recent Labs     02/15/20  2145   LACTA 1.1     Troponin: No results for input(s): CKTOTAL, CKMB, TROPONINT in the last 72 hours. ABGs: No results for input(s): PH, PCO2, PO2, HCO3, O2SAT in the last 72 hours. CRP:  No results for input(s): CRP in the last 72 hours. Sed Rate:  No results for input(s): SEDRATE in the last 72 hours.       Cultures:   No results for input(s): CULTURE in the last 72 hours. No results for input(s): BC, [place]ap in the last 72 hours. No results for input(s): CXSURG in the last 72 hours. Radiology reports as per the Radiologist  Radiology: Us Renal Complete    Result Date: 2/16/2020  EXAMINATION: US RENAL COMPLETE 2/16/2020 11:00 AM HISTORY: Acute kidney injury COMPARISON: None FINDINGS: Transabdominal sonographic evaluation of the kidneys and urinary bladder was performed in the transverse and longitudinal projections. The right kidney appears normal in size and echogenicity, measuring 10.6 x 5.2 x 5.9 cm in size. There is no sign of collecting system dilatation or solid renal mass. The left kidney appears normal in size and echogenicity, measuring 12.9 x 5.8 x 4.5 cm in size. There is no sign of collecting system dilatation or solid renal mass. The urinary bladder is decompressed around a [place] catheter. There is trace free fluid in the pelvis. Negative renal ultrasound. Trace free fluid in the pelvis. Signed by Dr Sarah Gutierrez on 2/16/2020 11:01 AM    Xr Chest 1 Vw    Result Date: 2/15/2020  XR CHEST 1 VIEW 2/15/2020 9:21 PM History: Short of breath. Portable chest x-ray compared with 7/17/2014. Right greater than left bilateral infiltrate compatible with diffuse pneumonia. Normal heart and mediastinum. No pneumothorax. 1. Right greater than left bilateral infiltrate compatible with diffuse pneumonia. 2. Follow-up to document resolution and rule out an underlying mass will be needed.  Signed by Dr Wilmar Esqueda on 2/15/2020 9:22 PM       Assessment   Acute renal failure  Acute on chronic hypoxic respiratory failure  Pneumonia  History of fetal alcohol syndrome  Anemia  Metabolic acidosis    Plan:  High FENa May be related to diuretic usage  Work-up ordered ongoing  Discussed with nursing, patient  Will need clarification from decision making case dialysis required      Thank you for the consult, we appreciate the opportunity to provide care to your patients. Feel free to contact me if I can be of any further assistance.       Mildred Miller MD  02/16/20  11:23 AM

## 2020-02-16 NOTE — PLAN OF CARE
Problem: Falls - Risk of:  Goal: Will remain free from falls  Description  Will remain free from falls  2/16/2020 1146 by Tracy Bonner RN  Outcome: Ongoing  2/16/2020 0716 by Hal Morse RN  Outcome: Ongoing  Goal: Absence of physical injury  Description  Absence of physical injury  2/16/2020 1146 by Tracy Bonner RN  Outcome: Ongoing  2/16/2020 0716 by Hal Morse RN  Outcome: Ongoing     Problem: Risk for Impaired Skin Integrity  Goal: Tissue integrity - skin and mucous membranes  Description  Structural intactness and normal physiological function of skin and  mucous membranes.   2/16/2020 1146 by Tracy Bonner RN  Outcome: Ongoing  2/16/2020 0716 by Hal Morse RN  Outcome: Ongoing     Problem: Respiratory Function - Compromised  Goal: Ability to maintain adequate oxygenation will improve  Description  Ability to maintain adequate oxygenation will improve     2/16/2020 1146 by Tracy Bonner RN  Outcome: Ongoing  2/16/2020 0716 by Hal Morse RN  Outcome: Ongoing  Goal: Oxygen saturation during activity within specified parameters  2/16/2020 1146 by Tracy Bonner RN  Outcome: Ongoing  2/16/2020 0716 by Hal Morse RN  Outcome: Ongoing     Problem: FLUID AND ELECTROLYTE IMBALANCE  Goal: Fluid and electrolyte balance are achieved/maintained  Outcome: Ongoing

## 2020-02-16 NOTE — PROGRESS NOTES
4 Eyes Skin Assessment    Niels Councilman is being assessed upon: room 144    I agree that I, Annabelle Trejo, along with Kaya Pires, RN have performed a thorough Head to Toe Skin Assessment on the patient. ALL assessment sites listed below have been assessed. Areas assessed by both nurses:     [x]   Head, Face, and Ears   [x]   Shoulders, Back, and Chest  [x]   Arms, Elbows, and Hands   [x]   Coccyx, Sacrum, and Ischium  [x]   Legs, Feet, and Heels    Does the Patient have Skin Breakdown?  NO   Except excoriation to sacrum, bruises to arms, and redness to heel  Yvon Prevention initiated: Yes  Wound Care Orders initiated: No    WOC nurse consulted for Pressure Injury (Stage 3,4, Unstageable, DTI, NWPT, and Complex wounds) and New or Established Ostomies: NA        Primary Nurse eSignature: Annabelle Trejo RN on 2/15/2020 at 8:53 PM      Co-Signer eSignature: Electronically signed by Kaya Pires RN on 2/16/20 at 2:53 AM

## 2020-02-16 NOTE — PROGRESS NOTES
Spoke to Anisha Simon RN at [facility] where pt is a resident. She states that pt is his own guardian on file and does not have a state appointed guardian. Reports he is able to communicate needs and is oriented at baseline. She states that his \"family doesn't have a clue about him\" and are relatively estranged.     Electronically signed by Saskia Ziegler RN on 2/16/2020 at 9:17 AM

## 2020-02-16 NOTE — PROGRESS NOTES
Juan José Greerers arrived to room # 144  Presented with: decreased LOC, sepsis, pneumonia  Mental Status: Patient is only moans to pain  Vitals:    02/15/20 2041   BP:    Pulse: 83   Resp:    Temp:    SpO2:      Patient safety contract and falls prevention contract reviewed with patient No (pt with decreased LOC)  Oriented: Unable to orient to room r/t decreased LOCt. Call light within reach.  Unable to use  Needs, issues or concerns expressed at this: unable to due to decreased LOC      Electronically signed by Keerthi Edwards RN on 2/15/2020 at 8:43 PM

## 2020-02-16 NOTE — PROGRESS NOTES
Recent Labs     02/15/20  2145 02/16/20  0120   BUN 66* 68*       Recent Labs     02/15/20  2145 02/16/20  0120   CREATININE 5.7* 6.1*       Estimated Creatinine Clearance: 12 mL/min (A) (based on SCr of 6.1 mg/dL (H)).       Plan: Changed Merrem 500mg Q8hr to 500mg Q12hr due to decline in patients renal function    Electronically signed by Melly Gordon, [address] [place] [address] on 2/16/2020 at 3:39 AM

## 2020-02-16 NOTE — H&P
Rudy Vega - History & Physical      PCP: No primary care provider on file. Date of Admission: 2/15/20   Date of Service: 2/15/2020    Chief Complaint:  Lethargy, worsening of renal failure     History Of Present Illness: The patient is a 61 y.o. male with PMH of ETOH fetal syndrome, DM,afib, on eliquis  apparently presented from [facility] about 5 days ago with sepsis weakness ? Pneumonia, was given Cr was 1.97 , started declining today transferred CR was up to 5.6 was given IVF , worsening of respiratory failure , urine output declined was given bumex, without any response, today was very lethargic, he was on and off bipap, ABG from yesterday PH 7.25 ,PO2 64  PCO33 , bicarb 14.5 on bipap %36  16/10  family does not talk to him , and [place] , there was no repeat ABG done after the bipap since yesterday and no head CT done for MS change , it was reported that patient was arousable but lethargic   Past Medical History:    No past medical history on file. Past Surgical History:    No past surgical history on file. Home Medications:  Prior to Admission medications    Not on File       Allergies:    Patient has no allergy information on record. Social History:      Tobacco:   has no history on file for tobacco.  Alcohol:   has no history on file for alcohol. Illicit Drugs: no     Family History:  No family history on file. Review of Systems:   Unable to obtain lethargic     Physical Examination:       There were no vitals taken for this visit. General appearance: lethargic BiPAP on wakes up to sternal rubs and  goes back to sleep   HEENT: eyes are closed, bipap on   Neck: Supple, with full range of motion. No jugular venous distention. Trachea midline. Thyroid no masses noted. Respiratory: occasional wheezing   Cardiovascular: IRRegular rate and rhythm with normal S1/S2 without murmurs, rubs or gallops.    Abdomen: Soft, non-tender, non-distended with normal bowel sounds. Musculoskeletal: No clubbing, cyanosis or edema bilaterally. Full range of motion without deformity in 4 extremities. Neurologic: lethargic   Psychiatric:lethargic     Diagnostic Data:  Imaging:   No orders to display     CBC:No results for input(s): WBC, HGB, HCT, PLT in the last 72 hours. BMP:No results for input(s): NA, K, CL, CO2, BUN, CREATININE, CALCIUM, PHOS in the last 72 hours. Invalid input(s): Dina Black results for input(s): AST, ALT, BILIDIR, BILITOT, ALKPHOS in the last 72 hours. Coag Panel: No results for input(s): INR, PROTIME, APTT in the last 72 hours. Cardiac Enzymes: No results for input(s): Wilfredo Bainsman in the last 72 hours. ABGs:No results found for: PHART, PO2ART, [id number]  Urinalysis:  Lab Results   Component Value Date    NITRU NEGATIVE 08/09/2014    WBCUA 1 08/09/2014    BACTERIA NEGATIVE 08/09/2014    RBCUA 2 08/09/2014    GLUCOSEU NEGATIVE 08/09/2014       There are no active hospital problems to display for this patient. Assessment and Plan: Active Problems:    * No active hospital problems. *  Resolved Problems:    * No resolved hospital problems.  *    Acute on chronic hypoxemic respiratory failure 2 to pneumonia: admit, was on bipap continue merrem, will add vanco, BC at OSH was -ve, consult pulmonary, repeat ABG much better noted   Acute on CKD cr up to 5.6, US renal, consult nephrology  H/o ETOH fetal syndrome  H/o afib check BNP, echo   MS change we could not do head CT now because he deteriorates every time we take off his bipap, will do CT in am   DVT prophylaxis was on buffy  Will hold [facility] until head CT shows no [address]  Hospitalist service  2/15/2020  7:38 PM

## 2020-02-16 NOTE — PROGRESS NOTES
Spoke to staff at [facility]. She indicated that at baseline pt is alert and oriented to self, place and situation. Can verbalize needs. Eats regular diet and can feed self. Uses wheel chair with transfer of one assist. He had fallen once in the past month but could not remember when or what happened. Pt on daily Elequis for Afib. Does have a cousin who we can reach at [phone].

## 2020-02-16 NOTE — PROGRESS NOTES
Spoke to next-of-kin on file, Kasandra Real? Carley Collins (brother) of patient. He states that he and the patient are relatively estranged but to his knowledge the pt lived with his Grandma up until her passing two years ago. Since that time, he is unsure of how the patient ended up in [address] [place] [address]. He states he was under the impression the state was making decisions for him, due to the fact that Caroline Francisco has a mental disability of about a twelve year old\". Kasandra Baugh provided the phone number for a Nehemiah Destiny Collins, the pt's father. [phone]. This RN will attempt to contact for dialysis consent. Electronically signed by Puja James RN on 2/16/2020 at 10:20 AM      Yeyo Patterson, no answer. Voicemail left with phone number to return call.     Electronically signed by Puja James RN on 2/16/2020 at 10:23 AM

## 2020-02-16 NOTE — PROGRESS NOTES
Pharmacy Note  Vancomycin Consult    Radha Mann is a 61 y.o. male started on Vancomycin for Pneumonia; consult received from Dr. Mimi Pineda to manage therapy. Also receiving the following antibiotics: Merrem. Patient Active Problem List   Diagnosis    Acute hypoxemic respiratory failure (HCC)       Allergies:  Chlorpromazine hcl; Hydrocodone; Nabumetone; Nsaids; Oxaprozin; Oxycodone; Prochlorperazine; and Thorazine [chlorpromazine]     Temp max: 97.7    Recent Labs     02/15/20  2145 02/16/20  0120   BUN 66* 68*       Recent Labs     02/15/20  2145 02/16/20  0120   CREATININE 5.7* 6.1*       Recent Labs     02/15/20  2145 02/16/20  0120   WBC 4.9 5.0         Intake/Output Summary (Last 24 hours) at 2/16/2020 0022  Last data filed at 2/16/2020 0220  Gross per 24 hour   Intake 683.33 ml   Output 125 ml   Net 558.33 ml     No current cultures at this time  Culture Date Source Results                      Ht Readings from Last 1 Encounters:   02/15/20 5' 5\" (1.651 m)        Wt Readings from Last 1 Encounters:   02/15/20 169 lb 14.4 oz (77.1 kg)         Body mass index is 28.27 kg/m². Estimated Creatinine Clearance: 12 mL/min (A) (based on SCr of 6.1 mg/dL (H)). Assessment/Plan:  Will initiate vancomycin as Pulse Dosing due to decline in Patients renal function. Timing of trough level will be determined based on culture results, renal function, and clinical response. Thank you for the consult. Will continue to follow.     Electronically signed by Claudia Saez [employer] on 2/16/2020 at 3:36 AM

## 2020-02-16 NOTE — PROGRESS NOTES
Subjective:   Critical Care Daily Progress Note: 2/16/2020 12:30 PM    Interval History:   [id number]:  Mr. Negrito Ellison is a mentally handicapped 61year old  ameican man and fetal alcohol syndrome victim. He had been transferred to us for worsening oxygenation necessitating BiPAP for NIPPV suppport as well as alteration of mental status in addition to the originally stated reason of needing nephrology evaluation. Upon searching for further information my colleague discovered that the [facility]ital where the patient had been has an ICU in addition to intensivist support on site. When he was calling them for further information, he was also unable to obtain greater detail as to ABx therapy. He had added a CT scan to my initial plan as per the AMS they revealed to him later in the day when he called, this did not reveal a cause of any alteration to us. He has as per his RN team improved as to his alertness, but we are unaware of what his baseline cognitive function is. He reportedly has the faculties of a 15year old. [id number]: (copied from Dr. Ronnell Carlson admission note)  \"The patient is a 61 y.o. male with PMH of ETOH fetal syndrome, DM,afib, on eliquis  apparently presented from [facility] about 5 days ago with sepsis weakness ?  Pneumonia, was given Cr was 1.97 , started declining today transferred CR was up to 5.6 was given IVF , worsening of respiratory failure , urine output declined was given bumex, without any response, today was very lethargic, he was on and off bipap, ABG from yesterday PH 7.25 ,PO2 64  PCO33 , bicarb 14.5 on bipap %36  16/10  family does not talk to him , and he is a [place] of the [facility] , there was no repeat ABG done after the bipap since yesterday and no head CT done for MS change , it was reported that patient was arousable but lethargic\"  [id number]: (signout from transfer center and attending physician at OSH received by me)  \"As per transfer center \"pneumoniae\" \"needs ICU\" but unable to ill-appearing or toxic-appearing. HENT:      Head: Normocephalic and atraumatic. Nose: No congestion or rhinorrhea. Eyes:      General:         Right eye: No discharge. Left eye: No discharge. Neck:      Musculoskeletal: Neck supple. Comments: Trachea appears midline  Cardiovascular:      Rate and Rhythm: Normal rate and regular rhythm. Heart sounds: No murmur. No friction rub. No gallop. Pulmonary:      Effort: Pulmonary effort is normal. No respiratory distress. Breath sounds: No stridor. Wheezing and rhonchi present. No rales. Comments: Breathing on BiPAP  Chest:      Chest wall: No tenderness. Abdominal:      General: Bowel sounds are normal.      Tenderness: There is no abdominal tenderness. There is no guarding or rebound. Skin:     General: Skin is warm. Comments: nondiaphoretic   Neurological:      Mental Status: He is alert. Comments: No tremors       LABS:  Results for Estelle Gan (MRN [id number]) as of 2/16/2020 12:31   Ref.  Range 2/15/2020 21:30 2/15/2020 21:45 2/15/2020 21:45 2/16/2020 00:18 2/16/2020 01:20 2/16/2020 05:08 2/16/2020 10:44 2/16/2020 11:52   Sodium Latest Ref Range: 136 - 145 mmol/L  141   142      Potassium Latest Ref Range: 3.5 - 5.0 mmol/L  4.7 4.7  4.8      Chloride Latest Ref Range: 98 - 111 mmol/L  104   106      CO2 Latest Ref Range: 22 - 29 mmol/L  20 (L)   19 (L)      BUN Latest Ref Range: 6 - 20 mg/dL  66 (H)   68 (H)      Creatinine Latest Ref Range: 0.5 - 1.2 mg/dL  5.7 (H)   6.1 (H)      Anion Gap Latest Ref Range: 7 - 19 mmol/L  17   17      GFR Non- Latest Ref Range: >60   10 (A)   9 (A)      Magnesium Latest Ref Range: 1.6 - 2.6 mg/dL  2.6         Lactic Acid Latest Ref Range: 0.5 - 1.9 mmol/L  1.1         Glucose Latest Ref Range: 74 - 109 mg/dL  408 (H)   376 (H)      POC Glucose Latest Ref Range: 70 - 99 mg/dl    413 (H)  254 (H) 127 (H)    Calcium Latest Ref Range: 8.6 - 10.0 mg/dL  7.7 (L)   7.9 (L)      Phosphorus Latest Ref Range: 2.5 - 4.5 mg/dL  4.9 (H)         Total Protein Latest Ref Range: 6.6 - 8.7 g/dL  6.4 (L)         Troponin Latest Ref Range: 0.00 - 0.03 ng/mL  0.02         Albumin Latest Ref Range: 3.5 - 5.2 g/dL  2.6 (L)         Alk Phos Latest Ref Range: 40 - 130 U/L  63         ALT Latest Ref Range: 5 - 41 U/L  38         AST Latest Ref Range: 5 - 40 U/L  40         Bilirubin Latest Ref Range: 0.2 - 1.2 mg/dL  0.3         Hemoglobin A1C Latest Ref Range: 4.0 - 6.0 %  7.4 (H)         WBC Latest Ref Range: 4.8 - 10.8 K/uL  4.9   5.0      RBC Latest Ref Range: 4.70 - 6.10 M/uL  3.47 (L)   3.36 (L)      Hemoglobin Quant Latest Ref Range: 14.0 - 18.0 g/dL  9.2 (L)   8.8 (L)      Hematocrit Latest Ref Range: 42.0 - 52.0 %  29.0 (L)   28.4 (L)      MCV Latest Ref Range: 80.0 - 94.0 fL  83.6   84.5      MCH Latest Ref Range: 27.0 - 31.0 pg  26.5 (L)   26.2 (L)      MCHC Latest Ref Range: 33.0 - 37.0 g/dL  31.7 (L)   31.0 (L)      MPV Latest Ref Range: 9.4 - 12.4 fL  11.4   11.3      RDW Latest Ref Range: 11.5 - 14.5 %  17.2 (H)   17.2 (H)      Platelet Count Latest Ref Range: 130 - 400 K/uL  133   135      Neutrophils % Latest Ref Range: 50.0 - 65.0 %  84.0 (H)   87.0 (H)      Lymphocyte % Latest Ref Range: 20.0 - 40.0 %  9.0 (L)   6.0 (L)      Monocytes % Latest Ref Range: 0.0 - 10.0 %  2.0   0.0      Eosinophils % Latest Ref Range: 0.0 - 5.0 %  0.0   0.0      Basophils % Latest Ref Range: 0.0 - 1.0 %  0.0   0.0      Neutrophils Absolute Latest Ref Range: 1.5 - 7.5 K/uL  4.3   4.7      Immature Granulocytes # Latest Units: K/uL  0.4   0.5      Lymphocytes Absolute Latest Ref Range: 1.1 - 4.5 K/uL  0.5 (L)   0.4 (L)      Monocytes Absolute Latest Ref Range: 0.00 - 0.90 K/uL  0.10   0.00      Eosinophils Absolute Latest Ref Range: 0.00 - 0.60 K/uL  0.00   0.00      Basophils Absolute Latest Ref Range: 0.00 - 0.20 K/uL  0.00   0.00      Bands Relative Latest Ref Range: 0 - 5 %  4   6 (H)

## 2020-02-17 PROBLEM — Q86.0 FETAL ALCOHOL SYNDROME: Status: ACTIVE | Noted: 2020-01-01

## 2020-02-17 PROBLEM — N18.30 STAGE 3 CHRONIC KIDNEY DISEASE (HCC): Status: ACTIVE | Noted: 2020-01-01

## 2020-02-17 PROBLEM — N17.0 ACUTE RENAL FAILURE WITH TUBULAR NECROSIS (HCC): Status: ACTIVE | Noted: 2020-01-01

## 2020-02-17 NOTE — PROGRESS NOTES
Systems: Review of Systems   Unable to perform ROS: Mental status change     Physical Exam:  Blood pressure (!) 140/72, pulse 101, temperature 97 °F (36.1 °C), temperature source Temporal, resp. rate 21, height 5' 5\" (1.651 m), weight 173 lb 9.6 oz (78.7 kg), SpO2 94 %. Intake/Output Summary (Last 24 hours) at 2/17/2020 0844  Last data filed at 2/17/2020 0600  Gross per 24 hour   Intake 900 ml   Output 175 ml   Net 725 ml     Physical Exam  Vitals signs and nursing note reviewed. Constitutional:       General: He is awake. Appearance: He is ill-appearing. HENT:      Head: Normocephalic and atraumatic. Comments: Pap in place, O2 sat 94%     Nose: Nose normal.   Eyes:      Conjunctiva/sclera: Conjunctivae normal.      Pupils: Pupils are equal, round, and reactive to light. Neck:      Musculoskeletal: Normal range of motion and neck supple. Cardiovascular:      Rate and Rhythm: Regular rhythm. Heart sounds: No murmur. No friction rub. No gallop. Pulmonary:      Breath sounds: Normal breath sounds. No wheezing, rhonchi or rales. Abdominal:      General: There is no distension. Palpations: Abdomen is soft. Musculoskeletal:         General: No swelling or deformity. Skin:     General: Skin is warm and dry. Neurological:      Mental Status: He is confused.    Psychiatric:         Behavior: Behavior normal.       Recent Labs     02/15/20  2145 02/16/20  0120 02/17/20  0150   WBC 4.9 5.0 5.8   RBC 3.47* 3.36* 3.21*   HGB 9.2* 8.8* 8.5*   HCT 29.0* 28.4* 26.2*    135 164   MCV 83.6 84.5 81.6   MCH 26.5* 26.2* 26.5*   MCHC 31.7* 31.0* 32.4*   RDW 17.2* 17.2* 17.2*   NRBC  --   --  1*   BANDSPCT 4 6*  --       Recent Labs     02/15/20  2117  02/15/20  2145 02/16/20  0120 02/16/20  1616 02/17/20  0150 02/17/20  0841   NA  --   --  141 142  --  142  --    K 4.7   < > 4.7  4.7 4.8  --  4.5 4.7   CL  --   --  104 106  --  104  --    CO2  --   --  20* 19*  --  20*  --    BUN  --   -- 66* 68*  --  82*  --    CREATININE  --   --  5.7* 6.1*  --  6.6*  --    CALCIUM  --   --  7.7* 7.9*  --  7.7*  --    GLUCOSE  --   --  408* 376*  --  160*  --    PHART 7.310*  --   --   --   --   --  7.280*   [id number] 41.0  --   --   --   --   --  42.0   PO2ART 75.0*  --   --   --   --   --  64.0*   ZUL5GBU 20.6*  --   --   --   --   --  19.7*   [id number] 95.0  --   --   --   --   --  91.9   BEART -5.3*  --   --   --   --   --  -6.6*   AST  --   --  40  --   --   --   --    ALT  --   --  38  --   --   --   --    ALKPHOS  --   --  63  --   --   --   --    BILITOT  --   --  0.3  --   --   --   --    MG  --    < > 2.6  --   --  2.4  --    PHOS  --    < > 4.9*  --   --  4.7*  --    TROPONINI  --   --  0.02  --   --   --   --    LACTA  --   --  1.1  --   --   --   --    INR  --    < > 6.62*  --  6.64*  --   --     < > = values in this interval not displayed. Recent Labs     02/15/20  2145   BC No Growth to date. Any change in status will be called. Radiograph: None   My radiograph interpretation: No new imaging  Pulmonary Assessment:    1. Pneumonia with acute hypoxic respiratory failure  2. Acute renal failure on chronic kidney disease  3. Atrial fib  4. History of fetal alcohol syndrome  5. Altered mental status, suspect likely metabolic encephalopathy  6. Hypothyroidism on levothyroxine  7. History of iron deficiency and hypovitaminosis  8. Hyperlipidemia     Recommend:   · Continue BiPAP and with increase of oxygen supplementation from 15 L to 20 L/min we will check a chest x-ray and ABGs this morning. · Swab was negative  · Cultures NGTD  · No sputum culture obtained we will continue to monitor  · BONY of the naris positive  · Continue duo nebs  · New supplemental oxygen for sats 90 to 95%  · Patient received his dialysis line with plans for hemodialysis today. Electronically signed by Andreas Perez on 2/17/2020 at 8:44 AM    Physician Substantive portion:  He has had a difficult day.   The patient was seen this morning and then I saw him in the afternoon also. There is a history of fetal alcohol syndrome, [place], respiratory failure. He is dialyzed today which he needed due to combination of hypoxemia and acidosis despite BiPAP. Currently he is nonverbal, on BiPAP. He does not appear distressed. There is no accessory muscle use. Nursing reports that he was having trouble through dialysis with some tachycardia and that is better now and he has done better since then. I have increased his BiPAP settings to 20/12. This is probably about as high as we can go on this short of intubating him. Exam shows mild crackles in both bases, good excursion. Respiratory paradox is absent at the time of my visit in the afternoon. Appears to be responding to dialysis and general support. We will keep the BiPAP at the higher settings. Follow-up chest x-ray in the morning. Follow-up dialysis likely tomorrow. I have seen and examined patient personally, performing a face-to-face diagnostic evaluation with plan of care reviewed and developed with APRN and nursing staff. I have addended and/or modified the above history of present illness, physical examination, and assessment and plan to reflect my findings and impressions. Essential elements of the care plan were discussed with APRN above. Agree with findings and assessment/plan as documented above.     Electronically signed by Jose Antonio Miller, on 2/17/2020, 5:11 PM

## 2020-02-17 NOTE — PROGRESS NOTES
Called and spoke with patient's father, Nuha Antony, and informed him that Manpreet Coffey,  was unable to find anything indicating that the patient is a [place]. I explained that this makes him the decision maker, being his next of kin. He was unsure of the patient's wishes and stated that they haven't talked in many years and the patient \"wanted nothing to do with his family\". I explained the seriousness of the patient's condition and the worsening respiratory status. I explained there was a strong possibility of the patient requiring intubation. He understood but was very unsure of what he should do. I explained that the patient is a full code at this time and that if anything was to need to be done to improve his respiratory or cardiac status, it would be done. I also explained that if he and his family changed their mind about code status to call and let us know. Updated Manpreet Coffey and Dr. Zoran Tian. Patient is at this time on BiPAP 20/12 with 30L O2. In a-fib RVR with rate in 130's.

## 2020-02-17 NOTE — PROGRESS NOTES
Results for Kelli Garcia (MRN [id number]) as of 2/17/2020 08:42   Ref.  Range 2/17/2020 08:41   Hemoglobin, Art, Extended Latest Ref Range: 14.0 - 18.0 g/dL 8.3 (L)   pH, Arterial Latest Ref Range: 7.350 - 7.450  7.280 (LL)   pCO2, Arterial Latest Ref Range: 35.0 - 45.0 mmHg 42.0   pO2, Arterial Latest Ref Range: 80.0 - 100.0 mmHg 64.0 (L)   HCO3, Arterial Latest Ref Range: 22.0 - 26.0 mmol/L 19.7 (L)   Base Excess, Arterial Latest Ref Range: -2.0 - 2.0 mmol/L -6.6 (L)   O2 Sat, Arterial Latest Ref Range: >92 % 91.9   O2 Content, Arterial Latest Ref Range: Not Established mL/dL 10.8   Methemoglobin, Arterial Latest Ref Range: <1.5 % 1.6   Carboxyhgb, Arterial Latest Ref Range: 0.0 - 5.0 % 2.4   16/10 bipap with 20lpm oxygen  ABG at RR

## 2020-02-17 NOTE — PROGRESS NOTES
ferrous sulfate tablet 325 mg  325 mg Oral Daily with breakfast Demetrice Valdivia MD        fluticasone [facility]) 50 MCG/ACT nasal spray 1 spray  1 spray Each Nostril BID Demetrice Valdivia MD   1 spray at 33/70/19 0935    folic acid-pyridoxine-cyancobalamin (FOLTX) 2.5-25-2 MG TABS 1 tablet  1 tablet Oral Daily Demetrice Valdivia MD        levothyroxine (SYNTHROID) tablet 25 mcg  25 mcg Oral Daily Demetrice Valdivia MD        lactobacillus (CULTURELLE) capsule 1 capsule  1 capsule Oral Daily Demetrice Valdivia MD        sodium chloride flush 0.9 % injection 10 mL  10 mL Intravenous 2 times per day Demetrice Valdivia MD   Stopped at 02/16/20 0858    sodium chloride flush 0.9 % injection 10 mL  10 mL Intravenous PRN Demetrice Valdivia MD        ondansetron [facility]) injection 4 mg  4 mg Intravenous Q6H PRN Demetrice Valdivia MD        bisacodyl (DULCOLAX) suppository 10 mg  10 mg Rectal Daily PRN Demetrice Valdivia MD        naloxone [facility]) injection 0.4 mg  0.4 mg Intravenous PRN Demetrice Valdivia MD        acetaminophen (TYLENOL) tablet 650 mg  650 mg Oral Q4H PRN Demetrice Valdivia MD        albuterol (PROVENTIL) nebulizer solution 2.5 mg  2.5 mg Nebulization Q1H PRN Demetrice Valdivia MD        glucose (GLUTOSE) 40 % oral gel 15 g  15 g Oral PRN Demetrice Valdivia MD        dextrose 50 % IV solution  12.5 g Intravenous PRN Demetrice Valdivia MD        glucagon (rDNA) injection 1 mg  1 mg Intramuscular PRN Demetrice Valdivia MD        dextrose 5 % solution  100 mL/hr Intravenous PRN Demetrice Valdivia MD        sodium chloride flush 0.9 % injection 10 mL  10 mL Intravenous 2 times per day Linn Amaya MD   Stopped at 02/16/20 0858    sodium chloride flush 0.9 % injection 10 mL  10 mL Intravenous PRN Linn Amaya MD        ondansetron [facility]) injection 4 mg  4 mg Intravenous Q6H PRN Linn Amaya MD        sodium bicarbonate 50 mEq in sodium chloride 0.45 % 1,000 mL infusion   Intravenous Continuous Demetrice Valdivia MD 50 mL/hr at 02/17/20 0031      140/72 -- -- 101 22 90 % --   02/17/20 0630 (!) 159/71 -- -- 102 26 (!) 89 % --   02/17/20 0620 -- -- -- -- 23 91 % --   02/17/20 0600 (!) 166/80 97 °F (36.1 °C) Temporal 98 24 91 % 173 lb 9.6 oz (78.7 kg)   02/17/20 0530 (!) 148/64 -- -- 94 22 90 % --   02/17/20 0500 -- -- -- 95 23 90 % --   02/17/20 0430 (!) 142/69 -- -- 92 18 91 % --   02/17/20 0425 -- -- -- -- -- 90 % --   02/17/20 0420 -- -- -- -- -- 90 % --   02/17/20 0415 -- -- -- -- -- 90 % --   02/17/20 0410 -- -- -- -- -- 90 % --   02/17/20 0405 -- -- -- -- -- 90 % --   02/17/20 0400 (!) 142/93 97.2 °F (36.2 °C) Temporal 92 21 90 % --   02/17/20 0335 131/69 -- -- 90 18 90 % --   02/17/20 0300 95/82 -- -- 90 16 90 % --   02/17/20 0230 (!) 148/77 -- -- 95 19 91 % --   02/17/20 0200 (!) 145/70 -- -- 89 16 91 % --   02/17/20 0130 (!) 151/69 97.1 °F (36.2 °C) Temporal 92 18 92 % --   02/17/20 0100 (!) 140/72 -- -- 88 17 92 % --   02/17/20 0030 (!) 142/71 -- -- 91 20 92 % --   02/17/20 0000 126/74 97.2 °F (36.2 °C) Temporal 88 22 91 % --   02/16/20 2330 (!) 97/58 -- -- 85 15 93 % --   02/16/20 2300 (!) 141/83 -- -- 86 15 95 % --   02/16/20 2230 138/64 -- -- 87 21 95 % --   02/16/20 2200 (!) 140/68 97 °F (36.1 °C) Temporal 85 20 94 % --   02/16/20 2130 (!) 141/71 -- -- 83 29 93 % --   02/16/20 2100 128/64 -- -- 82 21 91 % --   02/16/20 2000 134/69 -- -- 86 23 93 % --   02/16/20 1930 137/63 -- -- 82 16 93 % --   02/16/20 1900 132/68 96.8 °F (36 °C) Temporal 84 21 90 % --   02/16/20 1821 -- -- -- -- 27 -- --   02/16/20 1819 -- -- -- -- 17 95 % --   02/16/20 1800 131/66 -- -- 81 13 94 % --   02/16/20 1700 120/67 -- -- 80 15 92 % --   02/16/20 1600 129/69 97 °F (36.1 °C) Temporal 82 (!) 36 90 % --   02/16/20 1500 137/70 -- -- 86 15 91 % --   02/16/20 1400 (!) 141/76 -- -- 83 (!) 48 95 % --   02/16/20 1300 134/74 -- -- 80 18 95 % --   02/16/20 1200 129/71 97 °F (36.1 °C) Tympanic 80 16 91 % --   02/16/20 1100 (!) 143/96 -- -- 83 24 (!) 85 % --   02/16/20 1048 -- -- -- hours.  ABGs: No results for input(s): PH, PCO2, PO2, HCO3, O2SAT in the last 72 hours. CRP:  No results for input(s): CRP in the last 72 hours. Sed Rate:  No results for input(s): SEDRATE in the last 72 hours. Cultures:   No results for input(s): CULTURE in the last 72 hours. Recent Labs     02/15/20  2145 02/16/20  0120   BC No Growth to date. Any change in status will be called. --    BLOODCULT2  --  No Growth to date. Any change in status will be called. No results for input(s): CXSURG in the last 72 hours. Radiology reports as per the Radiologist  Radiology: Us Renal Complete    Result Date: 2/16/2020  EXAMINATION: US RENAL COMPLETE 2/16/2020 11:00 AM HISTORY: Acute kidney injury COMPARISON: None FINDINGS: Transabdominal sonographic evaluation of the kidneys and urinary bladder was performed in the transverse and longitudinal projections. The right kidney appears normal in size and echogenicity, measuring 10.6 x 5.2 x 5.9 cm in size. There is no sign of collecting system dilatation or solid renal mass. The left kidney appears normal in size and echogenicity, measuring 12.9 x 5.8 x 4.5 cm in size. There is no sign of collecting system dilatation or solid renal mass. The urinary bladder is decompressed around a [place] catheter. There is trace free fluid in the pelvis. Negative renal ultrasound. Trace free fluid in the pelvis. Signed by Dr David Kirk on 2/16/2020 11:01 AM    Xr Chest 1 Vw    Result Date: 2/15/2020  XR CHEST 1 VIEW 2/15/2020 9:21 PM History: Short of breath. Portable chest x-ray compared with 7/17/2014. Right greater than left bilateral infiltrate compatible with diffuse pneumonia. Normal heart and mediastinum. No pneumothorax. 1. Right greater than left bilateral infiltrate compatible with diffuse pneumonia. 2. Follow-up to document resolution and rule out an underlying mass will be needed. Signed by Dr Kenya Engel on 2/15/2020 9:22 PM       Assessment   1.

## 2020-02-17 NOTE — PROCEDURES
no    Preparation: skin prepped with 2% chlorhexidine  Skin prep agent dried: skin prep agent completely dried prior to procedure  Sterile barriers: all five maximum sterile barriers used - cap, mask, sterile gown, sterile gloves, and large sterile sheet  Hand hygiene: hand hygiene performed prior to central venous catheter insertion  Location details: right femoral  Patient position: reverse Trendelenburg  Catheter type: double lumen  Catheter size: 9 Fr  Pre-procedure: landmarks identified  Ultrasound guidance: yes  Sterile ultrasound techniques: sterile gel and sterile probe covers were used  Number of attempts: 1  Successful placement: yes  Post-procedure: line sutured and dressing applied  Assessment: blood return through all ports  Patient tolerance: Patient tolerated the procedure well with no immediate complications          Fco Graham MD  2/17/2020

## 2020-02-17 NOTE — PROGRESS NOTES
Pharmacy Vancomycin Consult     Vancomycin Day: 2  Current Dosing: Pulse dosing    Temp max:  97.7    Recent Labs     02/16/20  0120 02/17/20  0150   BUN 68* 82*       Recent Labs     02/16/20  0120 02/17/20  0150   CREATININE 6.1* 6.6*       Recent Labs     02/16/20  0120 02/17/20  0150   WBC 5.0 5.8         Intake/Output Summary (Last 24 hours) at 2/17/2020 0419  Last data filed at 2/17/2020 0335  Gross per 24 hour   Intake 1300 ml   Output 200 ml   Net 1100 ml       Culture Date Source Results   02/15/20 Urine No growth                 Ht Readings from Last 1 Encounters:   02/15/20 5' 5\" (1.651 m)        Wt Readings from Last 1 Encounters:   02/16/20 170 lb 3.2 oz (77.2 kg)         Body mass index is 28.32 kg/m². Estimated Creatinine Clearance: 12 mL/min (A) (based on SCr of 6.6 mg/dL (H)).     Trough: 18.7    Assessment/Plan: give Vancomycin 1000mg x1 dose with random level 2/19 at 0200 (36 hours after last dose given)    Electronically signed by Jet Ojeda [facility] on 2/17/2020 at 4:19 AM

## 2020-02-17 NOTE — CONSULTS
times per day Dominick West MD   Stopped at 02/16/20 0858    sodium chloride flush 0.9 % injection 10 mL  10 mL Intravenous PRN Dominick West MD        [facility]) injection 4 mg  4 mg Intravenous Q6H PRN Dominick West MD        bisacodyl (DULCOLAX) suppository 10 mg  10 mg Rectal Daily PRN Dominick West MD        [facility]) injection 0.4 mg  0.4 mg Intravenous PRN Dominick West MD        acetaminophen (TYLENOL) tablet 650 mg  650 mg Oral Q4H PRN Dominick West MD        albuterol (PROVENTIL) nebulizer solution 2.5 mg  2.5 mg Nebulization Q1H PRN Dominick West MD        glucose (GLUTOSE) 40 % oral gel 15 g  15 g Oral PRN Dominick West MD        dextrose 50 % IV solution  12.5 g Intravenous PRN Dominick West MD        glucagon (rDNA) injection 1 mg  1 mg Intramuscular PRN Dominick West MD        dextrose 5 % solution  100 mL/hr Intravenous PRN oDminick West MD        sodium chloride flush 0.9 % injection 10 mL  10 mL Intravenous 2 times per day Moisés De Jesus MD   Stopped at 02/16/20 0858    sodium chloride flush 0.9 % injection 10 mL  10 mL Intravenous PRN MD Umu Mckenzie [facility]) injection 4 mg  4 mg Intravenous Q6H PRN Moisés De Jesus MD        sodium bicarbonate 50 mEq in sodium chloride 0.45 % 1,000 mL infusion   Intravenous Continuous Dominick West MD 50 mL/hr at 02/17/20 0031      potassium chloride (KLOR-CON M) extended release tablet 40 mEq  40 mEq Oral PRN Moisés De Jesus MD        Or   [facility] potassium bicarb-citric acid (EFFER-K) effervescent tablet 40 mEq  40 mEq Oral PRN Moisés De Jesus MD        Or   [facility] potassium chloride 10 mEq/100 mL IVPB (Peripheral Line)  10 mEq Intravenous PRN Moisés De Jesus MD        potassium chloride 10 mEq/100 mL IVPB (Peripheral Line)  10 mEq Intravenous PRN Moisés De Jesus MD        magnesium sulfate 1 g in dextrose 5% 100 mL IVPB  1 g Intravenous PRN Moisés De Jesus MD        senna (SENOKOT) tablet 8.6 mg  1 tablet Oral cough, or chest tightness associated with shortness of breath. Cardiovascular - no chest pain, syncope, or significant dizziness. No palpitations or significant leg swelling. No claudication. Gastrointestinal - no abdominal swelling or pain. No blood in stool. No severe constipation, diarrhea, nausea, or vomiting. Genitourinary - No dysuria, frequency, or urgency. No flank pain or hematuria. Musculoskeletal - no back pain, gait disturbance, or myalgia. Skin - no color change, rash, pallor, or new wound. Neurologic - no dizziness, facial asymmetry, or light headedness. No seizures. No speech difficulty or lateralizing weakness. Hematologic - no easy bruising or excessive bleeding. Psychiatric - no severe anxiety or nervousness. No confusion. All other review of systems are negative. Physical Exam    BP (!) 140/72   Pulse 101   Temp 97 °F (36.1 °C) (Temporal)   Resp 24   Ht 5' 5\" (1.651 m)   Wt 173 lb 9.6 oz (78.7 kg)   SpO2 (!) 86%   BMI 28.89 kg/m²     Constitutional - well developed, well nourished. No diaphoresis or acute distress. HENT - head normocephalic. Right external ear canal appears normal.  Left external ear canal appears normal.  Septum appears midline. Eyes - conjunctiva normal.  EOMS normal.  No exudate. No icterus. Neck- ROM appears normal, no tracheal deviation. Cardiovascular - Regular rate and rhythm. Heart sounds are normal.  No murmur, rub, or gallop. Carotid pulses are 2+ to palpation bilaterally without bruit. Extremities - Radial and brachial pulses are 2+ to palpation bilaterally. No cyanosis, clubbing. + edema. No signs atheroembolic event. Pulmonary -On pressure support mask, No respiratory distress. Lungs - Breath sounds normal. No wheezes or rales. GI - Abdomen - soft, non tender, bowel sounds X 4 quadrants. No guarding or rebound tenderness. No distension or palpable mass. Genitourinary - deferred.   Musculoskeletal - ROM appears normal.  No significant edema. Neurologic - alert and oriented X 3. Physiologic. Skin - warm, dry, and intact. No rash, erythema, or pallor. Psychiatric - mood, affect, and behavior appear normal.  Judgment and thought processes appear normal.    Options have been discussed with the patient including continued medical management and permacath. Patient has opted to proceed with permacath. Risks have been discussed with the patient including MI, death, stroke, nerve injury, infection, bleed and steal phenomenon. Assessment  Acute on chronic renal failure, needs urgent dialysis      Plan    Proceed with temporary femoral catheter placement, will eventually need permacath  Placement when more stable.

## 2020-02-17 NOTE — PLAN OF CARE
Problem: Falls - Risk of:  Goal: Will remain free from falls  Description  Will remain free from falls  Outcome: Ongoing  Goal: Absence of physical injury  Description  Absence of physical injury  Outcome: Ongoing     Problem: Risk for Impaired Skin Integrity  Goal: Tissue integrity - skin and mucous membranes  Description  Structural intactness and normal physiological function of skin and  mucous membranes.   Outcome: Ongoing     Problem: Respiratory Function - Compromised  Goal: Ability to maintain adequate oxygenation will improve  Description  Ability to maintain adequate oxygenation will improve     Outcome: Ongoing  Goal: Oxygen saturation during activity within specified parameters  Outcome: Ongoing     Problem: FLUID AND ELECTROLYTE IMBALANCE  Goal: Fluid and electrolyte balance are achieved/maintained  Outcome: Ongoing

## 2020-02-17 NOTE — PROGRESS NOTES
Subjective:   Critical Care Daily Progress Note: 2/17/2020 3:19 PM    Interval History:   [id number]:  He was only able to tolerate 1.3L of anticipated 3L ultrafiltration. He remains on BiPAP. Respiratory status worsening, pulmonary adjusted BiPAP. If Respiratory failure will have to intubate. [id number]:  Mr. Juan José Kulkarni is a mentally handicapped 61year old  [facility] man and fetal alcohol syndrome victim. He had been transferred to us for worsening oxygenation necessitating BiPAP for NIPPV suppport as well as alteration of mental status in addition to the originally stated reason of needing nephrology evaluation. Upon searching for further information my colleague discovered that the hopital where the patient had been has an ICU in addition to intensivist support on site. When he was calling them for further information, he was also unable to obtain greater detail as to ABx therapy. He had added a CT scan to my initial plan as per the AMS they revealed to him later in the day when he called, this did not reveal a cause of any alteration to us. He has as per his RN team improved as to his alertness, but we are unaware of what his baseline cognitive function is. He reportedly has the faculties of a 15year old. [id number]: (copied from Dr. Kenisha Rodrigues admission note)  \"The patient is a 56 y. o. male with PMH of ETOH fetal syndrome, DM,afib, on eliquis  apparently presented from [facility] [place] about 5 days ago with sepsis weakness ?  Pneumonia, was given Cr was 1.97 , started declining today transferred CR was up to 5.6 was given IVF , worsening of respiratory failure , urine output declined was given bumex, without any response, today was very lethargic, he was on and off bipap, ABG from yesterday PH 7.25 ,PO2 64  PCO33 , bicarb 14.5 on bipap %36  16/10  family does not talk to him , and he is a [facility] of the [facility] , there was no repeat ABG done after the bipap since yesterday and no head CT done for MS change , it was Ref Range: 35.0 - 45.0 mmHg 42.0   pO2, Arterial Latest Ref Range: 80.0 - 100.0 mmHg 64.0 (L)   HCO3, Arterial Latest Ref Range: 22.0 - 26.0 mmol/L 19.7 (L)   Base Excess, Arterial Latest Ref Range: -2.0 - 2.0 mmol/L -6.6 (L)   O2 Sat, Arterial Latest Ref Range: >92 % 91.9   O2 Content, Arterial Latest Ref Range: Not Established mL/dL 10.8   Methemoglobin, Arterial Latest Ref Range: <1.5 % 1.6   Carboxyhgb, Arterial Latest Ref Range: 0.0 - 5.0 % 2.4         Assessment:     Active Problems:    Acute hypoxemic respiratory failure (HCC)    Acute renal failure with tubular necrosis (HCC)    Fetal alcohol syndrome    Stage 3 chronic kidney disease (HCC)  Resolved Problems:    * No resolved hospital problems.  *        Plan:     Acute Hypoxemic Respiratory Failure due to Multifocal PNA requiring NIPPV support:  BiPAP QHS and PRN  Continue Merrem and Vanco  Tylenol PRN  Albuterol PRN  Duonebs  IS Q2h WA     JANI:  Nephro consult, comanagement appreciated     AMS:  CT Head (negative)  Suspect this is most likely a metabolic encephelopathy     DVT PPx: Apixaban as per Hx A Fib        Hx A Fib:  Continue Apixaban 5mg PO BID chronically  Continue Amiodarone 200mg PO QDay     Hx Hypothyroidism:  Continue Levothyroxine 25mcg PO QAC     Hx Fe Deficiency & Hypovitaminosis:  Continue Ferrous Sulfate 325mg PO QAC  Continue Foltx Multi Vit 1 tab PO QDay     Mood stabalization?:  Continue Abilify 10mg PO QDay     Bowel Health:  Continue probiotic 1 tab PO QDay     HLD:  Subbed cholestyramine for welchol        Critical Care time of >45 minutes      ADDENDUM:    HOLD Eliquis as not able to get him to take PO at this time  Place on Heparin GGT for now

## 2020-02-18 PROBLEM — Z51.5 PALLIATIVE CARE PATIENT: Status: ACTIVE | Noted: 2020-01-01

## 2020-02-18 PROBLEM — I48.0 PAROXYSMAL ATRIAL FIBRILLATION (HCC): Chronic | Status: ACTIVE | Noted: 2020-01-01

## 2020-02-18 NOTE — PROGRESS NOTES
Subjective:   Critical Care Daily Progress Note: 2/18/2020 8:30 AM    Interval History:   [id number]:  Mr. Juan José Kulkarni is resting on BiPAP. He continues to have AMS. He is on diltiazem GGT still. He was seen and assessed with pulmonary medicine at the bedside and we concur with plan as to when / if to intubate. The case was d/w them again this AM. He is for HD again today with further UF. [id number]:  He was only able to tolerate 1.3L of anticipated 3L ultrafiltration. He remains on BiPAP. Respiratory status worsening, pulmonary adjusted BiPAP. If Respiratory failure will have to intubate. [id number]:  Mr. Juan José Kulakrni is a mentally handicapped 61year old  [facility] man and fetal alcohol syndrome victim. He had been transferred to us for worsening oxygenation necessitating BiPAP for NIPPV suppport as well as alteration of mental status in addition to the originally stated reason of needing nephrology evaluation. Upon searching for further information my colleague discovered that the hopital where the patient had been has an ICU in addition to intensivist support on site. When he was calling them for further information, he was also unable to obtain greater detail as to ABx therapy. He had added a CT scan to my initial plan as per the AMS they revealed to him later in the day when he called, this did not reveal a cause of any alteration to us. He has as per his RN team improved as to his alertness, but we are unaware of what his baseline cognitive function is. He reportedly has the faculties of a 15year old. [id number]: (copied from Dr. Kenisha Rodrigues admission note)  \"The patient is a 56 y. o. male with PMH of ETOH fetal syndrome, DM,afib, on eliquis  apparently presented from [facility] about 5 days ago with sepsis weakness ?  Pneumonia, was given Cr was 1.97 , started declining today transferred CR was up to 5.6 was given IVF , worsening of respiratory failure , urine output declined was given bumex, without any response, today was very lethargic, he was on and off bipap, ABG from yesterday PH 7.25 ,PO2 64  PCO33 , bicarb 14.5 on bipap %36  16/10  family does not talk to him , and [place] , there was no repeat ABG done after the bipap since yesterday and no head CT done for MS change , it was reported that patient was arousable but lethargic\"  [id number]: (signout from transfer center and attending physician at OSH received by me)  \"As per transfer center \"pneumoniae\" \"needs ICU\" but unable to specify further  As per transfer center age 61, as per referring doc age 76  As per referring facility who was in clinic, they are in the CCU \"because they are really sick\"  They had bee admitted for Sepsis due to Pneumoniae \"four or 5 days ago\" (cound not specify further about type suspected or location in lung or antibiotics)  History of Fetal Alcohol Syndrome  Renal function (creatanine) was \"about 1.9\" was 4 yesterday, now about 5.2  Pressure 150/(80-90)  On BiPAP at OSH, NOT on home O2  Blood Cxx all negative so far\"    Scheduled Meds:   ipratropium-albuterol  1 ampule Inhalation 4x daily    meropenem  500 mg Intravenous Q12H    amiodarone  200 mg Oral Daily    ARIPiprazole  10 mg Oral Daily    cholestyramine light  4 g Oral Daily    ferrous sulfate  325 mg Oral Daily with breakfast    fluticasone  1 spray Each Nostril BID    folic acid-pyridoxine-cyancobalamin  1 tablet Oral Daily    levothyroxine  25 mcg Oral Daily    lactobacillus  1 capsule Oral Daily    sodium chloride flush  10 mL Intravenous 2 times per day    sodium chloride flush  10 mL Intravenous 2 times per day    insulin lispro  0-12 Units Subcutaneous Q4H    insulin lispro  0-6 Units Subcutaneous Nightly    vancomycin (VANCOCIN) intermittent dosing (placeholder)   Other RX Placeholder     Continuous Infusions:   diltiazem (CARDIZEM) 125 mg in dextrose 5% 125 mL infusion 7.5 mg/hr (02/18/20 0611)    dextrose       PRN Meds:sodium chloride flush, ondansetron, bisacodyl, naloxone, acetaminophen, albuterol, glucose, dextrose, glucagon (rDNA), dextrose, sodium chloride flush, ondansetron, potassium chloride **OR** potassium alternative oral replacement **OR** potassium chloride, potassium chloride, magnesium sulfate, senna, acetaminophen    Review of Systems  Pertinent items are noted in HPI. Objective:     I/O last 3 completed shifts: In: 1663.4 [I.V.:1113.4; IV Piggyback:550]  Out: 1106 [Urine:124; Other:1150]  No intake/output data recorded. BP (!) 125/53   Pulse 86   Temp 98.1 °F (36.7 °C) (Temporal)   Resp 26   Ht 5' 5\" (1.651 m)   Wt 173 lb 6.4 oz (78.7 kg)   SpO2 90%   BMI 28.86 kg/m²     Physical Exam  Vitals signs reviewed. Constitutional:       General: He is not in acute distress. Appearance: He is obese. He is ill-appearing. He is not toxic-appearing. HENT:      Head: Normocephalic and atraumatic. Nose: No congestion or rhinorrhea. Eyes:      General:         Right eye: No discharge. Left eye: No discharge. Neck:      Musculoskeletal: Neck supple. Comments: Trachea appears midline  Cardiovascular:      Rate and Rhythm: Normal rate and regular rhythm. Heart sounds: No murmur. No friction rub. No gallop. Pulmonary:      Effort: No respiratory distress. Breath sounds: No stridor. No wheezing, rhonchi or rales. Abdominal:      General: Bowel sounds are normal.      Tenderness: There is no abdominal tenderness. There is no guarding or rebound. Skin:     General: Skin is warm. Comments: nondiaphoretic   Neurological:      Comments: Lethargic, no tremors   Psychiatric:      Comments: Not able to assess at this time as per general medical condition       LABS:  Results for Mario Ordaz (MRN [id number]) as of 2/18/2020 08:32   Ref.  Range 2/18/2020 02:22 2/18/2020 02:22 2/18/2020 04:23   Sodium Latest Ref Range: 136 - 145 mmol/L 140     Potassium Latest Ref Range: 3.5 - 5.0 mmol/L 4.3 4.3    Chloride Latest Ref Range: 98 - 111 mmol/L 99     CO2 Latest Ref Range: 22 - 29 mmol/L 22     BUN Latest Ref Range: 6 - 20 mg/dL 51 (H)     Creatinine Latest Ref Range: 0.5 - 1.2 mg/dL 4.6 (H)     Anion Gap Latest Ref Range: 7 - 19 mmol/L 19     GFR Non- Latest Ref Range: >60  13 (A)     Glucose Latest Ref Range: 74 - 109 mg/dL 149 (H)     Calcium Latest Ref Range: 8.6 - 10.0 mg/dL 7.3 (L)     Total Protein Latest Ref Range: 6.6 - 8.7 g/dL 5.7 (L)     Potassium, Whole Blood Unknown   3.9   Albumin Latest Ref Range: 3.5 - 5.2 g/dL 2.5 (L)     Alk Phos Latest Ref Range: 40 - 130 U/L 70     ALT Latest Ref Range: 5 - 41 U/L 23     AST Latest Ref Range: 5 - 40 U/L 25     Bilirubin Latest Ref Range: 0.2 - 1.2 mg/dL 0.4     WBC Latest Ref Range: 4.8 - 10.8 K/uL 7.9     RBC Latest Ref Range: 4.70 - 6.10 M/uL 3.50 (L)     Hemoglobin Quant Latest Ref Range: 14.0 - 18.0 g/dL 9.1 (L)     Hematocrit Latest Ref Range: 42.0 - 52.0 % 28.7 (L)     MCV Latest Ref Range: 80.0 - 94.0 fL 82.0     MCH Latest Ref Range: 27.0 - 31.0 pg 26.0 (L)     MCHC Latest Ref Range: 33.0 - 37.0 g/dL 31.7 (L)     MPV Latest Ref Range: 9.4 - 12.4 fL 10.6     RDW Latest Ref Range: 11.5 - 14.5 % 17.0 (H)     Platelet Count Latest Ref Range: 130 - 400 K/uL 214     Neutrophils % Latest Ref Range: 50.0 - 65.0 % 91.0 (H)     Lymphocyte % Latest Ref Range: 20.0 - 40.0 % 6.0 (L)     Monocytes % Latest Ref Range: 0.0 - 10.0 % 1.0     Eosinophils % Latest Ref Range: 0.0 - 5.0 % 0.0     Basophils % Latest Ref Range: 0.0 - 1.0 % 0.0     Neutrophils Absolute Latest Ref Range: 1.5 - 7.5 K/uL 7.3     Immature Granulocytes # Latest Units: K/uL 0.6     Lymphocytes Absolute Latest Ref Range: 1.1 - 4.5 K/uL 0.5 (L)     Monocytes Absolute Latest Ref Range: 0.00 - 0.90 K/uL 0.10     Eosinophils Absolute Latest Ref Range: 0.00 - 0.60 K/uL 0.00     Basophils Absolute Latest Ref Range: 0.00 - 0.20 K/uL 0.00     Bands Relative Latest Ref Range: 0 - 5 % 2     Polychromasia Unknown 1+ (A)     Anisocytosis Unknown 1+ (A)     Hypochromia Unknown 1+ (A)     PLATELET SLIDE REVIEW Unknown Adequate     Prothrombin Time Latest Ref Range: 12.0 - 14.6 sec 69.2 (HH)     INR Latest Ref Range: 0.88 - 1.18  8.40 (HH)     O2 Therapy Unknown   Unknown   Hemoglobin, Art, Extended Latest Ref Range: 14.0 - 18.0 g/dL   9.1 (L)   pH, Arterial Latest Ref Range: 7.350 - 7.450    7.370   pCO2, Arterial Latest Ref Range: 35.0 - 45.0 mmHg   43.0   pO2, Arterial Latest Ref Range: 80.0 - 100.0 mmHg   53.0 (L)   HCO3, Arterial Latest Ref Range: 22.0 - 26.0 mmol/L   24.9   Base Excess, Arterial Latest Ref Range: -2.0 - 2.0 mmol/L   -0.5   O2 Sat, Arterial Latest Ref Range: >92 %   88.4 (L)   O2 Content, Arterial Latest Ref Range: Not Established mL/dL   11.3   Methemoglobin, Arterial Latest Ref Range: <1.5 %   1.7   Carboxyhgb, Arterial Latest Ref Range: 0.0 - 5.0 %   2.4         Assessment:     Active Problems:    Acute hypoxemic respiratory failure (HCC)    Acute renal failure with tubular necrosis (HCC)    Fetal alcohol syndrome    Stage 3 chronic kidney disease (HCC)    Palliative care patient  Resolved Problems:    * No resolved hospital problems.  *        Plan:     Acute Hypoxemic Respiratory Failure due to Multifocal PNA requiring NIPPV support:  BiPAP QHS and PRN  Continue Merrem and Vanco  Tylenol PRN  Albuterol PRN  Duonebs  IS Q2h WA     JANI:  Nephro consult, comanagement appreciated     AMS:  CT Head (negative)  Suspect this is most likely a metabolic encephelopathy     DVT PPx: Apixaban as per Hx A Fib        Hx A Fib:  Continue Apixaban 5mg PO BID chronically  Continue Amiodarone 200mg PO QDay     Hx Hypothyroidism:  Continue Levothyroxine 25mcg PO QAC     Hx Fe Deficiency & Hypovitaminosis:  Continue Ferrous Sulfate 325mg PO QAC  Continue Foltx Multi Vit 1 tab PO QDay     Mood stabalization?:  Continue Abilify 10mg PO QDay     Bowel Health:  Continue probiotic 1 tab PO QDay     HLD:  Subbed cholestyramine for welchol       HOLD Eliquis as not able to get him to take PO at this time  Place on Heparin GGT for now     Critical Care time of >45 minutes

## 2020-02-18 NOTE — PROGRESS NOTES
Disabled   Social Needs    Financial resource strain: Not on file    Food insecurity:     Worry: Not on file     Inability: Not on file    Transportation needs:     Medical: Not on file     Non-medical: Not on file   Tobacco Use    Smoking status: Unknown If Ever Smoked    Tobacco comment: Pt with decreased LOC   Substance and Sexual Activity    Alcohol use: Not on file     Comment: ALANNAH    Drug use: Not on file     Comment: ALANNAH    Sexual activity: Not on file   Lifestyle    Physical activity:     Days per week: Not on file     Minutes per session: Not on file    Stress: Not on file   Relationships    Social connections:     Talks on phone: Not on file     Gets together: Not on file     Attends Baptism service: Not on file     Active member of club or organization: Not on file     Attends meetings of clubs or organizations: Not on file     Relationship status: Not on file    Intimate partner violence:     Fear of current or ex partner: Not on file     Emotionally abused: Not on file     Physically abused: Not on file     Forced sexual activity: Not on file   Other Topics Concern    Not on file   Social History Narrative    Not on file       Review of Systems:  History obtained from unobtainable from patient due to mental status          Objective:  Patient Vitals for the past 24 hrs:   BP Temp Temp src Pulse Resp SpO2 Weight   02/18/20 1035 -- -- -- -- 28 91 % --   02/18/20 0900 (!) 129/55 -- -- 86 24 92 % --   02/18/20 0820 -- -- -- 90 30 (!) 88 % --   02/18/20 0800 (!) 130/57 96.8 °F (36 °C) Temporal 88 (!) 40 91 % --   02/18/20 0700 (!) 125/53 -- -- 86 26 90 % --   02/18/20 0615 -- -- -- -- 26 (!) 89 % --   02/18/20 0614 -- -- -- 84 27 90 % --   02/18/20 0612 -- -- -- 84 26 (!) 89 % --   02/18/20 0600 126/60 -- -- 82 23 93 % --   02/18/20 0500 (!) 133/58 -- -- 82 24 93 % --   02/18/20 0400 (!) 128/59 98.1 °F (36.7 °C) Temporal 87 26 93 % --   02/18/20 0300 (!) 130/57 -- -- 86 25 94 % 173 lb 6.4 oz bedside       Thank you for the consult, we appreciate the opportunity to provide care to your patients. Feel free to contact me if I can be of any further assistance.       Kylah Tafoya MD  02/18/20  10:44 AM

## 2020-02-18 NOTE — PROGRESS NOTES
Spoke with SW this morning to clarify needs for pt and decision making. Palliative care nurse called and spoke with pt brother, Frances Mckeon, and talked with him about goals of care for his brother. Also asking pt brother if he would want SW, myself, he and his father to have conf call about pt. As we talked I also asked him if his father decided he didn't/couldn't make decisions and asked for him(Nestor) to be decision maker, would he be willing to take the responsibility. We talked about pt resp difficulty, intolerance of HD, possible intubation, etc. With all of this tx pt does not seem to be improving. We also talked about code status and what resuscitation would be. Brother tells me \"I understand the difficulty of this and I do not think Deloris Gordon would have wanted all that\"(talking about possible extubation vs trach and FT). He goes on to tell me that to his knowledge this is the 3rd time his brother would be intubated. Brother also reviews with me the difficulty and family dynamics with this brother. 14:31  Just rec'd call from pt father, Chad Moura, he talked with me about his son's code status. He states \"I think that is the way to go, I'm not sure he would want all this\". I did ask him to call pt nurse and inform her so she can call MD and have order changed. I also discussed with him goal of hospice care to keep pt comfortable. PC nurse discussed with him [address] process as well as if pt should survive then placement with hospice to follow. He verbalized understanding. For now he wants pt to be DNR. Palliative following for continued support/goals.   Electronically signed by Kaylan Fallon RN on 2/18/2020 at 2:47 PM

## 2020-02-18 NOTE — PLAN OF CARE
Problem: Falls - Risk of:  Goal: Will remain free from falls  Description  Will remain free from falls  Outcome: Ongoing  Goal: Absence of physical injury  Description  Absence of physical injury  Outcome: Ongoing     Problem: Risk for Impaired Skin Integrity  Goal: Tissue integrity - skin and mucous membranes  Description  Structural intactness and normal physiological function of skin and  mucous membranes.   Outcome: Ongoing     Problem: Respiratory Function - Compromised  Goal: Ability to maintain adequate oxygenation will improve  Description  Ability to maintain adequate oxygenation will improve     Outcome: Ongoing  Goal: Oxygen saturation during activity within specified parameters  Outcome: Ongoing     Problem: FLUID AND ELECTROLYTE IMBALANCE  Goal: Fluid and electrolyte balance are achieved/maintained  Outcome: Ongoing     Problem: Urinary Elimination:  Goal: Signs and symptoms of infection will decrease  Description  Signs and symptoms of infection will decrease  Outcome: Ongoing  Goal: Complications related to the disease process, condition or treatment will be avoided or minimized  Description  Complications related to the disease process, condition or treatment will be avoided or minimized  Outcome: Ongoing

## 2020-02-18 NOTE — PROGRESS NOTES
Spoke with patient's father about code status. He stated he wanted the patient to be a DNR. I explained that would include no compressions, no shocking, no emergency medications, and no intubation. He verbalized agreement with me. And also expressed his wishes for the patient to be sent to hospice care. I explained what that would include and he agreed that he wished for the patient to be made comfortable and to stop the hemodialysis treatments. I spoke with Dr. Gale Woodson who placed orders and spoke with Bushra Kendall from palliative as well.  Ralph the hospice chaplain will be contacting father shortly for information

## 2020-02-18 NOTE — CARE COORDINATION
[address] Transitions Interview     2020    Patient: Flower Scruggs Patient : 1960   MRN: [id number]  Reason for Admission:   RARS: Readmission Risk Score: 32         Spoke with: patient lethargic      Readmission Risk  Patient Active Problem List   Diagnosis    [facility] hypoxemic [facility] failure [facility] [facility]    Acute renal failure with tubular necrosis ([facility] [facility]    Fetal alcohol syndrome    Stage 3 chronic kidney disease ([facility] [address]    Palliative care patient    Acquired hypothyroidism    Essential hypertension    Gastroesophageal reflux disease without esophagitis    Paroxysmal atrial fibrillation [facility])       Inpatient Assessment  Care Transitions Summary    Care Transitions Inpatient Review  Medication Review  Housing Review  Social Support  Durable Medical Equipment  Functional Review  Hearing and Vision  Care Transitions Interventions                                 Follow Up : Met at bedside with patient; however his is lethargic at this time. Patient is resident from [place] [id number]. He has hx of Fetal Alcohol Syndrome. Unable to assess his status or interview questions at this time. Will follow as inpatient and upon discharge, as indicated. No future appointments. Health Maintenance  There are no preventive care reminders to display for this patient.     Bisi Abbott RN

## 2020-02-18 NOTE — CONSULTS
PC consulted to assist with goals. Apparently, pt lives at [facility]. It is my understanding pt lived with his grandmother until approx 3 yrs ago when she passed away. Pt is mentally handicapped/fetal alcohol syndrome per notes. Family, father and brother have been contacted about pt being in the hospital and need for decision maker. The family thought pt had a guardian appointed. SW involved and notes that pt does not have appointed guardian(see her note)  Spoke with pt nurse this a.m. Nurse reports that she has spoke with pt father for decision making and has explained situation about pt condition to him. Nurse reports  \"pt father just keeps stating, well I just don't know\". Pt father did give permission for line to be placed so HD could be initiated. Currently, pt is on bi pap 20/12 with 35 l/min. HD was attempted shortly after admission to ICU,  pt did not vasquez, HR elevated, breathing became \"worse\" HD was stopped. Will attempt to try HD again. PC reached out to KADEN to obtain information. SW has talked with pt brother, he gave SW father's name/number for contact. Pt brother lives in [place] and father lives in [place]. PC will follow for support.   Electronically signed by Yumiko Chin RN on 2/18/2020 at 11:35 AM

## 2020-02-18 NOTE — PROGRESS NOTES
Cardiology Progress Note Madalyn Worrell MD      Patient:  Dani Goldberg  [id number]    Patient Active Problem List    Diagnosis Date Noted    Palliative care patient 02/18/2020     Priority: Low    Paroxysmal atrial fibrillation ([address] 02/18/2020     Priority: Low    Acquired hypothyroidism      Priority: Low    Essential hypertension      Priority: Low    Gastroesophageal reflux disease without esophagitis      Priority: Low    Acute renal failure with tubular necrosis ([address] 02/17/2020     Priority: Low    Fetal alcohol syndrome 02/17/2020     Priority: Low    Stage 3 chronic kidney disease ([address] 02/17/2020     Priority: Low    Acute hypoxemic respiratory failure ([address] 02/15/2020     Priority: Low       Admit Date:  2/15/2020    Admission Problem List: Present on Admission:   Acute hypoxemic respiratory failure (HCC)   Acute renal failure with tubular necrosis (HCC)   Fetal alcohol syndrome   Stage 3 chronic kidney disease ([address]   Palliative care patient   Acquired hypothyroidism   Essential hypertension   Gastroesophageal reflux disease without esophagitis   Paroxysmal atrial fibrillation [facility])      Cardiac Specific Data:  Specialty Problems        Cardiology Problems    Essential hypertension        Paroxysmal atrial fibrillation ([address]               1. Paroxysmal atrial fibrillation, on amiodarone and Eliquis. 2. Fetal alcohol syndrome with mental retardation ([place] 3. Diabetes mellitus. 4. Acute on chronic renal failure requiring hemodialysis.     Subjective:  Mr. Sarah Mccabe remains nonverbal, on BiPAP. INR noted to rise to 8.4. Remains in sinus rhythm.      Objective:   BP (!) 129/55   Pulse 86   Temp 96.8 °F (36 °C) (Temporal)   Resp 28   Ht 5' 5\" (1.651 m)   Wt 173 lb 6.4 oz (78.7 kg)   SpO2 (!) 88%   BMI 28.86 kg/m²       Intake/Output Summary (Last 24 hours) at 2/18/2020 1347  Last data filed at 2/18/2020 0800  Gross per 24 hour   Intake 741.39 ml   Output 1254 ml   Net -512.61 ml disproportionate to the degree of cerebral volume loss. Basilar cisterns are patent. Posterior fossa appears grossly normal. Calvarium is intact. Visualized paranasal sinuses and mastoid air cells appear clear. Impression: No acute intracranial findings. Signed by Dr Tru Lemus on 2/16/2020 2:27 PM    Us Renal Complete    Result Date: 2/16/2020  EXAMINATION: US RENAL COMPLETE 2/16/2020 11:00 AM HISTORY: Acute kidney injury COMPARISON: None FINDINGS: Transabdominal sonographic evaluation of the kidneys and urinary bladder was performed in the transverse and longitudinal projections. The right kidney appears normal in size and echogenicity, measuring 10.6 x 5.2 x 5.9 cm in size. There is no sign of collecting system dilatation or solid renal mass. The left kidney appears normal in size and echogenicity, measuring 12.9 x 5.8 x 4.5 cm in size. There is no sign of collecting system dilatation or solid renal mass. The urinary bladder is decompressed around a Everett catheter. There is trace free fluid in the pelvis. Negative renal ultrasound. Trace free fluid in the pelvis. Signed by Dr Tru Lemus on 2/16/2020 11:01 AM    Xr Chest Portable    Result Date: 2/18/2020  EXAMINATION: XR CHEST PORTABLE 2/18/2020 6:54 AM HISTORY: XR CHEST PORTABLE 2/18/2020 5:00 AM HISTORY: Respiratory failure COMPARISON: February 17, 2020. FINDINGS: Extensive bilateral airspace filling infiltrates are present. This is not significantly changed compared to February 17. The etiology is not apparent. Differential considerations include an underlying inflammatory or possibly neoplastic process. This may be superimposed on pulmonary vascular congestion. .. Cardiac silhouettes unchanged. . The osseous structures and surrounding soft tissues demonstrate no acute abnormality. 1. Extensive bilateral infiltrates are noted. The etiology is not apparent. Follow-up is suggested to ensure that this resolves. There is no significant improvement. Signed by Dr Ute Manuel on 2/18/2020 6:56 AM    Xr Chest Portable    Result Date: 2/17/2020  EXAMINATION: XR CHEST PORTABLE 2/17/2020 9:29 AM HISTORY: XR CHEST PORTABLE 2/17/2020 7:30 AM HISTORY: Bilateral infiltrates COMPARISON: February 15, 2020. FINDINGS: Extensive bilateral airspace filling infiltrates are noted greater on the right than the left with no improvement from February 15. . Cardiac silhouette is normal.. The osseous structures and surrounding soft tissues demonstrate no acute abnormality. 1. Extensive bilateral infiltrates are present. Cannot be determined if this an inflammatory or neoplastic process. There is no improvement from February 15. CT scan with contrast may be considered. Signed by Dr Ute Manuel on 2/17/2020 9:36 AM    Xr Chest 1 Vw    Result Date: 2/15/2020  XR CHEST 1 VIEW 2/15/2020 9:21 PM History: Short of breath. Portable chest x-ray compared with 7/17/2014. Right greater than left bilateral infiltrate compatible with diffuse pneumonia. Normal heart and mediastinum. No pneumothorax. 1. Right greater than left bilateral infiltrate compatible with diffuse pneumonia. 2. Follow-up to document resolution and rule out an underlying mass will be needed. Signed by Dr Mohini Dunlap on 2/15/2020 9:22 PM        Assessment and Plan: This is a 61y.o. year old male with past medical history of fetal alcohol syndrome with mental retardation, diabetes mellitus, paroxysmal atrial fibrillation on Eliquis and amiodarone presenting with respiratory distress requiring BiPAP, possible pneumonia and acute on chronic renal failure, initiated on hemodialysis with episode of atrial fibrillation with rapid ventricular response, now in sinus rhythm, noted elevated INR. 1. INR continue to rise at 8.4. Would give vitamin K 5 mg now. Likely recurrence of atrial fibrillation during dialysis. Not taking orally currently.         Roc Faye MD 2/18/2020 1:47 PM

## 2020-02-18 NOTE — PROGRESS NOTES
Vascular Surgery Rounding Note                                                     Dr.Timothy Hazel    2/18/2020  9:08 AM      Post op:  1. On 02/17/2020, RFV Temporary Hemodialysis Access by Dr. Annabelle Alcantara    Subjective: Non-responsive. Objective: Invalid input(s): 24H    Intake/Output Summary (Last 24 hours) at 2/18/2020 0908  Last data filed at 2/18/2020 0800  Gross per 24 hour   Intake 798.89 ml   Output 1274 ml   Net -475.11 ml     In: 15.5 [I.V.:15.5]  Out: 25 [Urine:25]    Physical Exam:  Awake, alert, appropriate No, Patient is non-responsive on BiPAP with 35 lpm O2  BP (!) 130/57   Pulse 90   Temp 96.8 °F (36 °C) (Temporal)   Resp 30   Ht 5' 5\" (1.651 m)   Wt 173 lb 6.4 oz (78.7 kg)   SpO2 (!) 88%   BMI 28.86 kg/m²   Heart - Distant HT with RRR. No murmurs, gallops or rubs. Lung - Scattered rhonchi without wheezes. Neck - Supple. No JVD. Trachea midline. Abdomen - Soft, non-tender with active bowel sounds. Extremities - No clubbing or cyanosis. Mild generalized edema. Pedal pulses palpated. Neurologic - Nonresponsive  Wound surgical incisions - Right temporary dialysis line, RFV. Dressing intact with old bloody drainage under dressing. Assessment:    1. Acute Hypoxic Respiratory Failure - Currently on BiPAP with 35 lpm O2  2. Acute Renal Failure with ATN with Underlying CKD, Stage 3 - Hemodialysis  3. Essential HTN  4. Paroxysmal Atrial Fibrillation - Currently in SR on Cardizem Drip. No anticoagulant at this time. 5. Hx Fetal Alcohol Syndrome  6. Acquired Hypothyroidism  7. GERD      Plan:    1. Per admitting and consultants. 2. Vascular Surgery will sign off. Call if patient needs permcath or other vascular needs.      DVT prophylaxis: SCDs        OFELIA James-BC

## 2020-02-18 NOTE — PROGRESS NOTES
> 66* 68*  --  82*  --   --  51*  --    CREATININE  --    < > 5.7* 6.1*  --  6.6*  --   --  4.6*  --    CALCIUM  --    < > 7.7* 7.9*  --  7.7*  --   --  7.3*  --    GLUCOSE  --    < > 408* 376*  --  160*  --   --  149*  --    PHART 7.310*  --   --   --   --   --  7.280*  --   --  7.370   VCY6EOL 41.0  --   --   --   --   --  42.0  --   --  43.0   PO2ART 75.0*  --   --   --   --   --  64.0*  --   --  53.0*   QZJ5NAH 20.6*  --   --   --   --   --  19.7*  --   --  24.9   V2XEYGCN 95.0  --   --   --   --   --  91.9  --   --  88.4*   BEART -5.3*  --   --   --   --   --  -6.6*  --   --  -0.5   AST  --    < > 40  --   --   --   --   --  25  --    ALT  --    < > 38  --   --   --   --   --  23  --    ALKPHOS  --    < > 63  --   --   --   --   --  70  --    BILITOT  --    < > 0.3  --   --   --   --   --  0.4  --    MG  --    < > 2.6  --   --  2.4  --   --   --   --    PHOS  --    < > 4.9*  --   --  4.7*  --   --   --   --    TROPONINI  --   --  0.02  --   --   --   --   --   --   --    LACTA  --   --  1.1  --   --   --   --   --   --   --    INR  --   --  6.62*  --    < >  --   --    < > 8.40*  --     < > = values in this interval not displayed. Recent Labs     02/15/20  1877   BC No Growth to date. Any change in status will be called. Radiograph:   02/18/20 1189    Narrative:     EXAMINATION: XR CHEST PORTABLE 2/18/2020 6:54 AM  HISTORY: XR CHEST PORTABLE 2/18/2020 5:00 AM  HISTORY: Respiratory failure  COMPARISON: February 17, 2020. FINDINGS:   Extensive bilateral airspace filling infiltrates are present. This is  not significantly changed compared to February 17. The etiology is not  apparent. Differential considerations include an underlying  inflammatory or possibly neoplastic process. This may be superimposed  on pulmonary vascular congestion. .. Cardiac silhouettes unchanged. .   The osseous structures and surrounding soft tissues demonstrate no  acute abnormality. Impression:     1.  Extensive bilateral and nursing staff. I have addended and/or modified the above history of present illness, physical examination, and assessment and plan to reflect my findings and impressions. Essential elements of the care plan were discussed with APRN above. Agree with findings and assessment/plan as documented above.     Electronically signed by Benny Navas on 2/18/2020, 9:48 AM

## 2020-02-18 NOTE — DISCHARGE SUMMARY
Physician Discharge Summary     Patient ID:  Luis Viera  [id number]  5 y.o.  1960    Admit date: 2/15/2020    Discharge date and time: [id number]    Admitting Physician: Vijaya Rodriguez MD    Discharge Physician: Linda Marcial MD     Admission Diagnoses: Acute hypoxemic respiratory failure [facility]) [J96.01]    Discharge Diagnoses: Acute hypoxemic respiratory failure ([address] [J96.01], Renal Failure    Admission Condition: critical    Discharged Condition: critical    Indication for Admission: Acute hypoxemic respiratory failure ([address] [J96.01]    Hospital Course:   [id number]: (later)  Family requested DNR-CC and Hopsice evaluation  [id number]: (earlier)  Mr. Luis Viera is resting on BiPAP. He continues to have AMS. He is on diltiazem GGT still. He was seen and assessed with pulmonary medicine at the bedside and we concur with plan as to when / if to intubate. The case was d/w them again this AM. He is for HD again today with further UF. [id number]:  He was only able to tolerate 1.3L of anticipated 3L ultrafiltration. He remains on BiPAP. Respiratory status worsening, pulmonary adjusted BiPAP. If Respiratory failure will have to intubate.   [id number]:  Mr. Luis Viera is a mentally handicapped 61year old  ame[place] man and fetal alcohol syndrome victim. He had been transferred to us for worsening oxygenation necessitating BiPAP for NIPPV suppport as well as alteration of mental status in addition to the originally stated reason of needing nephrology evaluation. Upon searching for further information my colleague discovered that the hopital where the patient had been has an ICU in addition to intensivist support on site. When he was calling them for further information, he was also unable to obtain greater detail as to ABx therapy. He had added a CT scan to my initial plan as per the AMS they revealed to him later in the day when he called, this did not reveal a cause of any alteration to us.  He has as per his RN BMI 28.86 kg/m²   Vitals signs reviewed. Constitutional:       General: He is not in acute distress. Appearance: He is obese. He is ill-appearing. He is not toxic-appearing. HENT:      Head: Normocephalic and atraumatic. Nose: No congestion or rhinorrhea. Eyes:      General:         Right eye: No discharge. Left eye: No discharge. Neck:      Musculoskeletal: Neck supple. Comments: Trachea appears midline  Cardiovascular:      Rate and Rhythm: Normal rate and regular rhythm. Heart sounds: No murmur. No friction rub. No gallop. Pulmonary:      Effort: No respiratory distress. Breath sounds: No stridor. No wheezing, rhonchi or rales. Abdominal:      General: Bowel sounds are normal.      Tenderness: There is no abdominal tenderness. There is no guarding or rebound. Skin:     General: Skin is warm. Comments: nondiaphoretic   Neurological:      Comments: Lethargic, no tremors   Psychiatric:      Comments: Not able to assess at this time as per general medical condition       Disposition: in patient hospice care    Patient Instructions: Activity: bedrest  Diet: pleasure feeds only  Wound Care: none needed    Follow-up is not indicated.       Signed:  Karly Brown  2/18/2020  3:01 PM       Care time of <30 minutes      ADDENDUM:    Updated at ~22:00 that patient passed     Patient passed at 21:16 while awaiting return of forms form family so as to allow transfer to the hospice unit

## 2020-02-18 NOTE — PROGRESS NOTES
Results for Franck Epps (MRN [id number]) as of 2/18/2020 04:25   Ref.  Range 2/18/2020 04:23   Hemoglobin, Art, Extended Latest Ref Range: 14.0 - 18.0 g/dL 9.1 (L)   pH, Arterial Latest Ref Range: 7.350 - 7.450  7.370   pCO2, Arterial Latest Ref Range: 35.0 - 45.0 mmHg 43.0   pO2, Arterial Latest Ref Range: 80.0 - 100.0 mmHg 53.0 (L)   HCO3, Arterial Latest Ref Range: 22.0 - 26.0 mmol/L 24.9   Base Excess, Arterial Latest Ref Range: -2.0 - 2.0 mmol/L -0.5   O2 Sat, Arterial Latest Ref Range: >92 % 88.4 (L)   O2 Content, Arterial Latest Ref Range: Not Established mL/dL 11.3   Methemoglobin, Arterial Latest Ref Range: <1.5 % 1.7   Carboxyhgb, Arterial     Latest Ref Range: 0.0 - 5.0 % 2.4     .  18 LPM    RR AT+

## 2020-02-18 NOTE — CONSULTS
ARIPiprazole (ABILIFY) 10 MG tablet Take 10 mg by mouth daily    Historical Provider, MD   acetaminophen-codeine (TYLENOL/CODEINE #3) 300-30 MG per tablet Take 1 tablet by mouth 2 times daily. Historical Provider, MD   amiodarone (CORDARONE) 200 MG tablet Take 200 mg by mouth daily    Historical Provider, MD   cyclobenzaprine (FLEXERIL) 5 MG tablet Take 5 mg by mouth every 8 hours as needed for Muscle spasms    Historical Provider, MD   diphenoxylate-atropine (LOMOTIL) 2.5-0.025 MG per tablet Take 1 tablet by mouth every 8 hours as needed for Diarrhea. Historical Provider, MD   ferrous sulfate 325 (65 Fe) MG tablet Take 325 mg by mouth daily (with breakfast)    Historical Provider, MD   fluticasone (FLONASE) 50 MCG/ACT nasal spray 1 spray by Each Nostril route 2 times daily    Historical Provider, MD   folic acid-pyridoxine-cyanocobalamine (FOLTX) 2.5-25-1 MG TABS tablet Take 1 tablet by mouth daily    Historical Provider, MD   gabapentin (NEURONTIN) 300 MG capsule Take 300 mg by mouth 2 times daily.     Historical Provider, MD   levothyroxine (SYNTHROID) 25 MCG tablet Take 25 mcg by mouth Daily    Historical Provider, MD   loratadine (CLARITIN) 10 MG tablet Take 10 mg by mouth daily    Historical Provider, MD   metoprolol tartrate (LOPRESSOR) 25 MG tablet Take 25 mg by mouth 2 times daily    Historical Provider, MD   promethazine (PHENERGAN) 12.5 MG tablet Take 12.5 mg by mouth every 6 hours as needed for Nausea    Historical Provider, MD   Probiotic Product (RISAQUAD PO) Take by mouth daily I cap daily    Historical Provider, MD   colesevelam (WELCHOL) 625 MG tablet Take 1,875 mg by mouth 2 times daily (with meals)    Historical Provider, MD       Current Meds:   ipratropium-albuterol  1 ampule Inhalation 4x daily    heparin (porcine)  4,000 Units Intravenous Once    meropenem  500 mg Intravenous Q12H    amiodarone  200 mg Oral Daily    ARIPiprazole  10 mg Oral Daily    cholestyramine light  4 g Oral normal. There is no distension. Palpations: Abdomen is soft. There is no mass. Tenderness: There is no abdominal tenderness. There is no guarding or rebound. Musculoskeletal:         General: No deformity. Skin:     General: Skin is warm. Coloration: Skin is not pale. Findings: No erythema or rash. Neurological:      Mental Status: He is alert and oriented to person, place, and time. Motor: No abnormal muscle tone. Coordination: Coordination normal.      Deep Tendon Reflexes: Reflexes normal.           Labs:  Recent Labs     02/15/20  2145 02/16/20  0120 02/17/20  0150   WBC 4.9 5.0 5.8   HGB 9.2* 8.8* 8.5*    135 164       Recent Labs     02/15/20  2145 02/16/20  0120 02/17/20  0150 02/17/20  0841    142 142  --    K 4.7  4.7 4.8 4.5 4.7    106 104  --    CO2 20* 19* 20*  --    BUN 66* 68* 82*  --    CREATININE 5.7* 6.1* 6.6*  --    LABGLOM 10* 9* 9*  --    MG 2.6  --  2.4  --    CALCIUM 7.7* 7.9* 7.7*  --    PHOS 4.9*  --  4.7*  --        CK, CKMB, Troponin: @LABRCNT (CKTOTAL:3, CKMB:3, TROPONINI:3)@    Last 3 BNP:          IMAGING:  Ct Head Wo Contrast    Result Date: 2/16/2020  EXAMINATION: CT HEAD WO CONTRAST 2/16/2020 2:25 PM HISTORY: Mental status change Comparison: None Technique: Sequential imaging was performed from the vertex through the base of the skull without the use of IV contrast. Sagittal and coronal reformations were made from the original source data and reviewed. Automated exposure control was utilized for radiation dose reduction. Radiation dose:  mGy-cm Findings: There is diffuse cerebral and cerebellar atrophy. There is no evidence of acute intracranial hemorrhage or midline shift. The ventricles are prominent, somewhat disproportionate to the degree of cerebral volume loss. Basilar cisterns are patent. Posterior fossa appears grossly normal. Calvarium is intact.  Visualized paranasal sinuses and mastoid air cells appear than left bilateral infiltrate compatible with diffuse pneumonia. 2. Follow-up to document resolution and rule out an underlying mass will be needed. Signed by Dr Sandy Cool on 2/15/2020 9:22 PM          Assessment and Plan: This is a 61y.o. year old male with past medical history of fetal alcohol syndrome with mental retardation, diabetes mellitus, paroxysmal atrial fibrillation on Eliquis and amiodarone presenting with respiratory distress requiring BiPAP, possible pneumonia and acute on chronic renal failure, initiated on hemodialysis with episode of atrial fibrillation with rapid ventricular response, now in sinus rhythm, noted elevated INR at 6.6.    1. Would hold Eliquis as well as IV heparin at this time until INR less than 2. Can use IV Cardizem for rate control if recurrent atrial fibrillation. Continue amiodarone at 200 mg daily. Can also use beta blocker such as Lopressor orally if needed for rate control.   2. Obtain echocardiogram.        Electronically signed by Isidoro Tucker MD on 2/17/2020 at 8:03 PM

## 2020-02-21 LAB
BLOOD CULTURE, ROUTINE: NORMAL
CULTURE, BLOOD 2: NORMAL

## 2022-05-16 NOTE — PLAN OF CARE
Problem: Patient Care Overview  Goal: Plan of Care Review  Outcome: Ongoing (interventions implemented as appropriate)   04/06/19 6454   Coping/Psychosocial   Plan of Care Reviewed With patient   Plan of Care Review   Progress improving   OTHER   Outcome Summary Patient started on pureed diet and honey thick liquids, tolerating well. No c/o pain. S  on tele. VSS. O2 sats WNL on 3L NC, does de-sat to 86-87% on RA. Continue to monitor.        Problem: Skin Injury Risk (Adult)  Goal: Skin Health and Integrity  Outcome: Ongoing (interventions implemented as appropriate)      Problem: Fall Risk (Adult)  Goal: Absence of Fall  Outcome: Ongoing (interventions implemented as appropriate)      Problem: Sepsis/Septic Shock (Adult)  Goal: Signs and Symptoms of Listed Potential Problems Will be Absent, Minimized or Managed (Sepsis/Septic Shock)  Outcome: Ongoing (interventions implemented as appropriate)      Problem: Nutrition, Imbalanced: Inadequate Oral Intake (Adult)  Goal: Improved Oral Intake  Outcome: Ongoing (interventions implemented as appropriate)    Goal: Prevent Further Weight Loss  Outcome: Ongoing (interventions implemented as appropriate)      Problem: Dysphagia (Adult)  Goal: Functional/Safe Swallow  Outcome: Ongoing (interventions implemented as appropriate)    Goal: Compensatory Techniques to Improve Safety/Function with Swallowing  Outcome: Ongoing (interventions implemented as appropriate)         128